# Patient Record
Sex: FEMALE | Race: WHITE | Employment: FULL TIME | URBAN - NONMETROPOLITAN AREA
[De-identification: names, ages, dates, MRNs, and addresses within clinical notes are randomized per-mention and may not be internally consistent; named-entity substitution may affect disease eponyms.]

---

## 2017-12-20 ENCOUNTER — OFFICE VISIT (OUTPATIENT)
Dept: URGENT CARE | Age: 37
End: 2017-12-20
Payer: COMMERCIAL

## 2017-12-20 VITALS
OXYGEN SATURATION: 100 % | DIASTOLIC BLOOD PRESSURE: 83 MMHG | RESPIRATION RATE: 22 BRPM | HEART RATE: 75 BPM | HEIGHT: 68 IN | WEIGHT: 217.38 LBS | SYSTOLIC BLOOD PRESSURE: 126 MMHG | BODY MASS INDEX: 32.94 KG/M2 | TEMPERATURE: 98.2 F

## 2017-12-20 DIAGNOSIS — R52 BODY ACHES: ICD-10-CM

## 2017-12-20 DIAGNOSIS — J10.1 TYPE A INFLUENZA: Primary | ICD-10-CM

## 2017-12-20 DIAGNOSIS — J02.9 SORE THROAT: ICD-10-CM

## 2017-12-20 LAB
INFLUENZA A ANTIBODY: POSITIVE
INFLUENZA B ANTIBODY: NEGATIVE
S PYO AG THROAT QL: NORMAL

## 2017-12-20 PROCEDURE — 87880 STREP A ASSAY W/OPTIC: CPT | Performed by: PHYSICIAN ASSISTANT

## 2017-12-20 PROCEDURE — 87804 INFLUENZA ASSAY W/OPTIC: CPT | Performed by: PHYSICIAN ASSISTANT

## 2017-12-20 PROCEDURE — G8427 DOCREV CUR MEDS BY ELIG CLIN: HCPCS | Performed by: PHYSICIAN ASSISTANT

## 2017-12-20 PROCEDURE — G8417 CALC BMI ABV UP PARAM F/U: HCPCS | Performed by: PHYSICIAN ASSISTANT

## 2017-12-20 PROCEDURE — G8484 FLU IMMUNIZE NO ADMIN: HCPCS | Performed by: PHYSICIAN ASSISTANT

## 2017-12-20 PROCEDURE — 99202 OFFICE O/P NEW SF 15 MIN: CPT | Performed by: PHYSICIAN ASSISTANT

## 2017-12-20 PROCEDURE — 1036F TOBACCO NON-USER: CPT | Performed by: PHYSICIAN ASSISTANT

## 2017-12-20 RX ORDER — OSELTAMIVIR PHOSPHATE 75 MG/1
75 CAPSULE ORAL 2 TIMES DAILY
Qty: 10 CAPSULE | Refills: 0 | Status: SHIPPED | OUTPATIENT
Start: 2017-12-20 | End: 2017-12-25

## 2017-12-20 ASSESSMENT — ENCOUNTER SYMPTOMS
NAUSEA: 0
VOMITING: 0
COUGH: 1
RHINORRHEA: 0
DIARRHEA: 0
SORE THROAT: 1
ABDOMINAL PAIN: 0

## 2017-12-20 NOTE — PATIENT INSTRUCTIONS
Patient Education        Influenza (Flu): Care Instructions  Your Care Instructions    Influenza (flu) is an infection in the lungs and breathing passages. It is caused by the influenza virus. There are different strains, or types, of the flu virus from year to year. Unlike the common cold, the flu comes on suddenly and the symptoms, such as a cough, congestion, fever, chills, fatigue, aches, and pains, are more severe. These symptoms may last up to 10 days. Although the flu can make you feel very sick, it usually doesn't cause serious health problems. Home treatment is usually all you need for flu symptoms. But your doctor may prescribe antiviral medicine to prevent other health problems, such as pneumonia, from developing. Older people and those who have a long-term health condition, such as lung disease, are most at risk for having pneumonia or other health problems. Follow-up care is a key part of your treatment and safety. Be sure to make and go to all appointments, and call your doctor if you are having problems. It's also a good idea to know your test results and keep a list of the medicines you take. How can you care for yourself at home? · Get plenty of rest.  · Drink plenty of fluids, enough so that your urine is light yellow or clear like water. If you have kidney, heart, or liver disease and have to limit fluids, talk with your doctor before you increase the amount of fluids you drink. · Take an over-the-counter pain medicine if needed, such as acetaminophen (Tylenol), ibuprofen (Advil, Motrin), or naproxen (Aleve), to relieve fever, headache, and muscle aches. Read and follow all instructions on the label. No one younger than 20 should take aspirin. It has been linked to Reye syndrome, a serious illness. · Do not smoke. Smoking can make the flu worse. If you need help quitting, talk to your doctor about stop-smoking programs and medicines.  These can increase your chances of quitting for good.  · Breathe moist air from a hot shower or from a sink filled with hot water to help clear a stuffy nose. · Before you use cough and cold medicines, check the label. These medicines may not be safe for young children or for people with certain health problems. · If the skin around your nose and lips becomes sore, put some petroleum jelly on the area. · To ease coughing:  ¨ Drink fluids to soothe a scratchy throat. ¨ Suck on cough drops or plain hard candy. ¨ Take an over-the-counter cough medicine that contains dextromethorphan to help you get some sleep. Read and follow all instructions on the label. ¨ Raise your head at night with an extra pillow. This may help you rest if coughing keeps you awake. · Take any prescribed medicine exactly as directed. Call your doctor if you think you are having a problem with your medicine. To avoid spreading the flu  · Wash your hands regularly, and keep your hands away from your face. · Stay home from school, work, and other public places until you are feeling better and your fever has been gone for at least 24 hours. The fever needs to have gone away on its own without the help of medicine. · Ask people living with you to talk to their doctors about preventing the flu. They may get antiviral medicine to keep from getting the flu from you. · To prevent the flu in the future, get a flu vaccine every fall. Encourage people living with you to get the vaccine. · Cover your mouth when you cough or sneeze. When should you call for help? Call 911 anytime you think you may need emergency care. For example, call if:  ? · You have severe trouble breathing. ?Call your doctor now or seek immediate medical care if:  ? · You have new or worse trouble breathing. ? · You seem to be getting much sicker. ? · You feel very sleepy or confused. ? · You have a new or higher fever. ? · You get a new rash. ? Watch closely for changes in your health, and be sure to contact your doctor if:  ? · You begin to get better and then get worse. ? · You are not getting better after 1 week. Where can you learn more? Go to https://Anaplanpepiceweb.OSIsoft. org and sign in to your Duable Chinese account. Enter X359 in the [facility] box to learn more about \"Influenza (Flu): Care Instructions. \"     If you do not have an account, please click on the \"Sign Up Now\" link. Current as of: May 12, 2017  Content Version: 11.4  © 8527-0076 Healthwise, Incorporated. Care instructions adapted under license by [facility] ([facility]). If you have questions about a medical condition or this instruction, always ask your healthcare professional. [address] any warranty or liability for your use of this information.

## 2017-12-20 NOTE — PROGRESS NOTES
Subjective:      Patient ID: Meryle Slimmer is a 40 y.o. female. HPI    Eunice present today with cough, sore throat, nasal congestion, and chest tightness. Symptoms developed yesterday. No fever noted. No runny nose. Cough is productive. Slight left ear pain. Slight headache. No abdominal pain or NVD. Taken Theraflu and Dayquil. Review of Systems   Constitutional: Negative for chills and fever. HENT: Positive for congestion, ear pain and sore throat. Negative for rhinorrhea. Respiratory: Positive for cough. Gastrointestinal: Negative for abdominal pain, diarrhea, nausea and vomiting. Neurological: Positive for headaches. All other systems reviewed and are negative. Objective:   Physical Exam   Constitutional: She is oriented to person, place, and time. She appears well-developed and well-nourished. No distress. HENT:   Head: Normocephalic and atraumatic. Right Ear: External ear normal.   Left Ear: External ear normal.   Nose: Nose normal.   Mouth/Throat: Oropharynx is clear and moist. No oropharyngeal exudate. Eyes: Conjunctivae are normal. Right eye exhibits no discharge. Left eye exhibits no discharge. Neck: Normal range of motion. Neck supple. Cardiovascular: Normal rate, regular rhythm and normal heart sounds. No murmur heard. Pulmonary/Chest: Effort normal and breath sounds normal. No respiratory distress. She has no wheezes. She has no rales. Abdominal: Soft. Bowel sounds are normal. She exhibits no distension and no mass. There is no tenderness. There is no rebound and no guarding. Lymphadenopathy:     She has no cervical adenopathy. Neurological: She is alert and oriented to person, place, and time. Skin: Skin is warm and dry. No rash noted. She is not diaphoretic. No erythema. No pallor. Psychiatric: She has a normal mood and affect. Her behavior is normal. Judgment and thought content normal.   Nursing note and vitals reviewed.     Results for orders

## 2018-03-27 ENCOUNTER — OFFICE VISIT (OUTPATIENT)
Dept: URGENT CARE | Age: 38
End: 2018-03-27
Payer: COMMERCIAL

## 2018-03-27 VITALS
WEIGHT: 214.4 LBS | TEMPERATURE: 98.9 F | SYSTOLIC BLOOD PRESSURE: 138 MMHG | DIASTOLIC BLOOD PRESSURE: 87 MMHG | OXYGEN SATURATION: 100 % | BODY MASS INDEX: 32.49 KG/M2 | HEIGHT: 68 IN | RESPIRATION RATE: 20 BRPM | HEART RATE: 87 BPM

## 2018-03-27 DIAGNOSIS — R52 BODY ACHES: ICD-10-CM

## 2018-03-27 DIAGNOSIS — K52.9 GASTROENTERITIS: Primary | ICD-10-CM

## 2018-03-27 DIAGNOSIS — R11.2 NAUSEA AND VOMITING, INTRACTABILITY OF VOMITING NOT SPECIFIED, UNSPECIFIED VOMITING TYPE: ICD-10-CM

## 2018-03-27 LAB
INFLUENZA A ANTIBODY: NEGATIVE
INFLUENZA B ANTIBODY: NEGATIVE
S PYO AG THROAT QL: NORMAL

## 2018-03-27 PROCEDURE — 87880 STREP A ASSAY W/OPTIC: CPT | Performed by: NURSE PRACTITIONER

## 2018-03-27 PROCEDURE — G8427 DOCREV CUR MEDS BY ELIG CLIN: HCPCS | Performed by: NURSE PRACTITIONER

## 2018-03-27 PROCEDURE — 1036F TOBACCO NON-USER: CPT | Performed by: NURSE PRACTITIONER

## 2018-03-27 PROCEDURE — 87804 INFLUENZA ASSAY W/OPTIC: CPT | Performed by: NURSE PRACTITIONER

## 2018-03-27 PROCEDURE — 99213 OFFICE O/P EST LOW 20 MIN: CPT | Performed by: NURSE PRACTITIONER

## 2018-03-27 PROCEDURE — G8417 CALC BMI ABV UP PARAM F/U: HCPCS | Performed by: NURSE PRACTITIONER

## 2018-03-27 PROCEDURE — G8484 FLU IMMUNIZE NO ADMIN: HCPCS | Performed by: NURSE PRACTITIONER

## 2018-03-27 RX ORDER — ONDANSETRON 4 MG/1
8 TABLET, FILM COATED ORAL EVERY 8 HOURS PRN
Qty: 9 TABLET | Refills: 0 | Status: SHIPPED | OUTPATIENT
Start: 2018-03-27 | End: 2021-08-26

## 2018-03-27 RX ORDER — ONDANSETRON 8 MG/1
8 TABLET, ORALLY DISINTEGRATING ORAL ONCE
Status: COMPLETED | OUTPATIENT
Start: 2018-03-27 | End: 2018-03-27

## 2018-03-27 RX ADMIN — ONDANSETRON 8 MG: 8 TABLET, ORALLY DISINTEGRATING ORAL at 12:00

## 2018-03-27 ASSESSMENT — ENCOUNTER SYMPTOMS
EYES NEGATIVE: 1
WHEEZING: 0
ABDOMINAL PAIN: 0
VOMITING: 1
COUGH: 0
NAUSEA: 1
EYE REDNESS: 0
SORE THROAT: 0
ABDOMINAL DISTENTION: 0
SHORTNESS OF BREATH: 0
RESPIRATORY NEGATIVE: 1
SINUS PRESSURE: 0
TROUBLE SWALLOWING: 0
CONSTIPATION: 0
DIARRHEA: 1
ALLERGIC/IMMUNOLOGIC NEGATIVE: 1

## 2018-03-27 NOTE — PROGRESS NOTES
ZOFRAN 8 MG ODT GIVEN PO.  PT TOLERATED WELL.--HC, LPN
Influenza A/B   3. Nausea and vomiting, intractability of vomiting not specified, unspecified vomiting type  ondansetron (ZOFRAN-ODT) disintegrating tablet 8 mg    ondansetron (ZOFRAN) 4 MG tablet       Plan:    Discussed viral condition which is what symptoms are eluding to. Discussed self-limiting aspects of virus and importance of hydration. Pt voiced understanding   Orders Placed This Encounter   Procedures    POCT rapid strep A    POCT Influenza A/B       No Follow-up on file. Orders Placed This Encounter   Procedures    POCT rapid strep A    POCT Influenza A/B     Orders Placed This Encounter   Medications    ondansetron (ZOFRAN-ODT) disintegrating tablet 8 mg    ondansetron (ZOFRAN) 4 MG tablet     Sig: Take 2 tablets by mouth every 8 hours as needed for Nausea or Vomiting     Dispense:  9 tablet     Refill:  0       Patient given educational materials - see patient instructions. Discussed use, benefit, and side effects of prescribed medications. All patient questions answered. Pt voiced understanding. Reviewed health maintenance. Instructed to continue current medications, diet and exercise. Patient agreed with treatment plan. Follow up as directed. Patient Instructions   1. Take zofran as prescribed for n/v  2. Stay hydrated with fluids of choice  3. Gradually resume solids as tolerated  4.  Return to clinic if symptoms worsen or fail to improve        Electronically signed by Andria Tsai CNP on 3/27/2018 at 12:17 PM

## 2018-03-27 NOTE — PATIENT INSTRUCTIONS
1. Take zofran as prescribed for n/v  2. Stay hydrated with fluids of choice  3. Gradually resume solids as tolerated  4.  Return to clinic if symptoms worsen or fail to improve

## 2021-03-17 ENCOUNTER — OFFICE VISIT (OUTPATIENT)
Dept: FAMILY MEDICINE CLINIC | Facility: CLINIC | Age: 41
End: 2021-03-17

## 2021-03-17 VITALS
BODY MASS INDEX: 29.62 KG/M2 | TEMPERATURE: 98 F | RESPIRATION RATE: 20 BRPM | HEIGHT: 69 IN | WEIGHT: 200 LBS | DIASTOLIC BLOOD PRESSURE: 77 MMHG | SYSTOLIC BLOOD PRESSURE: 123 MMHG | HEART RATE: 66 BPM

## 2021-03-17 DIAGNOSIS — Z11.1 TUBERCULOSIS SCREENING: Primary | ICD-10-CM

## 2021-03-17 PROCEDURE — 86580 TB INTRADERMAL TEST: CPT | Performed by: NURSE PRACTITIONER

## 2021-03-17 NOTE — PROGRESS NOTES
"Chief Complaint  TB Test    Subjective    History of Present Illness      Patient presents to [facility] for   Pt states that she is her for TB skin test for employment. No further concerns today.       Review of Systems   All other systems reviewed and are negative.      I have reviewed and agree with the HPI and ROS information as above.  Candace Sarmiento Too, APRN     Objective   Vital Signs:   /77   Pulse 66   Temp 98 °F (36.7 °C)   Resp 20   Ht 175.3 cm (69\")   Wt 90.7 kg (200 lb)   BMI 29.53 kg/m²       Physical Exam  Constitutional:       Appearance: She is well-developed and overweight.   HENT:      Head: Normocephalic and atraumatic.      Right Ear: Tympanic membrane, ear canal and external ear normal.      Left Ear: Tympanic membrane, ear canal and external ear normal.      Nose: Nose normal. No septal deviation, nasal tenderness or congestion.      Mouth/Throat:      Lips: Pink. No lesions.      Mouth: Mucous membranes are moist. No oral lesions.      Dentition: Normal dentition.      Pharynx: Oropharynx is clear. No pharyngeal swelling, oropharyngeal exudate or posterior oropharyngeal erythema.   Eyes:      General: Lids are normal. Vision grossly intact. No scleral icterus.        Right eye: No discharge.         Left eye: No discharge.      Extraocular Movements: Extraocular movements intact.      Conjunctiva/sclera: Conjunctivae normal.      Right eye: Right conjunctiva is not injected.      Left eye: Left conjunctiva is not injected.      Pupils: Pupils are equal, round, and reactive to light.   Neck:      Thyroid: No thyroid mass.      Trachea: Trachea normal.   Cardiovascular:      Rate and Rhythm: Normal rate and regular rhythm.      Heart sounds: Normal heart sounds. No murmur heard.   No gallop.    Pulmonary:      Effort: Pulmonary effort is normal.      Breath sounds: Normal breath sounds and air entry. No wheezing, rhonchi or rales.   Abdominal:      " General: There is no distension.      Palpations: Abdomen is soft. There is no mass.      Tenderness: There is no abdominal tenderness. There is no right CVA tenderness, left CVA tenderness, guarding or rebound.   Musculoskeletal:         General: No tenderness or deformity. Normal range of motion.      Cervical back: Full passive range of motion without pain, normal range of motion and neck supple.      Thoracic back: Normal.      Right lower leg: No edema.      Left lower leg: No edema.   Skin:     General: Skin is warm and dry.      Coloration: Skin is not jaundiced.      Findings: No rash.   Neurological:      Mental Status: She is alert and oriented to person, place, and time.      Cranial Nerves: Cranial nerves are intact.      Sensory: Sensation is intact.      Motor: Motor function is intact.      Coordination: Coordination is intact.      Gait: Gait is intact.      Deep Tendon Reflexes: Reflexes are normal and symmetric.   Psychiatric:         Mood and Affect: Mood and affect normal.         Judgment: Judgment normal.          Result Review  Data Reviewed:                   Assessment and Plan    Patient's Body mass index is 29.53 kg/m².     Problem List Items Addressed This Visit     None      Visit Diagnoses     Tuberculosis screening    -  Primary    Relevant Orders    TB Skin Test (Completed)      Patient here today requesting a TB skin test for preemployment qualifications.  No past history not high risk.  Follow-up for recheck in 48 to 72 hours.        Follow Up   Return for 48-72 hrs .  Patient was given instructions and counseling regarding her condition or for health maintenance advice. Please see specific information pulled into the AVS if appropriate.

## 2021-03-19 ENCOUNTER — CLINICAL SUPPORT (OUTPATIENT)
Dept: FAMILY MEDICINE CLINIC | Facility: CLINIC | Age: 41
End: 2021-03-19

## 2021-03-19 LAB
INDURATION: 0 MM (ref 0–10)
TB SKIN TEST: NEGATIVE

## 2021-08-17 ENCOUNTER — TELEPHONE (OUTPATIENT)
Dept: OBGYN CLINIC | Age: 41
End: 2021-08-17

## 2021-08-17 ENCOUNTER — TELEMEDICINE (OUTPATIENT)
Dept: OBGYN CLINIC | Age: 41
End: 2021-08-17
Payer: MEDICAID

## 2021-08-17 DIAGNOSIS — N91.2 AMENORRHEA: ICD-10-CM

## 2021-08-17 DIAGNOSIS — Z32.01 POSITIVE URINE PREGNANCY TEST: ICD-10-CM

## 2021-08-17 DIAGNOSIS — Z76.89 ENCOUNTER TO ESTABLISH CARE: Primary | ICD-10-CM

## 2021-08-17 PROCEDURE — 99203 OFFICE O/P NEW LOW 30 MIN: CPT | Performed by: NURSE PRACTITIONER

## 2021-08-17 RX ORDER — PNV NO.95/FERROUS FUM/FOLIC AC 28MG-0.8MG
TABLET ORAL
COMMUNITY
End: 2021-08-26 | Stop reason: ALTCHOICE

## 2021-08-17 RX ORDER — PROMETHAZINE HYDROCHLORIDE 25 MG/1
25 TABLET ORAL 4 TIMES DAILY PRN
Qty: 20 TABLET | Refills: 2 | Status: SHIPPED | OUTPATIENT
Start: 2021-08-17 | End: 2021-08-17

## 2021-08-17 RX ORDER — PROMETHAZINE HYDROCHLORIDE 25 MG/1
25 TABLET ORAL 4 TIMES DAILY PRN
Qty: 20 TABLET | Refills: 2 | Status: SHIPPED | OUTPATIENT
Start: 2021-08-17 | End: 2021-08-24

## 2021-08-17 ASSESSMENT — ENCOUNTER SYMPTOMS
NAUSEA: 1
EYES NEGATIVE: 1
DIARRHEA: 0
ALLERGIC/IMMUNOLOGIC NEGATIVE: 1
RESPIRATORY NEGATIVE: 1
CONSTIPATION: 0

## 2021-08-17 NOTE — PROGRESS NOTES
2021    TELEHEALTH EVALUATION -- Audio/Visual (During MBTXZ-14 public health emergency)    HPI:    Vandana Berrios (:  1980) has requested an audio/video evaluation for the following concern(s):    New pt presents with +UPT at home. Having some NV, no bleeding. LMP was around 7/4, approx 6-7 weeks. Has not started PNV yet. History of  8 years ago. Review of Systems   Constitutional: Negative. HENT: Negative. Eyes: Negative. Respiratory: Negative. Cardiovascular: Negative. Gastrointestinal: Positive for nausea. Negative for constipation and diarrhea. Endocrine: Negative. Genitourinary: Positive for menstrual problem (missed x1). Negative for frequency and urgency. Musculoskeletal: Negative. Skin: Negative. Allergic/Immunologic: Negative. Neurological: Negative. Hematological: Negative. Psychiatric/Behavioral: Negative. All other systems reviewed and are negative. Prior to Visit Medications    Medication Sig Taking? Authorizing Provider   Levonorgestrel (MIRENA, 52 MG, IU) by Intrauterine route  Historical Provider, MD   ondansetron (ZOFRAN) 4 MG tablet Take 2 tablets by mouth every 8 hours as needed for Nausea or Vomiting  Meg Price, APRN - CNP       Social History     Tobacco Use    Smoking status: Never Smoker    Smokeless tobacco: Never Used   Substance Use Topics    Alcohol use: No    Drug use: Not on file        Allergies   Allergen Reactions    Codeine     Lactose Intolerance (Gi)    , History reviewed. No pertinent past medical history. , History reviewed. No pertinent surgical history. ,   Social History     Tobacco Use    Smoking status: Never Smoker    Smokeless tobacco: Never Used   Substance Use Topics    Alcohol use: No    Drug use: Not on file       PHYSICAL EXAMINATION:  [ INSTRUCTIONS:  \"[x]\" Indicates a positive item  \"[]\" Indicates a negative item  -- DELETE ALL ITEMS NOT EXAMINED]  Vital Signs: (As obtained by patient/caregiver or practitioner observation)    Blood pressure-  Heart rate-    Respiratory rate-    Temperature-  Pulse oximetry-     Constitutional: [x] Appears well-developed and well-nourished [x] No apparent distress      [] Abnormal-   Mental status  [x] Alert and awake  [x] Oriented to person/place/time [x]Able to follow commands      Eyes:  EOM    [x]  Normal  [] Abnormal-  Sclera  [x]  Normal  [] Abnormal -         Discharge [x]  None visible  [] Abnormal -    HENT:   [x] Normocephalic, atraumatic. [] Abnormal   [x] Mouth/Throat: Mucous membranes are moist.     External Ears [x] Normal  [] Abnormal-     Neck: [x] No visualized mass     Pulmonary/Chest: [x] Respiratory effort normal.  [x] No visualized signs of difficulty breathing or respiratory distress        [] Abnormal-      Musculoskeletal:   [x] Normal gait with no signs of ataxia         [x] Normal range of motion of neck        [] Abnormal-       Neurological:        [x] No Facial Asymmetry (Cranial nerve 7 motor function) (limited exam to video visit)          [x] No gaze palsy        [] Abnormal-         Skin:        [x] No significant exanthematous lesions or discoloration noted on facial skin         [] Abnormal-            Psychiatric:       [x] Normal Affect [x] No Hallucinations        [] Abnormal-     Other pertinent observable physical exam findings-     ASSESSMENT/PLAN:  1. Encounter to establish care    2. Amenorrhea    3. Positive urine pregnancy test    Plan hcg and start PNV. Called in Phenergan if needed. Mailing booklet. Return in about 2 weeks (around 8/31/2021) for Prenatal, u/s prior to appt. Sukhdeep Santo, was evaluated through a synchronous (real-time) audio-video encounter. The patient (or guardian if applicable) is aware that this is a billable service. Verbal consent to proceed has been obtained within the past 12 months.  The visit was conducted pursuant to the emergency declaration under the [address] and the National Emergencies Act, [facility] waiver authority and the [place] [employer] Act. Patient identification was verified, and a caregiver was present when appropriate. The patient was located in a state where the provider was credentialed to provide care. Total time spent on this encounter: Not billed by time    --EDELMIRA Andrade CNP on 8/17/2021 at 8:50 AM    An electronic signature was used to authenticate this note.

## 2021-08-17 NOTE — PATIENT INSTRUCTIONS
Patient Education        Learning About Pregnancy  Your Care Instructions     Your health in the early weeks of your pregnancy is particularly important for your baby's health. Take good care of yourself. Anything you do that harms your body can also harm your baby. Make sure to go to all of your doctor appointments. Regular checkups will help keep you and your baby healthy. How can you care for yourself at home? Diet    · Eat a balanced diet. Make sure your diet includes plenty of beans, peas, and leafy green vegetables.     · Do not skip meals or go for many hours without eating. If you are nauseated, try to eat a small, healthy snack every 2 to 3 hours.     · Do not eat fish that has a high level of mercury, such as shark, swordfish, or mackerel. Do not eat more than one can of tuna each week.     · Drink plenty of fluids. If you have kidney, heart, or liver disease and have to limit fluids, talk with your doctor before you increase the amount of fluids you drink.     · Cut down on caffeine, such as coffee, tea, and cola.     · Do not drink alcohol, such as beer, wine, or hard liquor.     · Take a multivitamin that contains at least 400 micrograms (mcg) of folic acid to help prevent birth defects. Fortified cereal and whole wheat bread are good additional sources of folic acid.     · Increase the calcium in your diet. Try to drink a quart of skim milk each day. You may also take calcium supplements and choose foods such as cheese and yogurt. Lifestyle    · Make sure you go to your follow-up appointments.     · Get plenty of rest. You may be unusually tired while you are pregnant.     · Get at least 30 minutes of exercise on most days of the week. Walking is a good choice. If you have not exercised in the past, start out slowly. Take several short walks each day.     · Do not smoke. If you need help quitting, talk to your doctor about stop-smoking programs.  These can increase your chances of quitting for good.     · Do not touch cat feces or litter boxes. Also, wash your hands after you handle raw meat, and fully cook all meat before you eat it. Wear gloves when you work in the yard or garden, and wash your hands well when you are done. Cat feces, raw or undercooked meat, and contaminated dirt can cause an infection that may harm your baby or lead to a miscarriage.     · Do not use saunas or hot tubs. Raising your body temperature may harm your baby.     · Avoid chemical fumes, paint fumes, or poisons.     · Do not use illegal drugs, marijuana, or alcohol. Medicines    · Review all of your medicines with your doctor. Some of your routine medicines may need to be changed to protect your baby.     · Use acetaminophen (Tylenol) to relieve minor problems, such as a mild headache or backache or a mild fever with cold symptoms. Do not use nonsteroidal anti-inflammatory drugs (NSAIDs), such as ibuprofen (Advil, Motrin) or naproxen (Aleve), unless your doctor says it is okay.     · Do not take two or more pain medicines at the same time unless the doctor told you to. Many pain medicines have acetaminophen, which is Tylenol. Too much acetaminophen (Tylenol) can be harmful.     · Take your medicines exactly as prescribed. Call your doctor if you think you are having a problem with your medicine. To manage morning sickness    · If you feel sick when you first wake up, try eating a small snack (such as crackers) before you get out of bed. Allow some time to digest the snack, and then get out of bed slowly.     · Do not skip meals or go for long periods without eating. An empty stomach can make nausea worse.     · Eat small, frequent meals instead of three large meals each day.     · Drink plenty of fluids.     · Eat foods that are high in protein but low in fat.     · If you are taking iron supplements, ask your doctor if they are necessary.  Iron can make nausea worse.     · Avoid any smells, such as coffee, that make you feel sick.     · Get lots of rest. Morning sickness may be worse when you are tired. Follow-up care is a key part of your treatment and safety. Be sure to make and go to all appointments, and call your doctor if you are having problems. It's also a good idea to know your test results and keep a list of the medicines you take. Where can you learn more? Go to https://YuMepeMajor Aide.AmeriPath. org and sign in to your EveryMove account. Enter F994 in the [place] box to learn more about \"Learning About Pregnancy. \"     If you do not have an account, please click on the \"Sign Up Now\" link. Current as of: October 8, 2020               Content Version: 12.9  © 2006-2021 Healthwise, Incorporated. Care instructions adapted under license by [facility] ([facility]). If you have questions about a medical condition or this instruction, always ask your healthcare professional. [place] [address] any warranty or liability for your use of this information.

## 2021-08-17 NOTE — TELEPHONE ENCOUNTER
Returned call to pt re HCG results. She wanted to know how far along she was, I told her that Radha Mckeon has to review her results and she will be contacted about an ultrasound that will give her the CHRIS. Pt v/u.

## 2021-08-23 DIAGNOSIS — Z36.89 CONFIRM FETAL CARDIAC ACTIVITY USING ULTRASOUND: Primary | ICD-10-CM

## 2021-08-24 ENCOUNTER — NURSE TRIAGE (OUTPATIENT)
Dept: OTHER | Facility: CLINIC | Age: 41
End: 2021-08-24

## 2021-08-24 ENCOUNTER — TELEPHONE (OUTPATIENT)
Dept: OBGYN CLINIC | Age: 41
End: 2021-08-24

## 2021-08-24 NOTE — TELEPHONE ENCOUNTER
Received VM from Deanna Simon at [facility] with [employer] Complaint. Brief description of triage: Pt calls to report symptoms of widespread rash. Attempted to return call, no answer. Left voicemail. No contact, call not triaged. Attention Provider: Thank you for allowing me to participate in the care of your patient. The patient was connected to triage in response to information provided to the E[facility]. Please do not respond through this encounter as the response is not directed to a shared pool.         Reason for Disposition   Message left on identified voicemail    Protocols used: NO CONTACT OR DUPLICATE CONTACT CALL-ADULT-[place]

## 2021-08-24 NOTE — TELEPHONE ENCOUNTER
Pt called office and states she has a rash all over her body, and that it has been bothering her for a few days, but can't remember when it started. She received a covid vaccine a few weeks ago. She is unsure if it is from the vaccine or the prenatal vitamins she started. I told her to stop her prenatal vitamins and take some Benedryl  Pt is worried because she has her viability ultrasound tomorrow and wants to cancel it. I told her to wait until tomorrow and see if the rash is improved before she cancels it. I told her she could keep her appt on Thursday so we can take a look at the rash. Pt v/u.

## 2021-08-26 ENCOUNTER — OFFICE VISIT (OUTPATIENT)
Dept: OBGYN CLINIC | Age: 41
End: 2021-08-26
Payer: MEDICAID

## 2021-08-26 VITALS
WEIGHT: 182 LBS | DIASTOLIC BLOOD PRESSURE: 74 MMHG | HEIGHT: 69 IN | HEART RATE: 52 BPM | TEMPERATURE: 98.3 F | BODY MASS INDEX: 26.96 KG/M2 | SYSTOLIC BLOOD PRESSURE: 114 MMHG

## 2021-08-26 DIAGNOSIS — R21 RASH: Primary | ICD-10-CM

## 2021-08-26 DIAGNOSIS — Z32.01 PREGNANCY CONFIRMED BY POSITIVE BLOOD TEST: ICD-10-CM

## 2021-08-26 PROCEDURE — 99213 OFFICE O/P EST LOW 20 MIN: CPT | Performed by: NURSE PRACTITIONER

## 2021-08-26 RX ORDER — CETIRIZINE HYDROCHLORIDE 10 MG/1
10 TABLET ORAL DAILY
Qty: 30 TABLET | Refills: 11 | Status: SHIPPED | OUTPATIENT
Start: 2021-08-26 | End: 2021-09-25

## 2021-08-26 ASSESSMENT — ENCOUNTER SYMPTOMS
GASTROINTESTINAL NEGATIVE: 1
ALLERGIC/IMMUNOLOGIC NEGATIVE: 1
DIARRHEA: 0
RESPIRATORY NEGATIVE: 1
EYES NEGATIVE: 1
CONSTIPATION: 0

## 2021-08-26 NOTE — PATIENT INSTRUCTIONS
Patient Education        Rash: Care Instructions  Your Care Instructions  A rash is any irritation or inflammation of the skin. Rashes have many possible causes, including allergy, infection, illness, heat, and emotional stress. Follow-up care is a key part of your treatment and safety. Be sure to make and go to all appointments, and call your doctor if you are having problems. It's also a good idea to know your test results and keep a list of the medicines you take. How can you care for yourself at home? · Wash the area with water only. Soap can make dryness and itching worse. Pat dry. · Put cold, wet cloths on the rash to reduce itching. · Keep cool, and stay out of the sun. · Leave the rash open to the air as much of the time as possible. · Sometimes petroleum jelly (Vaseline) can help relieve the discomfort caused by a rash. A moisturizing lotion, such as Cetaphil, also may help. Calamine lotion may help for rashes caused by contact with something (such as a plant or soap) that irritated the skin. Use it 3 or 4 times a day. · If your doctor prescribed a cream, use it as directed. If your doctor prescribed medicine, take it exactly as directed. · If your rash itches so badly that it interferes with your normal activities, take an over-the-counter antihistamine, such as diphenhydramine (Benadryl) or loratadine (Claritin). Read and follow all instructions on the label. When should you call for help? Call your doctor now or seek immediate medical care if:    · You have signs of infection, such as:  ? Increased pain, swelling, warmth, or redness. ? Red streaks leading from the area. ? Pus draining from the area. ? A fever.     · You have joint pain along with the rash. Watch closely for changes in your health, and be sure to contact your doctor if:    · Your rash is changing or getting worse.  For example, call if you have pain along with the rash, the rash is spreading, or you have new blisters.     · You do not get better after 1 week. Where can you learn more? Go to [email or web address] and sign in to your [employer] account. Enter V104 in the EventRegist box to learn more about \"Rash: Care Instructions. \"     If you do not have an account, please click on the \"Sign Up Now\" link. Current as of: March 3, 2021               Content Version: 12.9  © 2006-2021 Healthwise, [employer]. Care instructions adapted under license by [facility] [address] If you have questions about a medical condition or this instruction, always ask your healthcare professional. [address] any warranty or liability for your use of this information.

## 2021-08-26 NOTE — PROGRESS NOTES
Sukdheep Santo is a 36 y.o. female who presents today for her medical conditions/ complaints as noted below. Sukhdeep Santo is c/o of Rash (Patient states she has a rash on her sides and buttocks. It appeared about 1wk ago. Patient states she was informed to take benedryl but she vomited it back up.)        HPI  Pt presents c/o rash on her sides and down her bottom. Started about a week ago. Was itching but that has improved. Questions if it was her PNV she started or the covid vaccine. Took both around the same time. Has had +hcg but cancelled her viability u/s. Patient's last menstrual period was 2021 (within weeks).     Past Medical History:   Diagnosis Date    Anemia      History reviewed. No pertinent surgical history. History reviewed. No pertinent family history. Social History     Tobacco Use    Smoking status: Never Smoker    Smokeless tobacco: Never Used   Substance Use Topics    Alcohol use: No       Current Outpatient Medications   Medication Sig Dispense Refill    cetirizine (ZYRTEC) 10 MG tablet Take 1 tablet by mouth daily 30 tablet 11     No current facility-administered medications for this visit. Allergies   Allergen Reactions    Codeine     Lactose Intolerance (Gi)      Vitals:    21 0932   BP: 114/74   Pulse: 52   Temp: 98.3 °F (36.8 °C)     Body mass index is 26.88 kg/m². Review of Systems   Constitutional: Negative. HENT: Negative. Eyes: Negative. Respiratory: Negative. Cardiovascular: Negative. Gastrointestinal: Negative. Negative for constipation and diarrhea. Endocrine: Negative. Genitourinary: Positive for menstrual problem (missed x1). Negative for frequency and urgency. Musculoskeletal: Negative. Skin: Positive for rash. Allergic/Immunologic: Negative. Neurological: Negative. Hematological: Negative. Psychiatric/Behavioral: Negative. All other systems reviewed and are negative.         Physical Exam  Vitals and nursing note reviewed. Constitutional:       Appearance: She is well-developed. HENT:      Head: Normocephalic. Right Ear: External ear normal.      Left Ear: External ear normal.      Nose: Nose normal.   Musculoskeletal:         General: Normal range of motion. Cervical back: Normal range of motion. Skin:     General: Skin is warm and dry. Comments: Hives noted around her belly and up her sides   Neurological:      Mental Status: She is alert and oriented to person, place, and time. Psychiatric:         Attention and Perception: Attention normal.         Mood and Affect: Mood normal.         Speech: Speech normal.         Behavior: Behavior normal.         Thought Content: Thought content normal.         Cognition and Memory: Cognition normal.         Judgment: Judgment normal.          Diagnosis Orders   1. Rash     2. Pregnancy confirmed by positive blood test         MEDICATIONS:  Orders Placed This Encounter   Medications    cetirizine (ZYRTEC) 10 MG tablet     Sig: Take 1 tablet by mouth daily     Dispense:  30 tablet     Refill:  11       ORDERS:  No orders of the defined types were placed in this encounter. PLAN:  Gave samples of different PNV  Called in Zyrtec, discussed OTC creams safe in pregnancy    There are no Patient Instructions on file for this visit.

## 2021-09-09 ENCOUNTER — HOSPITAL ENCOUNTER (OUTPATIENT)
Dept: ULTRASOUND IMAGING | Age: 41
Discharge: HOME | End: 2021-09-09
Payer: MEDICARE

## 2021-09-09 ENCOUNTER — INITIAL PRENATAL (OUTPATIENT)
Dept: OBGYN CLINIC | Age: 41
End: 2021-09-09
Payer: MEDICARE

## 2021-09-09 VITALS
DIASTOLIC BLOOD PRESSURE: 84 MMHG | SYSTOLIC BLOOD PRESSURE: 128 MMHG | HEART RATE: 61 BPM | WEIGHT: 177 LBS | BODY MASS INDEX: 26.14 KG/M2

## 2021-09-09 DIAGNOSIS — D25.9 UTERINE FIBROIDS AFFECTING PREGNANCY IN FIRST TRIMESTER: ICD-10-CM

## 2021-09-09 DIAGNOSIS — O34.11 UTERINE FIBROIDS AFFECTING PREGNANCY IN FIRST TRIMESTER: ICD-10-CM

## 2021-09-09 DIAGNOSIS — Z36.89 CONFIRM FETAL CARDIAC ACTIVITY USING ULTRASOUND: ICD-10-CM

## 2021-09-09 DIAGNOSIS — O09.521 AMA (ADVANCED MATERNAL AGE) MULTIGRAVIDA 35+, FIRST TRIMESTER: ICD-10-CM

## 2021-09-09 DIAGNOSIS — Z34.81 ENCOUNTER FOR SUPERVISION OF OTHER NORMAL PREGNANCY IN FIRST TRIMESTER: ICD-10-CM

## 2021-09-09 DIAGNOSIS — O09.91 HIGH-RISK PREGNANCY IN FIRST TRIMESTER: Primary | ICD-10-CM

## 2021-09-09 DIAGNOSIS — Z3A.10 10 WEEKS GESTATION OF PREGNANCY: ICD-10-CM

## 2021-09-09 PROCEDURE — G8427 DOCREV CUR MEDS BY ELIG CLIN: HCPCS | Performed by: NURSE PRACTITIONER

## 2021-09-09 PROCEDURE — 99213 OFFICE O/P EST LOW 20 MIN: CPT | Performed by: NURSE PRACTITIONER

## 2021-09-09 PROCEDURE — 1036F TOBACCO NON-USER: CPT | Performed by: NURSE PRACTITIONER

## 2021-09-09 PROCEDURE — 76801 OB US < 14 WKS SINGLE FETUS: CPT

## 2021-09-09 PROCEDURE — G8419 CALC BMI OUT NRM PARAM NOF/U: HCPCS | Performed by: NURSE PRACTITIONER

## 2021-09-09 NOTE — PROGRESS NOTES
Pt presents today for routine prenatal visit. Pt denies vaginal bleeding or leaking of fluid. She has had a little cramping. She does want to do genetic testing. She wants to do her pap at the next visit.

## 2021-09-09 NOTE — PROGRESS NOTES
Cleavon Ormond is here for a new obstetrical visit. Today she is 10w0d weeks EGA. She is doing well and has no complaints. Got her second covid vaccine and developed a rash. Has been taking Benadryl. Had viability u/s today. Has several fibroids and states she has had these and have caused heavy periods. Taking PNV. She  does not have vaginal bleeding, leaking of fluid, contractions. She does not have blurred vision, SOB, or increased swelling in legs or face. Pt does not feel fetal movement regularly. Plan of care was discussed with patient. Patient was encouraged to adhere to a well-balanced diet, including increasing water intake and limiting excessive caffeine and salt. The benefits of exercise were discussed; however she was advised against heavy lifting, sit-ups and abdominal crunches. A list of safe OTC medications was provided and discussed. The patient was cautioned against the use of tanning beds, hot tubs, saunas, and x-rays. Avoidance of tobacco, alcohol and illicit drugs was also discussed due to harmful effects on the fetus and increased risks associated with pregnancy. Certain labs and ultrasounds are required at certain times during pregnancy but others are optional, including the serum integrated screen/[place]/AFP/ [facility], and other genetic testing. The patient was encouraged to attend childbirth classes and general hospital information was provided based on patients hospital of choice. Objective: Mother's Prenatal Vitals  BP: 128/84  Weight: 177 lb (80.3 kg)  Pulse: 61  Patient Position: Sitting  Prenatal Fetal Information  Fetal Heart Rate: 161   Pt is A&Ox3, in no acute distress. Normocephalic, atraumatic. PERRL. Resp even and non-labored. Skin pink, warm & dry. Gravid abdomen. GALVAN's well. Gait steady. Assessment:  IUP at 10w0d wks      Diagnosis Orders   1. High-risk pregnancy in first trimester  External Referral To Maternal Fetal Medicine   2.  Encounter for supervision of other normal pregnancy in first trimester     3. 10 weeks gestation of pregnancy  HIV Obstetric Panel    Culture, Urine    Varicella Zoster Antibody, IgG    Chlamydia/N. Gonorrhoeae/T. Vaginalis   4. Uterine fibroids affecting pregnancy in first trimester  External Referral To Maternal Fetal Medicine   5. AMA (advanced maternal age) multigravida 33+, first trimester  External Referral To Maternal Fetal Medicine     Plan:Pt counseled on Genetic testing  Continue with routine prenatal care  Plan referral to M for fibroids during pregnancy and AMA  RTC in 4 wk for prenatal visit    MEDICATIONS:  No orders of the defined types were placed in this encounter. ORDERS:  Orders Placed This Encounter   Procedures    Culture, Urine    Chlamydia/N. Gonorrhoeae/T. Vaginalis    HIV Obstetric Panel    Varicella Zoster Antibody, IgG    External Referral To Maternal Fetal Medicine

## 2021-09-10 DIAGNOSIS — R79.89 ABNORMAL COMPLETE BLOOD COUNT: Primary | ICD-10-CM

## 2021-09-10 RX ORDER — NITROFURANTOIN 25; 75 MG/1; MG/1
100 CAPSULE ORAL 2 TIMES DAILY
Qty: 14 CAPSULE | Refills: 0 | Status: SHIPPED | OUTPATIENT
Start: 2021-09-10 | End: 2021-09-17

## 2021-09-10 RX ORDER — METRONIDAZOLE 500 MG/1
TABLET ORAL
Qty: 4 TABLET | Refills: 0 | Status: SHIPPED | OUTPATIENT
Start: 2021-09-10 | End: 2021-09-28

## 2021-09-24 DIAGNOSIS — R79.89 ABNORMAL COMPLETE BLOOD COUNT: Primary | ICD-10-CM

## 2021-09-27 NOTE — PROGRESS NOTES
OP HEMATOLOGY/ONCOLOGY CONSULTATION      Pt Name: Pedrito Ortiz: 1980  MRN: [id number]  Referring provider: EDELMIRA Ma  Requesting provider: EDELMIRA Emerson  Reason for consultation: Abnormal CBC  Date of evaluation: 2021    History Obtained From:  patient, mother - Lyndsay Rivera, electronic medical record    CHIEF COMPLAINT:    Chief Complaint   Patient presents with    New Patient     abnormal cbc     HISTORY OF PRESENT ILLNESS:    Elia Schaefer is a 39 y.o.  female referred to the clinic by EDELMIRA Emerson for evaluation of abnormal CBC. Hematology consultation performed 2021. She is  2, para 1. Ann Marie Jasso states she is currently 13 weeks pregnant in her second pregnancy. She has a history of iron deficiency with her first pregnancy in . She required parenteral iron at that time, treated by Dr. Patricio Medrano. Ann Marie Jasso takes no medications aside from 1 prenatal vitamin daily. She has decreased appetite and mild pregnancy related nausea. She denies vaginal bleeding, melena or hematochezia. CBC 2021: WBC 6.3, Hgb 8.7/MCV 75.4, platelets 431,696    CBC today, 2021, includes a WBC of 7.16, Hgb 9.1/MCV 80, platelets 223,267. RDW 23.1. CBC findings are most consistent with iron deficiency. Serology including iron panel, B12, folate and CMP are requested. Rx is provided for ferrous sulfate 325 mg po BID. Ann Marie Jasso will began taking 1 tablet daily x1 week to ensure tolerability, then increase to twice daily. If Eunice cannot tolerate oral iron or does not have sufficient improvement in Hgb, parenteral iron with Injectafer will be arranged. Findings were discussed with Ann Marie Jasso and her mother, Lyndsay Rivera. All questions were answered. Past Medical History:   Diagnosis Date    Anemia      No past surgical history on file.     Current Outpatient Medications:     Prenatal Vit-Fe Fumarate-FA (PRENATAL VITAMIN PO), Take 1 tablet by mouth daily, Disp: , Rfl:     ferrous sulfate (IRON 325) 325 (65 Fe) MG tablet, Take 1 tablet by mouth 2 times daily, Disp: 60 tablet, Rfl: 5   Allergies: Allergies   Allergen Reactions    Codeine     Lactose Intolerance (Gi)      Social History     Tobacco Use    Smoking status: Never Smoker    Smokeless tobacco: Never Used   Substance Use Topics    Alcohol use: No    Drug use: Never     Family History   Problem Relation Age of Onset    Colon Cancer Maternal Aunt     Cancer Paternal Uncle         melanoma     Cancer Maternal Grandmother         uterine/ovarian     Health Maintenance   Topic Date Due    Hepatitis C screen  Never done    Varicella vaccine (1 of 2 - 2-dose childhood series) Never done    COVID-19 Vaccine (1) Never done    HIV screen  Never done    DTaP/Tdap/Td vaccine (1 - Tdap) Never done    Cervical cancer screen  Never done    Lipid screen  Never done    Diabetes screen  Never done    Flu vaccine (1) Never done   [employer] Visit (AWV)  Never done    Hepatitis A vaccine  Aged Out    Hepatitis B vaccine  Aged Out    Hib vaccine  Aged Out    Meningococcal (ACWY) vaccine  Aged Out    Pneumococcal 0-64 years Vaccine  Aged Out     Subjective   Review of Systems   Constitutional: Negative for fatigue and fever. 13 weeks pregnant   HENT: Negative for dental problem, hearing loss, mouth sores, nosebleeds, sore throat and trouble swallowing. Eyes: Negative for discharge and itching. Respiratory: Negative for cough, shortness of breath and wheezing. Cardiovascular: Negative for chest pain, palpitations and leg swelling. Gastrointestinal: Positive for nausea. Negative for abdominal pain, constipation, diarrhea and vomiting. Decreased appetite   Endocrine: Negative for cold intolerance and heat intolerance. Genitourinary: Negative for dysuria, frequency, hematuria and urgency. Musculoskeletal: Negative for arthralgias, joint swelling and myalgias.    Skin: Findings: No rash. Neurological:      Mental Status: She is alert and oriented to person, place, and time. Comments: follows commands, non-focal   Psychiatric:         Behavior: Behavior normal. Behavior is cooperative. Thought Content: Thought content normal.         Judgment: Judgment normal.      Comments: Alert and oriented to person, place and time. /63   Pulse 70   Ht 5' 9\" (1.753 m)   Wt 181 lb 11.2 oz (82.4 kg)   LMP 07/01/2021 (Within Weeks)   SpO2 100%   BMI 26.83 kg/m²   Wt Readings from Last 3 Encounters:   09/28/21 181 lb 11.2 oz (82.4 kg)   09/09/21 177 lb (80.3 kg)   08/26/21 182 lb (82.6 kg)     Labs reviewed by me:  CBC 09/28/21:   Lab Results   Component Value Date    WBC 7.16 09/28/2021    HGB 9.1 (L) 09/28/2021    HCT 31.2 (L) 09/28/2021    MCV 80.0 09/28/2021     09/28/2021    LYMPHOPCT 26.8 09/28/2021    RBC 3.90 (L) 09/28/2021    MCH 23.3 (L) 09/28/2021    MCHC 29.2 (L) 09/28/2021    RDW 23.1 (H) 09/28/2021     ASSESSMENT/PLAN:    1. Microcytic hypochromic anemia    2. 13 weeks gestation of pregnancy      CBC findings consistent with iron deficiency. Serology requested as noted below. Begin ferrous sulfate 325 mg po daily, increase to twice daily if tolerating after 1 week. Return to clinic 4 weeks to monitor CBC, if insufficient improvement in Hgb or intolerability to oral iron, will arrange parenteral iron with Injectafer. Medical records from Dr. Noah Patton on requested for review. Plan of care was discussed with patient and her mother, Claudia Painting. All questions answered.   They are agreeable as outlined    Orders Placed This Encounter   Procedures    Iron and TIBC    Ferritin    Vitamin B12    Folate    Comprehensive Metabolic Panel     Orders Placed This Encounter   Procedures    Iron and TIBC    Ferritin    Vitamin B12    Folate    Comprehensive Metabolic Panel     Return in about 4 weeks (around 10/26/2021) for follow up with Colton Nascimento [address] Tiffanie Lemus, EDELMIRA. I have seen, examined and reviewed this patient medication list, appropriate labs and imaging studies. I reviewed relevant medical records and others physicians notes. I discussed the plan of care with the patient. I answered all questions to the patients satisfaction. I have also reviewed the chief complaint (CC) and part of the history (History of Present Illness (HPI), Past Family Social History [facility]), or Review of Systems (ROS) and made changes when appropriated. Dictated utilizing Dragon transcription software.           EDELMIRA Kelley  1:53 PM  9/28/2021

## 2021-09-28 ENCOUNTER — OFFICE VISIT (OUTPATIENT)
Dept: HEMATOLOGY | Age: 41
End: 2021-09-28
Payer: MEDICARE

## 2021-09-28 ENCOUNTER — HOSPITAL ENCOUNTER (OUTPATIENT)
Dept: INFUSION THERAPY | Age: 41
Discharge: HOME | End: 2021-09-28
Payer: MEDICAID

## 2021-09-28 ENCOUNTER — ROUTINE PRENATAL (OUTPATIENT)
Dept: OBGYN CLINIC | Age: 41
End: 2021-09-28
Payer: MEDICAID

## 2021-09-28 VITALS
WEIGHT: 179 LBS | SYSTOLIC BLOOD PRESSURE: 112 MMHG | HEART RATE: 59 BPM | BODY MASS INDEX: 26.43 KG/M2 | DIASTOLIC BLOOD PRESSURE: 74 MMHG

## 2021-09-28 VITALS
OXYGEN SATURATION: 100 % | HEART RATE: 70 BPM | SYSTOLIC BLOOD PRESSURE: 115 MMHG | HEIGHT: 69 IN | WEIGHT: 181.7 LBS | BODY MASS INDEX: 26.91 KG/M2 | DIASTOLIC BLOOD PRESSURE: 63 MMHG

## 2021-09-28 DIAGNOSIS — Z34.81 ENCOUNTER FOR SUPERVISION OF OTHER NORMAL PREGNANCY IN FIRST TRIMESTER: Primary | ICD-10-CM

## 2021-09-28 DIAGNOSIS — R79.89 ABNORMAL COMPLETE BLOOD COUNT: ICD-10-CM

## 2021-09-28 DIAGNOSIS — D25.9 UTERINE LEIOMYOMA, UNSPECIFIED LOCATION: ICD-10-CM

## 2021-09-28 DIAGNOSIS — D50.9 MICROCYTIC HYPOCHROMIC ANEMIA: Primary | ICD-10-CM

## 2021-09-28 DIAGNOSIS — D50.9 MICROCYTIC HYPOCHROMIC ANEMIA: ICD-10-CM

## 2021-09-28 DIAGNOSIS — A59.01 VAGINAL TRICHOMONIASIS: ICD-10-CM

## 2021-09-28 DIAGNOSIS — O09.521 AMA (ADVANCED MATERNAL AGE) MULTIGRAVIDA 35+, FIRST TRIMESTER: ICD-10-CM

## 2021-09-28 DIAGNOSIS — Z3A.13 13 WEEKS GESTATION OF PREGNANCY: ICD-10-CM

## 2021-09-28 DIAGNOSIS — O09.91 HIGH-RISK PREGNANCY IN FIRST TRIMESTER: ICD-10-CM

## 2021-09-28 LAB
ALBUMIN SERPL-MCNC: 3.6 G/DL (ref 3.5–5.2)
ALP BLD-CCNC: 48 U/L (ref 35–104)
ALT SERPL-CCNC: 9 U/L (ref 9–52)
ANION GAP SERPL CALCULATED.3IONS-SCNC: 7 MMOL/L (ref 7–19)
AST SERPL-CCNC: 15 U/L (ref 14–36)
BASOPHILS ABSOLUTE: 0.02 K/UL (ref 0.01–0.08)
BASOPHILS RELATIVE PERCENT: 0.3 % (ref 0.1–1.2)
BILIRUB SERPL-MCNC: <0.2 MG/DL (ref 0.2–1.3)
BUN BLDV-MCNC: 7 MG/DL (ref 7–17)
CALCIUM SERPL-MCNC: 9.3 MG/DL (ref 8.4–10.2)
CHLORIDE BLD-SCNC: 105 MMOL/L (ref 98–111)
CO2: 25 MMOL/L (ref 22–29)
CREAT SERPL-MCNC: <0.5 MG/DL (ref 0.5–1)
EOSINOPHILS ABSOLUTE: 0.12 K/UL (ref 0.04–0.54)
EOSINOPHILS RELATIVE PERCENT: 1.7 % (ref 0.7–7)
FERRITIN: 5.5 NG/ML (ref 6.2–137)
FOLATE: 24 NG/ML (ref 2.7–20)
GFR NON-AFRICAN AMERICAN: >60
GLOBULIN: 2.8 G/DL
GLUCOSE BLD-MCNC: 106 MG/DL (ref 74–106)
HCT VFR BLD CALC: 31.2 % (ref 34.1–44.9)
HEMOGLOBIN: 9.1 G/DL (ref 11.2–15.7)
IRON SATURATION: 28 % (ref 14–50)
IRON: 135 UG/DL (ref 37–170)
LYMPHOCYTES ABSOLUTE: 1.92 K/UL (ref 1.18–3.74)
LYMPHOCYTES RELATIVE PERCENT: 26.8 % (ref 19.3–53.1)
MCH RBC QN AUTO: 23.3 PG (ref 25.6–32.2)
MCHC RBC AUTO-ENTMCNC: 29.2 G/DL (ref 32.3–35.5)
MCV RBC AUTO: 80 FL (ref 79.4–94.8)
MONOCYTES ABSOLUTE: 0.62 K/UL (ref 0.24–0.82)
MONOCYTES RELATIVE PERCENT: 8.7 % (ref 4.7–12.5)
NEUTROPHILS ABSOLUTE: 4.48 K/UL (ref 1.56–6.13)
NEUTROPHILS RELATIVE PERCENT: 62.5 % (ref 34–71.1)
PDW BLD-RTO: 23.1 % (ref 11.7–14.4)
PLATELET # BLD: 267 K/UL (ref 182–369)
PMV BLD AUTO: 9.7 FL (ref 7.4–10.4)
POTASSIUM SERPL-SCNC: 3.6 MMOL/L (ref 3.5–5.1)
RBC # BLD: 3.9 M/UL (ref 3.93–5.22)
SODIUM BLD-SCNC: 137 MMOL/L (ref 137–145)
TOTAL IRON BINDING CAPACITY: 487 UG/DL (ref 265–497)
TOTAL PROTEIN: 6.4 G/DL (ref 6.3–8.2)
VITAMIN B-12: 348 PG/ML (ref 239–931)
WBC # BLD: 7.16 K/UL (ref 3.98–10.04)

## 2021-09-28 PROCEDURE — 83540 ASSAY OF IRON: CPT

## 2021-09-28 PROCEDURE — 99203 OFFICE O/P NEW LOW 30 MIN: CPT | Performed by: NURSE PRACTITIONER

## 2021-09-28 PROCEDURE — 36415 COLL VENOUS BLD VENIPUNCTURE: CPT

## 2021-09-28 PROCEDURE — 99212 OFFICE O/P EST SF 10 MIN: CPT

## 2021-09-28 PROCEDURE — 83550 IRON BINDING TEST: CPT

## 2021-09-28 PROCEDURE — 99213 OFFICE O/P EST LOW 20 MIN: CPT | Performed by: NURSE PRACTITIONER

## 2021-09-28 PROCEDURE — G8419 CALC BMI OUT NRM PARAM NOF/U: HCPCS | Performed by: NURSE PRACTITIONER

## 2021-09-28 PROCEDURE — 85025 COMPLETE CBC W/AUTO DIFF WBC: CPT

## 2021-09-28 PROCEDURE — 82728 ASSAY OF FERRITIN: CPT

## 2021-09-28 PROCEDURE — 82607 VITAMIN B-12: CPT

## 2021-09-28 PROCEDURE — 82746 ASSAY OF FOLIC ACID SERUM: CPT

## 2021-09-28 PROCEDURE — 1036F TOBACCO NON-USER: CPT | Performed by: NURSE PRACTITIONER

## 2021-09-28 PROCEDURE — G8427 DOCREV CUR MEDS BY ELIG CLIN: HCPCS | Performed by: NURSE PRACTITIONER

## 2021-09-28 PROCEDURE — 80053 COMPREHEN METABOLIC PANEL: CPT

## 2021-09-28 RX ORDER — FERROUS SULFATE 325(65) MG
325 TABLET ORAL 2 TIMES DAILY
Qty: 60 TABLET | Refills: 5 | Status: SHIPPED | OUTPATIENT
Start: 2021-09-28

## 2021-09-28 RX ORDER — DOCUSATE SODIUM 100 MG/1
100 CAPSULE, LIQUID FILLED ORAL DAILY PRN
Qty: 30 CAPSULE | Refills: 3 | Status: SHIPPED | OUTPATIENT
Start: 2021-09-28 | End: 2021-12-06

## 2021-09-28 ASSESSMENT — ENCOUNTER SYMPTOMS
DIARRHEA: 0
NAUSEA: 1
CONSTIPATION: 0
EYE ITCHING: 0
VOMITING: 0
SORE THROAT: 0
TROUBLE SWALLOWING: 0
WHEEZING: 0
ABDOMINAL PAIN: 0
EYE DISCHARGE: 0
COUGH: 0
SHORTNESS OF BREATH: 0

## 2021-09-28 NOTE — PATIENT INSTRUCTIONS
line at [phone]. Get emergency medical help if a child has accidentally swallowed a tablet. An overdose of iron can be fatal to a young child. Overdose symptoms may include severe vomiting, coughing up blood, bloody diarrhea, urinating less, thirst, dry skin, muscle cramps, dizziness, or fainting. What should I avoid while taking ferrous sulfate? Avoid taking other iron supplements. Do not take any vitamin or mineral supplements without asking a doctor or pharmacist.  What are the possible side effects of ferrous sulfate? Get emergency medical help if you have signs of an allergic reaction:  hives; difficulty breathing; swelling of your face, lips, tongue, or throat. Call your doctor at once if you have:  · severe stomach pain or vomiting;  · cough with bloody mucus or vomit that looks like coffee grounds;  · fever; or  · bloody or tarry stools. Common side effects may include:  · diarrhea, constipation;  · nausea, stomach pain;  · green-colored stools; or  · loss of appetite. This is not a complete list of side effects and others may occur. Call your doctor for medical advice about side effects. You may report side effects to FDA at 7-666-ILB-9352. What other drugs will affect ferrous sulfate? Take your ferrous sulfate dose 2 to 6 hours before or after taking any of the following:   · an antacid;  · an antibiotic; or  · a laxative. This list is not complete. Other drugs may affect ferrous sulfate, including prescription and over-the-counter medicines, vitamins, and herbal products. Not all possible drug interactions are listed here. Where can I get more information? Your pharmacist can provide more information about ferrous sulfate. Remember, keep this and all other medicines out of the reach of children, never share your medicines with others, and use this medication only for the indication prescribed.    Every effort has been made to ensure that the information provided by [address] [place] [address] Dr LUO ('[employer]') is accurate, up-to-date, and complete, but no guarantee is made to that effect. Drug information contained herein may be time sensitive. [employer] information has been compiled for use by healthcare practitioners and consumers in the [place] and therefore [employer] does not warrant that uses outside of the [place] are appropriate, unless specifically indicated otherwise. [employer]'s drug information does not endorse drugs, diagnose patients or recommend therapy. [employer]'s drug information is an informational resource designed to assist licensed healthcare practitioners in caring for their patients and/or to serve consumers viewing this service as a supplement to, and not a substitute for, the expertise, skill, knowledge and judgment of healthcare practitioners. The absence of a warning for a given drug or drug combination in no way should be construed to indicate that the drug or drug combination is safe, effective or appropriate for any given patient. [facility] does not assume any responsibility for any aspect of healthcare administered with the aid of information [facility] provides. The information contained herein is not intended to cover all possible uses, directions, precautions, warnings, drug interactions, allergic reactions, or adverse effects. If you have questions about the drugs you are taking, check with your doctor, nurse or pharmacist.  Copyright 1070-3573 [address]. Version: 5.01. Revision date: 6/7/2021. Care instructions adapted under license by [facility] ([facility]). If you have questions about a medical condition or this instruction, always ask your healthcare professional. [place] [address] any warranty or liability for your use of this information.

## 2021-09-28 NOTE — PROGRESS NOTES
Pt presents today for routine prenatal visit. Pt denies cramping, or leaking of fluid. She states she has had a little cramping. She is here for MICHAEL had trich.

## 2021-10-01 ENCOUNTER — TELEPHONE (OUTPATIENT)
Dept: OBGYN CLINIC | Age: 41
End: 2021-10-01

## 2021-10-01 RX ORDER — METRONIDAZOLE 500 MG/1
TABLET ORAL
Qty: 4 TABLET | Refills: 0 | Status: SHIPPED | OUTPATIENT
Start: 2021-10-01 | End: 2021-10-26

## 2021-10-01 NOTE — TELEPHONE ENCOUNTER
Called to inform pt that Pt still positive for trich. Needs retreated with Flagyl. No sex until neg in 2 weeks and partner needs treated with HD.  Per ML. L/m that I sent my chart

## 2021-10-07 ENCOUNTER — ROUTINE PRENATAL (OUTPATIENT)
Dept: OBGYN CLINIC | Age: 41
End: 2021-10-07
Payer: MEDICARE

## 2021-10-07 VITALS
HEART RATE: 61 BPM | SYSTOLIC BLOOD PRESSURE: 110 MMHG | DIASTOLIC BLOOD PRESSURE: 70 MMHG | WEIGHT: 180 LBS | BODY MASS INDEX: 26.58 KG/M2

## 2021-10-07 DIAGNOSIS — O99.810 ABNORMAL GLUCOSE COMPLICATING PREGNANCY: ICD-10-CM

## 2021-10-07 DIAGNOSIS — B37.9 YEAST INFECTION: ICD-10-CM

## 2021-10-07 DIAGNOSIS — O34.12 UTERINE FIBROIDS AFFECTING PREGNANCY IN SECOND TRIMESTER: ICD-10-CM

## 2021-10-07 DIAGNOSIS — D25.9 UTERINE FIBROIDS AFFECTING PREGNANCY IN SECOND TRIMESTER: ICD-10-CM

## 2021-10-07 DIAGNOSIS — Z11.51 SCREENING FOR HPV (HUMAN PAPILLOMAVIRUS): ICD-10-CM

## 2021-10-07 DIAGNOSIS — Z86.32 HISTORY OF GESTATIONAL DIABETES: ICD-10-CM

## 2021-10-07 DIAGNOSIS — O09.522 AMA (ADVANCED MATERNAL AGE) MULTIGRAVIDA 35+, SECOND TRIMESTER: ICD-10-CM

## 2021-10-07 DIAGNOSIS — Z3A.14 14 WEEKS GESTATION OF PREGNANCY: ICD-10-CM

## 2021-10-07 DIAGNOSIS — O09.90 HIGH RISK PREGNANCY, ANTEPARTUM: Primary | ICD-10-CM

## 2021-10-07 DIAGNOSIS — A59.01 VAGINAL TRICHOMONIASIS: ICD-10-CM

## 2021-10-07 DIAGNOSIS — Z12.4 SCREENING FOR CERVICAL CANCER: ICD-10-CM

## 2021-10-07 PROCEDURE — G8419 CALC BMI OUT NRM PARAM NOF/U: HCPCS | Performed by: OBSTETRICS & GYNECOLOGY

## 2021-10-07 PROCEDURE — 99214 OFFICE O/P EST MOD 30 MIN: CPT | Performed by: OBSTETRICS & GYNECOLOGY

## 2021-10-07 PROCEDURE — G8427 DOCREV CUR MEDS BY ELIG CLIN: HCPCS | Performed by: OBSTETRICS & GYNECOLOGY

## 2021-10-07 PROCEDURE — G8484 FLU IMMUNIZE NO ADMIN: HCPCS | Performed by: OBSTETRICS & GYNECOLOGY

## 2021-10-07 PROCEDURE — 1036F TOBACCO NON-USER: CPT | Performed by: OBSTETRICS & GYNECOLOGY

## 2021-10-07 NOTE — PROGRESS NOTES
Eliza Stokes is a 39 y.o. female 14w0d who presents for routine prenatal visit. The patient was seen and evaluated. There was negative fetal movements. No contractions, bleeding or leakage of fluid. Signs and symptoms of  labor as well as labor were reviewed. The S/S of Pre-Eclampsia were reviewed with the patient in detail. She is to report any of these if they occur. She currently denies any of these. Pt has history of gestational diabetes, diet controlled. Pt has low iron, taking po iron at this time. Pt having some cramping, pt concerned about fibroids in uterus. Uterus is enlarged. Leanne Dior is a 39 y.o. female with the following history as recorded in [facility]:  Patient Active Problem List    Diagnosis Date Noted    Uterine fibroids affecting pregnancy in second trimester 10/08/2021    High risk pregnancy, antepartum 10/08/2021    History of gestational diabetes 10/08/2021    AMA (advanced maternal age) multigravida 35+, second trimester 10/08/2021     Current Outpatient Medications   Medication Sig Dispense Refill    Prenatal Vit-Fe Fumarate-FA (PRENATAL VITAMIN PO) Take 1 tablet by mouth daily      ferrous sulfate (IRON 325) 325 (65 Fe) MG tablet Take 1 tablet by mouth 2 times daily 60 tablet 5    docusate sodium (COLACE) 100 MG capsule Take 1 capsule by mouth daily as needed for Constipation 30 capsule 3    metroNIDAZOLE (FLAGYL) 500 MG tablet Take 4 pills at one time (Patient not taking: Reported on 10/7/2021) 4 tablet 0     No current facility-administered medications for this visit. Allergies: Codeine and Lactose intolerance (gi)  Past Medical History:   Diagnosis Date    Anemia      History reviewed. No pertinent surgical history.   Family History   Problem Relation Age of Onset    Colon Cancer Maternal Aunt     Cancer Paternal Uncle         melanoma     Cancer Maternal Grandmother         uterine/ovarian     Social History     Tobacco Use    Smoking status: Never Smoker    Smokeless tobacco: Never Used   Substance Use Topics    Alcohol use: No         Mother's Prenatal Vitals  BP: 110/70  Weight: 180 lb (81.6 kg)  Pulse: 61  Patient Position: Sitting  Alb/Glu  Albumin: Trace  Glucose: Negative  Prenatal Fetal Information  Fetal Heart Rate: 154  Physical Exam  Constitutional:       General: She is not in acute distress. Appearance: Normal appearance. She is not ill-appearing, toxic-appearing or diaphoretic. HENT:      Head: Normocephalic and atraumatic. Eyes:      Extraocular Movements: Extraocular movements intact. Pulmonary:      Effort: Pulmonary effort is normal. No respiratory distress. Abdominal:      Palpations: Abdomen is soft. Tenderness: There is no abdominal tenderness. Genitourinary:     Labia:         Right: No rash, tenderness, lesion or injury. Left: No rash, tenderness, lesion or injury. Vagina: Normal.      Cervix: Normal.      Uterus: Enlarged. Adnexa: Right adnexa normal and left adnexa normal.      Rectum: Normal.   Musculoskeletal:         General: No swelling or tenderness. Normal range of motion. Right lower leg: No edema. Left lower leg: No edema. Skin:     General: Skin is warm and dry. Findings: No rash. Neurological:      Mental Status: She is alert and oriented to person, place, and time. Psychiatric:         Attention and Perception: Attention and perception normal.         Mood and Affect: Mood and affect normal.         Speech: Speech normal.         Behavior: Behavior normal. Behavior is cooperative. Thought Content: Thought content normal.         Cognition and Memory: Cognition and memory normal.         Judgment: Judgment normal.               Assessment:   Diagnosis Orders   1. High risk pregnancy, antepartum     2. 14 weeks gestation of pregnancy     3. AMA (advanced maternal age) multigravida 33+, second trimester     4.  Vaginal trichomoniasis

## 2021-10-07 NOTE — PATIENT INSTRUCTIONS
Patient Education        Weeks 14 to 18 of Your Pregnancy: Care Instructions  Overview     During this time, you may start to \"show,\" so that you look pregnant to people around you. You may also notice some changes in your skin, such as itchy spots on your palms or acne on your face. Your baby is now able to pass urine. And your baby's first stool (meconium) is starting to collect in your baby's intestines. Hair is also starting to grow on your baby's head. At your next visit, between weeks 18 and 20, your doctor may do an ultrasound test. The test allows your doctor to check for certain problems. Your doctor can also tell the sex of your baby. So this a good time to think about whether you want to know. Talk to your doctor about getting a flu shot to help keep you healthy during your pregnancy. As your pregnancy moves along, it's common to worry or feel anxious. Your body is changing a lot. And you are thinking about giving birth, the health of your baby, and becoming a parent. You can talk to your doctor about any anxiety and stress you feel. Follow-up care is a key part of your treatment and safety. Be sure to make and go to all appointments, and call your doctor if you are having problems. It's also a good idea to know your test results and keep a list of the medicines you take. How can you care for yourself at home? Reduce stress    · Ask for help with cooking and housekeeping.     · Figure out who or what causes your stress. Avoid these people or situations as much as possible.     · Relax every day. Taking 10- to 15-minute breaks can make a big difference. Take a walk, listen to music, or take a warm bath.     · Learn relaxation techniques at prenatal or yoga class. Or buy a relaxation tape.     · List your fears about having a baby and becoming a parent. Share the list with someone you trust. Decide which worries are really small, and try to let them go.    Exercise    · If you did not exercise much before pregnancy, start slowly. Walking is best. [employer], and do a little more every day.     · Brisk walking, easy jogging, low-impact aerobics, water aerobics, and yoga are good choices. Some sports, such as scuba diving, horseback riding, downhill skiing, gymnastics, and water skiing, are not a good idea.     · Try to do at least 2½ hours a week of moderate exercise, such as a fast walk. One way to do this is to be active 30 minutes a day, at least 5 days a week.     · Wear loose clothing. And wear shoes and a bra that provide good support.     · Warm up and cool down to start and finish your exercise.     · If you want to use weights, be sure to use light weights. They reduce stress on your joints. Stay at the best weight for you    · Experts recommend that you gain about 1 pound a month during the first 3 months of your pregnancy.     · Experts recommend that you gain about 1 pound a week during your last 6 months of pregnancy, for a total weight gain of 25 to 35 pounds.     · If you are underweight, you will need to gain more weight (about 28 to 40 pounds).     · If you are overweight, you may not need to gain as much weight (about 15 to 25 pounds).     · If you are gaining weight too fast, use common sense. Exercise every day, and limit sweets, fast foods, and fats. Choose lean meats, fruits, and vegetables.     · If you are having twins or more, your doctor may refer you to a dietitian. Where can you learn more? Go to [email or web address] and sign in to your [employer] account. Enter R476 in the [facility] box to learn more about \"Weeks 14 to 18 of Your Pregnancy: Care Instructions. \"     If you do not have an account, please click on the \"Sign Up Now\" link. Current as of: June 16, 2021               Content Version: 13.0  © 8000-1621 Healthwise, Incorporated. Care instructions adapted under license by [facility] ([facility]).  If you have questions about a medical condition or this instruction, always ask your healthcare professional. [place] [address] any warranty or liability for your use of this information.

## 2021-10-07 NOTE — PROGRESS NOTES
Pt is here for prenatal appointment. She denies spotting, contractions. Pt has MFM r/s for 10/14. She has had some cramping, but not consistent.

## 2021-10-08 PROBLEM — Z86.32 HISTORY OF GESTATIONAL DIABETES: Status: ACTIVE | Noted: 2021-10-08

## 2021-10-08 PROBLEM — O09.90 HIGH RISK PREGNANCY, ANTEPARTUM: Status: ACTIVE | Noted: 2021-10-08

## 2021-10-08 PROBLEM — O09.522 AMA (ADVANCED MATERNAL AGE) MULTIGRAVIDA 35+, SECOND TRIMESTER: Status: ACTIVE | Noted: 2021-10-08

## 2021-10-08 PROBLEM — D25.9 UTERINE FIBROIDS AFFECTING PREGNANCY IN SECOND TRIMESTER: Status: ACTIVE | Noted: 2021-10-08

## 2021-10-08 PROBLEM — O34.12 UTERINE FIBROIDS AFFECTING PREGNANCY IN SECOND TRIMESTER: Status: ACTIVE | Noted: 2021-10-08

## 2021-10-13 ENCOUNTER — TELEPHONE (OUTPATIENT)
Dept: OBGYN CLINIC | Age: 41
End: 2021-10-13

## 2021-10-13 DIAGNOSIS — Z22.39 CARRIER OF UREAPLASMA UREALYTICUM: ICD-10-CM

## 2021-10-13 DIAGNOSIS — Z3A.14 14 WEEKS GESTATION OF PREGNANCY: Primary | ICD-10-CM

## 2021-10-13 RX ORDER — METRONIDAZOLE 500 MG/1
2000 TABLET ORAL ONCE
Qty: 4 TABLET | Refills: 0 | Status: SHIPPED | OUTPATIENT
Start: 2021-10-13 | End: 2021-10-13

## 2021-10-13 RX ORDER — AZITHROMYCIN 250 MG/1
250 TABLET, FILM COATED ORAL SEE ADMIN INSTRUCTIONS
Qty: 6 TABLET | Refills: 0 | Status: SHIPPED | OUTPATIENT
Start: 2021-10-13 | End: 2021-10-18

## 2021-10-13 NOTE — TELEPHONE ENCOUNTER
Called to inform pt that her vaginal cultures was positive for Trichomonas and Ureaplasma. Z pack and Flagyl sent in per MS. Pt needs to belkys MICHAEL visit in 2 weeks and pt's partner will need to be treated as well. L/m for pt to call back or send my chart.

## 2021-10-14 LAB
HPV COMMENT: NORMAL
HPV TYPE 16: NOT DETECTED
HPV TYPE 18: NOT DETECTED
HPVOH (OTHER TYPES): NOT DETECTED

## 2021-10-15 ENCOUNTER — TELEPHONE (OUTPATIENT)
Dept: OBGYN CLINIC | Age: 41
End: 2021-10-15

## 2021-10-15 RX ORDER — TERCONAZOLE 80 MG/1
80 SUPPOSITORY VAGINAL NIGHTLY
Qty: 3 SUPPOSITORY | Refills: 0 | Status: SHIPPED | OUTPATIENT
Start: 2021-10-15 | End: 2021-10-18

## 2021-10-15 NOTE — TELEPHONE ENCOUNTER
Called pt and no answer, I left a vm that she failed her 1 hour and needs to have a 3 hour glucose done. It needs to be fasting, she can call our office back or mychart regarding any questions.

## 2021-10-18 DIAGNOSIS — R73.09 ABNORMAL GLUCOSE: Primary | ICD-10-CM

## 2021-10-25 NOTE — PROGRESS NOTES
OP HEMATOLOGY/ONCOLOGY PROGRESS NOTE      Pt Name: Ksenia Rivas: 1980  MRN: [id number]  Referring provider: EDELMIRA Rosario  OB/GYN: EDELMIRA Bhandari  Date of evaluation: 10/26/2021    History Obtained From:  patient, patient's mother - Emy Longoria, electronic medical record    CHIEF COMPLAINT:    Chief Complaint   Patient presents with    Follow-up     Microcytic hypochromic anemia     HISTORY OF PRESENT ILLNESS:    Ravindra Austin is a 39 y.o.  female returning to the clinic for follow-up of iron deficiency anemia in pregnancy. She is 17 1/7 weeks gestation. Soni Bolden has been taking ferrous sulfate once daily, prescribed it twice daily. She is tolerating treatment has not required stool softeners. Nausea has not been exacerbated. She has mild abdominal cramping which she states is related to uterine fibroids. She denies vaginal bleeding. Royce Yoo is accompanied by her mother, Emy Longoria. Hgb is 9.8, MCV 83.4. HEMATOLOGY HISTORY: Iron deficiency anemia in pregnancy. Rachell Krishnan was seen in hematology consultation 2021, referred by EDELMIRA Bhandari for evaluation of abnormal CBC. She is  2, para 1. Royce Yoo was 13 weeks gestation in her second pregnancy at the time of hematology consultation in this clinic. She is followed by high risk OB due to advanced maternal age and history of uterine fibroid    She has a history of iron deficiency with her first pregnancy in . She required parenteral iron at that time, treated by Dr. Walter Carbajal. Medical records from Dr. Walter Carbajal were obtained, reviewed and summarized as follows:    Royce Yoo was last evaluated by Dr. Angelito Grewal around 2014, treated for iron deficiency anemia. Serology including HIV, hepatitis C, folic acid, LDH, TSH, J48, polyspecific RADMAES, haptoglobin, stool for occult blood x3, SPEP/immunofixation, flow cytometry all negative.     She was treated with parenteral iron (Venofer) 7/30/2013-8/16/2013 for a total of 1000 mg. She was treated with ferrous sulfate 325 mg po BID starting 10/23/2013, as well as Foltx 1 tablet daily. Valeria Saavedra takes no medications aside from 1 prenatal vitamin daily. She has decreased appetite and mild pregnancy related nausea. She denies vaginal bleeding, melena or hematochezia. CBC 9/9/2021: WBC 6.3, Hgb 8.7/MCV 75.4, platelets 303,977    CBC 9/28/2021: WBC of 7.16, Hgb 9.1/MCV 80, platelets 546,796. RDW 23.1. CBC findings are most consistent with iron deficiency. Labs 9/28/2021:  · CMP: unremarkable  · Iron: 135, TIBC: 487, Iron saturation: 28  · Ferritin: 5.5  · B12: 348  · Folate: 24    Rx is provided for ferrous sulfate 325 mg po BID. Valeria Saavedra will began taking 1 tablet daily x1 week to ensure tolerability, then increase to twice daily. If Eunice cannot tolerate oral iron or does not have sufficient improvement in Hgb, parenteral iron with Injectafer will be arranged. Findings were discussed with Valeria Saavedra and her mother, [facility]. All questions were answered. Past Medical History:   Diagnosis Date    Anemia     Uterine fibroid      No past surgical history on file. Current Outpatient Medications:     Prenatal Vit-Fe Fumarate-FA (PRENATAL VITAMIN PO), Take 1 tablet by mouth daily, Disp: , Rfl:     ferrous sulfate (IRON 325) 325 (65 Fe) MG tablet, Take 1 tablet by mouth 2 times daily, Disp: 60 tablet, Rfl: 5    docusate sodium (COLACE) 100 MG capsule, Take 1 capsule by mouth daily as needed for Constipation, Disp: 30 capsule, Rfl: 3   Allergies:    Allergies   Allergen Reactions    Codeine     Lactose Intolerance (Gi)      Social History     Tobacco Use    Smoking status: Never Smoker    Smokeless tobacco: Never Used   Substance Use Topics    Alcohol use: No    Drug use: Never     Family History   Problem Relation Age of Onset    Colon Cancer Maternal Aunt     Cancer Paternal Uncle         melanoma     Cancer Maternal Grandmother uterine/ovarian     Health Maintenance   Topic Date Due    Hepatitis C screen  Never done    Varicella vaccine (1 of 2 - 2-dose childhood series) Never done    COVID-19 Vaccine (1) Never done    HIV screen  Never done    DTaP/Tdap/Td vaccine (1 - Tdap) Never done    Lipid screen  Never done    Flu vaccine (1) Never done    Diabetes screen  10/19/2024    Cervical cancer screen  10/07/2026    Hepatitis A vaccine  Aged Out    Hepatitis B vaccine  Aged Out    Hib vaccine  Aged Out    Meningococcal (ACWY) vaccine  Aged Out    Pneumococcal 0-64 years Vaccine  Aged Out     Subjective   Review of Systems   Constitutional: Negative for fatigue and fever. 17 weeks pregnant   HENT: Negative for dental problem, hearing loss, mouth sores, nosebleeds, sore throat and trouble swallowing. Eyes: Negative for discharge and itching. Respiratory: Negative for cough, shortness of breath and wheezing. Cardiovascular: Negative for chest pain, palpitations and leg swelling. Gastrointestinal: Positive for nausea (improved). Negative for abdominal pain, constipation, diarrhea and vomiting. Appetite a little improved   Endocrine: Negative for cold intolerance and heat intolerance. Genitourinary: Negative for dysuria, frequency, hematuria and urgency. Musculoskeletal: Negative for arthralgias, joint swelling and myalgias. Skin: Negative for pallor and rash. Allergic/Immunologic: Positive for environmental allergies. Negative for immunocompromised state. Neurological: Negative for seizures, syncope and numbness. Hematological: Negative for adenopathy. Does not bruise/bleed easily. Psychiatric/Behavioral: Negative for agitation, behavioral problems and confusion. Objective   Physical Exam  Vitals reviewed. Constitutional:       General: She is not in acute distress. Appearance: She is well-developed. She is not toxic-appearing or diaphoretic.       Comments: Wearing a facial mask. Pregnant    HENT:      Head: Normocephalic and atraumatic. Right Ear: External ear normal.      Left Ear: External ear normal.      Nose: Nose normal.      Mouth/Throat:      Mouth: Mucous membranes are moist.   Eyes:      General: No scleral icterus. Right eye: No discharge. Left eye: No discharge. Conjunctiva/sclera: Conjunctivae normal.   Neck:      Trachea: No tracheal deviation. Cardiovascular:      Rate and Rhythm: Normal rate and regular rhythm. Pulmonary:      Effort: Pulmonary effort is normal. No respiratory distress. Breath sounds: Normal breath sounds. No wheezing or rales. Abdominal:      General: Bowel sounds are normal. There is no distension. Palpations: Abdomen is soft. Tenderness: There is no abdominal tenderness. There is no guarding. Genitourinary:     Comments: Exam deferred  Musculoskeletal:         General: No tenderness or deformity. Cervical back: Neck supple. No muscular tenderness. Comments: Normal ROM all four extremities   Lymphadenopathy:      Cervical:      Right cervical: No superficial or deep cervical adenopathy. Left cervical: No superficial or deep cervical adenopathy. Upper Body:      Right upper body: No supraclavicular adenopathy. Left upper body: No supraclavicular adenopathy. Comments:      Skin:     General: Skin is warm and dry. Findings: No rash. Neurological:      Mental Status: She is alert and oriented to person, place, and time. Comments: follows commands, non-focal   Psychiatric:         Behavior: Behavior normal. Behavior is cooperative. Thought Content: Thought content normal.         Judgment: Judgment normal.      Comments: Alert and oriented to person, place and time.        /60   Pulse 62   Ht 5' 9\" (1.753 m)   Wt 178 lb (80.7 kg)   LMP 07/01/2021 (Within Weeks)   SpO2 100%   BMI 26.29 kg/m²   Wt Readings from Last 3 Encounters:   10/26/21 178 lb (80.7 kg)   10/07/21 180 lb (81.6 kg)   09/28/21 179 lb (81.2 kg)     Labs reviewed by me:  CBC 10/26/21:   Lab Results   Component Value Date    WBC 5.63 10/26/2021    HGB 9.8 (L) 10/26/2021    HCT 31.7 (L) 10/26/2021    MCV 83.4 10/26/2021     10/26/2021    LYMPHOPCT 27.9 10/26/2021    RBC 3.80 (L) 10/26/2021    MCH 25.8 10/26/2021    MCHC 30.9 (L) 10/26/2021    RDW 22.8 (H) 10/26/2021     Labs 9/28/2021:  · CMP: unremarkable  · Iron: 135, TIBC: 487, Iron saturation: 28  · Ferritin: 5.5  · B12: 348  · Folate: 24    ASSESSMENT/PLAN:    1. Iron deficiency anemia during pregnancy    2. 17 weeks gestation of pregnancy      Hgb mildly improved at 9.8, MCV 83.4  Increase ferrous sulfate 325 mg po to BID  Monitor CBC every 4 weeks. Return to clinic 4 weeks to monitor CBC, if insufficient improvement in Hgb or intolerability to oral iron, will arrange parenteral iron with Injectafer. Medical records from Dr. Andrei Lopez received, reviewed and summarized in the hematology history above    Plan of care was discussed with patient and her mother, Cheng Pritchett. All questions answered. They are agreeable as outlined    Return in about 4 months (around 2/26/2022) for follow up with Zana Schilder, APRN. I have seen, examined and reviewed this patient medication list, appropriate labs and imaging studies. I reviewed relevant medical records and others physicians notes. I discussed the plan of care with the patient. I answered all questions to the patients satisfaction. I have also reviewed the chief complaint (CC) and part of the history (History of Present Illness (HPI), Past Family Social History [facility]), or Review of Systems (ROS) and made changes when appropriated. Dictated utilizing Dragon transcription software.           EDELMIRA Bonner  12:59 PM  10/26/2021

## 2021-10-26 ENCOUNTER — OFFICE VISIT (OUTPATIENT)
Dept: HEMATOLOGY | Age: 41
End: 2021-10-26
Payer: MEDICARE

## 2021-10-26 ENCOUNTER — HOSPITAL ENCOUNTER (OUTPATIENT)
Dept: INFUSION THERAPY | Age: 41
Discharge: HOME | End: 2021-10-26
Payer: MEDICAID

## 2021-10-26 VITALS
HEIGHT: 69 IN | SYSTOLIC BLOOD PRESSURE: 125 MMHG | BODY MASS INDEX: 26.36 KG/M2 | WEIGHT: 178 LBS | HEART RATE: 62 BPM | OXYGEN SATURATION: 100 % | DIASTOLIC BLOOD PRESSURE: 60 MMHG

## 2021-10-26 DIAGNOSIS — R79.89 ABNORMAL COMPLETE BLOOD COUNT: ICD-10-CM

## 2021-10-26 DIAGNOSIS — D50.9 IRON DEFICIENCY ANEMIA DURING PREGNANCY: Primary | ICD-10-CM

## 2021-10-26 DIAGNOSIS — Z3A.17 17 WEEKS GESTATION OF PREGNANCY: ICD-10-CM

## 2021-10-26 DIAGNOSIS — O99.019 IRON DEFICIENCY ANEMIA DURING PREGNANCY: Primary | ICD-10-CM

## 2021-10-26 LAB
BASOPHILS ABSOLUTE: 0.01 K/UL (ref 0.01–0.08)
BASOPHILS RELATIVE PERCENT: 0.2 % (ref 0.1–1.2)
EOSINOPHILS ABSOLUTE: 0.05 K/UL (ref 0.04–0.54)
EOSINOPHILS RELATIVE PERCENT: 0.9 % (ref 0.7–7)
HCT VFR BLD CALC: 31.7 % (ref 34.1–44.9)
HEMOGLOBIN: 9.8 G/DL (ref 11.2–15.7)
LYMPHOCYTES ABSOLUTE: 1.57 K/UL (ref 1.18–3.74)
LYMPHOCYTES RELATIVE PERCENT: 27.9 % (ref 19.3–53.1)
MCH RBC QN AUTO: 25.8 PG (ref 25.6–32.2)
MCHC RBC AUTO-ENTMCNC: 30.9 G/DL (ref 32.3–35.5)
MCV RBC AUTO: 83.4 FL (ref 79.4–94.8)
MONOCYTES ABSOLUTE: 0.4 K/UL (ref 0.24–0.82)
MONOCYTES RELATIVE PERCENT: 7.1 % (ref 4.7–12.5)
NEUTROPHILS ABSOLUTE: 3.6 K/UL (ref 1.56–6.13)
NEUTROPHILS RELATIVE PERCENT: 63.9 % (ref 34–71.1)
PDW BLD-RTO: 22.8 % (ref 11.7–14.4)
PLATELET # BLD: 184 K/UL (ref 182–369)
PMV BLD AUTO: 10.2 FL (ref 7.4–10.4)
RBC # BLD: 3.8 M/UL (ref 3.93–5.22)
WBC # BLD: 5.63 K/UL (ref 3.98–10.04)

## 2021-10-26 PROCEDURE — G8427 DOCREV CUR MEDS BY ELIG CLIN: HCPCS | Performed by: NURSE PRACTITIONER

## 2021-10-26 PROCEDURE — 1036F TOBACCO NON-USER: CPT | Performed by: NURSE PRACTITIONER

## 2021-10-26 PROCEDURE — 85025 COMPLETE CBC W/AUTO DIFF WBC: CPT

## 2021-10-26 PROCEDURE — G8484 FLU IMMUNIZE NO ADMIN: HCPCS | Performed by: NURSE PRACTITIONER

## 2021-10-26 PROCEDURE — 36415 COLL VENOUS BLD VENIPUNCTURE: CPT

## 2021-10-26 PROCEDURE — 99211 OFF/OP EST MAY X REQ PHY/QHP: CPT

## 2021-10-26 PROCEDURE — G8419 CALC BMI OUT NRM PARAM NOF/U: HCPCS | Performed by: NURSE PRACTITIONER

## 2021-10-26 PROCEDURE — 99213 OFFICE O/P EST LOW 20 MIN: CPT | Performed by: NURSE PRACTITIONER

## 2021-10-26 ASSESSMENT — ENCOUNTER SYMPTOMS
COUGH: 0
EYE DISCHARGE: 0
SORE THROAT: 0
VOMITING: 0
ABDOMINAL PAIN: 0
WHEEZING: 0
DIARRHEA: 0
NAUSEA: 1
CONSTIPATION: 0
SHORTNESS OF BREATH: 0
EYE ITCHING: 0
TROUBLE SWALLOWING: 0

## 2021-11-04 ENCOUNTER — ROUTINE PRENATAL (OUTPATIENT)
Dept: OBGYN CLINIC | Age: 41
End: 2021-11-04
Payer: MEDICARE

## 2021-11-04 VITALS
WEIGHT: 180 LBS | HEART RATE: 63 BPM | DIASTOLIC BLOOD PRESSURE: 68 MMHG | SYSTOLIC BLOOD PRESSURE: 110 MMHG | BODY MASS INDEX: 26.58 KG/M2

## 2021-11-04 DIAGNOSIS — O09.522 AMA (ADVANCED MATERNAL AGE) MULTIGRAVIDA 35+, SECOND TRIMESTER: Primary | ICD-10-CM

## 2021-11-04 DIAGNOSIS — B37.9 YEAST INFECTION: ICD-10-CM

## 2021-11-04 DIAGNOSIS — Z11.3 SCREENING FOR STD (SEXUALLY TRANSMITTED DISEASE): ICD-10-CM

## 2021-11-04 DIAGNOSIS — A59.01 VAGINAL TRICHOMONIASIS: ICD-10-CM

## 2021-11-04 PROCEDURE — 1036F TOBACCO NON-USER: CPT | Performed by: OBSTETRICS & GYNECOLOGY

## 2021-11-04 PROCEDURE — G8419 CALC BMI OUT NRM PARAM NOF/U: HCPCS | Performed by: OBSTETRICS & GYNECOLOGY

## 2021-11-04 PROCEDURE — G8484 FLU IMMUNIZE NO ADMIN: HCPCS | Performed by: OBSTETRICS & GYNECOLOGY

## 2021-11-04 PROCEDURE — G8427 DOCREV CUR MEDS BY ELIG CLIN: HCPCS | Performed by: OBSTETRICS & GYNECOLOGY

## 2021-11-04 PROCEDURE — 99213 OFFICE O/P EST LOW 20 MIN: CPT | Performed by: OBSTETRICS & GYNECOLOGY

## 2021-11-04 NOTE — PROGRESS NOTES
Pt is here for prenatal appointment. She denies spotting, cramping, contractions. Pt states she will get some lower back pain for a short period of time and then it will go away. Wade Mensah is a 39 y.o. female 18w0d who presents for routine prenatal visit. The patient was seen and evaluated. There was normal fetal movements. No contractions, bleeding or leakage of fluid. Signs and symptoms of  labor as well as labor were reviewed. The S/S of Pre-Eclampsia were reviewed with the patient in detail. She is to report any of these if they occur. She currently denies any of these. Broderick Adam is a 39 y.o. female with the following history as recorded in [facility]:  Patient Active Problem List    Diagnosis Date Noted    Uterine fibroids affecting pregnancy in second trimester 10/08/2021    High risk pregnancy, antepartum 10/08/2021    History of gestational diabetes 10/08/2021    AMA (advanced maternal age) multigravida 35+, second trimester 10/08/2021     Current Outpatient Medications   Medication Sig Dispense Refill    Prenatal Vit-Fe Fumarate-FA (PRENATAL VITAMIN PO) Take 1 tablet by mouth daily      ferrous sulfate (IRON 325) 325 (65 Fe) MG tablet Take 1 tablet by mouth 2 times daily 60 tablet 5    docusate sodium (COLACE) 100 MG capsule Take 1 capsule by mouth daily as needed for Constipation 30 capsule 3     No current facility-administered medications for this visit. Allergies: Codeine and Lactose intolerance (gi)  Past Medical History:   Diagnosis Date    Anemia     Uterine fibroid      No past surgical history on file.   Family History   Problem Relation Age of Onset    Colon Cancer Maternal Aunt     Cancer Paternal Uncle         melanoma     Cancer Maternal Grandmother         uterine/ovarian     Social History     Tobacco Use    Smoking status: Never Smoker    Smokeless tobacco: Never Used   Substance Use Topics    Alcohol use: No         Mother's Prenatal Vitals  BP: 110/68  Weight: 180 lb (81.6 kg)  Pulse: 63  Patient Position: Sitting  Alb/Glu  Albumin: Negative  Glucose: Negative  Prenatal Fetal Information  Fetal Heart Rate: US-152  Movement: Absent  Physical Exam  Constitutional:       General: She is not in acute distress. Appearance: Normal appearance. She is not ill-appearing or diaphoretic. HENT:      Head: Normocephalic and atraumatic. Nose: Nose normal. No rhinorrhea. Eyes:      General: No scleral icterus. Right eye: No discharge. Left eye: No discharge. Extraocular Movements: Extraocular movements intact. Pulmonary:      Effort: Pulmonary effort is normal. No respiratory distress. Genitourinary:     Labia:         Right: No rash, tenderness, lesion or injury. Left: No rash, tenderness, lesion or injury. Vagina: Vaginal discharge present. Musculoskeletal:         General: Normal range of motion. Skin:     Coloration: Skin is not pale. Findings: No erythema or rash. Neurological:      Mental Status: She is alert and oriented to person, place, and time. Psychiatric:         Attention and Perception: Attention and perception normal.         Mood and Affect: Mood and affect normal.         Speech: Speech normal.         Behavior: Behavior normal. Behavior is cooperative. Thought Content: Thought content normal.         Cognition and Memory: Cognition and memory normal.         Judgment: Judgment normal.         Microscopic wet-mount exam shows excessive bacteria, no trichomonads. Assessment:   Diagnosis Orders   1. AMA (advanced maternal age) multigravida 33+, second trimester     2. Vaginal trichomoniasis  HI WET RAMON/ W PREPARATIONS   3. Screening for STD (sexually transmitted disease)     4. Yeast infection               Plan:  1. SAB precautions   2. MICHAEL collected today. No trichomonads on microscopy but abundant discharge and bacteria. Propath sent.   Continue to abstain until results  3.  Return in 4 weeks

## 2021-11-16 DIAGNOSIS — Z22.39 CARRIER OF UREAPLASMA UREALYTICUM: Primary | ICD-10-CM

## 2021-11-16 RX ORDER — AZITHROMYCIN 500 MG/1
1000 TABLET, FILM COATED ORAL ONCE
Qty: 2 TABLET | Refills: 0 | Status: SHIPPED | OUTPATIENT
Start: 2021-11-16 | End: 2021-11-16

## 2021-11-23 ENCOUNTER — HOSPITAL ENCOUNTER (OUTPATIENT)
Dept: INFUSION THERAPY | Age: 41
Discharge: HOME | End: 2021-11-23
Payer: MEDICAID

## 2021-11-23 DIAGNOSIS — R79.89 ABNORMAL COMPLETE BLOOD COUNT: ICD-10-CM

## 2021-11-23 LAB
BASOPHILS ABSOLUTE: 0.02 K/UL (ref 0.01–0.08)
BASOPHILS RELATIVE PERCENT: 0.3 % (ref 0.1–1.2)
EOSINOPHILS ABSOLUTE: 0.07 K/UL (ref 0.04–0.54)
EOSINOPHILS RELATIVE PERCENT: 1.2 % (ref 0.7–7)
HCT VFR BLD CALC: 34.4 % (ref 34.1–44.9)
HEMOGLOBIN: 10.4 G/DL (ref 11.2–15.7)
LYMPHOCYTES ABSOLUTE: 1.81 K/UL (ref 1.18–3.74)
LYMPHOCYTES RELATIVE PERCENT: 30 % (ref 19.3–53.1)
MCH RBC QN AUTO: 28.2 PG (ref 25.6–32.2)
MCHC RBC AUTO-ENTMCNC: 30.2 G/DL (ref 32.3–35.5)
MCV RBC AUTO: 93.2 FL (ref 79.4–94.8)
MONOCYTES ABSOLUTE: 0.41 K/UL (ref 0.24–0.82)
MONOCYTES RELATIVE PERCENT: 6.8 % (ref 4.7–12.5)
NEUTROPHILS ABSOLUTE: 3.72 K/UL (ref 1.56–6.13)
NEUTROPHILS RELATIVE PERCENT: 61.7 % (ref 34–71.1)
PDW BLD-RTO: 20.6 % (ref 11.7–14.4)
PLATELET # BLD: 215 K/UL (ref 182–369)
PMV BLD AUTO: 9.9 FL (ref 7.4–10.4)
RBC # BLD: 3.69 M/UL (ref 3.93–5.22)
WBC # BLD: 6.03 K/UL (ref 3.98–10.04)

## 2021-11-23 PROCEDURE — 85025 COMPLETE CBC W/AUTO DIFF WBC: CPT

## 2021-11-23 PROCEDURE — 36415 COLL VENOUS BLD VENIPUNCTURE: CPT

## 2021-12-06 ENCOUNTER — ROUTINE PRENATAL (OUTPATIENT)
Dept: OBGYN CLINIC | Age: 41
End: 2021-12-06
Payer: MEDICARE

## 2021-12-06 VITALS
SYSTOLIC BLOOD PRESSURE: 114 MMHG | HEART RATE: 68 BPM | DIASTOLIC BLOOD PRESSURE: 68 MMHG | WEIGHT: 186 LBS | BODY MASS INDEX: 27.47 KG/M2

## 2021-12-06 DIAGNOSIS — Z3A.22 22 WEEKS GESTATION OF PREGNANCY: ICD-10-CM

## 2021-12-06 DIAGNOSIS — O34.12 UTERINE FIBROIDS AFFECTING PREGNANCY IN SECOND TRIMESTER: ICD-10-CM

## 2021-12-06 DIAGNOSIS — D25.9 UTERINE FIBROIDS AFFECTING PREGNANCY IN SECOND TRIMESTER: ICD-10-CM

## 2021-12-06 DIAGNOSIS — Z86.32 HISTORY OF GESTATIONAL DIABETES: ICD-10-CM

## 2021-12-06 DIAGNOSIS — O09.522 AMA (ADVANCED MATERNAL AGE) MULTIGRAVIDA 35+, SECOND TRIMESTER: Primary | ICD-10-CM

## 2021-12-06 PROCEDURE — 99213 OFFICE O/P EST LOW 20 MIN: CPT | Performed by: OBSTETRICS & GYNECOLOGY

## 2021-12-06 PROCEDURE — G8484 FLU IMMUNIZE NO ADMIN: HCPCS | Performed by: OBSTETRICS & GYNECOLOGY

## 2021-12-06 PROCEDURE — G8419 CALC BMI OUT NRM PARAM NOF/U: HCPCS | Performed by: OBSTETRICS & GYNECOLOGY

## 2021-12-06 PROCEDURE — G8427 DOCREV CUR MEDS BY ELIG CLIN: HCPCS | Performed by: OBSTETRICS & GYNECOLOGY

## 2021-12-06 PROCEDURE — 1036F TOBACCO NON-USER: CPT | Performed by: OBSTETRICS & GYNECOLOGY

## 2021-12-06 NOTE — PROGRESS NOTES
Pt is here for prenatal appointment. She denies spotting, cramping, contractions. Pt states she is feeling baby move a little more, but mostly flutters. Avril Mandel is a 39 y.o. female 22w4d who presents for routine prenatal visit. The patient was seen and evaluated. There was normal fetal movements. No contractions, bleeding or leakage of fluid. Signs and symptoms of  labor as well as labor were reviewed. The S/S of Pre-Eclampsia were reviewed with the patient in detail. She is to report any of these if they occur. She currently denies any of these. Lacey Gerard is a 39 y.o. female with the following history as recorded in [facility]:  Patient Active Problem List    Diagnosis Date Noted    Uterine fibroids affecting pregnancy in second trimester 10/08/2021    High risk pregnancy, antepartum 10/08/2021    History of gestational diabetes 10/08/2021    AMA (advanced maternal age) multigravida 35+, second trimester 10/08/2021     Current Outpatient Medications   Medication Sig Dispense Refill    Prenatal Vit-Fe Fumarate-FA (PRENATAL VITAMIN PO) Take 1 tablet by mouth daily      ferrous sulfate (IRON 325) 325 (65 Fe) MG tablet Take 1 tablet by mouth 2 times daily 60 tablet 5     No current facility-administered medications for this visit. Allergies: Codeine and Lactose intolerance (gi)  Past Medical History:   Diagnosis Date    Anemia     Uterine fibroid      No past surgical history on file.   Family History   Problem Relation Age of Onset    Colon Cancer Maternal Aunt     Cancer Paternal Uncle         melanoma     Cancer Maternal Grandmother         uterine/ovarian     Social History     Tobacco Use    Smoking status: Never Smoker    Smokeless tobacco: Never Used   Substance Use Topics    Alcohol use: No         Mother's Prenatal Vitals  BP: 114/68  Weight: 186 lb (84.4 kg)  Pulse: 68  Patient Position: Sitting  Alb/Glu  Albumin: Negative  Glucose: Negative  Prenatal Fetal Information  Fetal Heart Rate: US-150  Movement: Present  Physical Exam  Constitutional:       General: She is not in acute distress. Appearance: Normal appearance. She is not ill-appearing, toxic-appearing or diaphoretic. HENT:      Head: Normocephalic and atraumatic. Eyes:      Extraocular Movements: Extraocular movements intact. Pulmonary:      Effort: Pulmonary effort is normal. No respiratory distress. Abdominal:      Palpations: Abdomen is soft. Tenderness: There is no abdominal tenderness. Musculoskeletal:         General: No swelling or tenderness. Normal range of motion. Right lower leg: No edema. Left lower leg: No edema. Skin:     General: Skin is warm and dry. Findings: No rash. Neurological:      Mental Status: She is alert and oriented to person, place, and time. Psychiatric:         Attention and Perception: Attention and perception normal.         Mood and Affect: Mood and affect normal.         Speech: Speech normal.         Behavior: Behavior normal. Behavior is cooperative. Thought Content: Thought content normal.         Cognition and Memory: Cognition and memory normal.         Judgment: Judgment normal.           Assessment:   Diagnosis Orders   1. AMA (advanced maternal age) multigravida 33+, second trimester     2. 22 weeks gestation of pregnancy     3. History of gestational diabetes     4. Uterine fibroids affecting pregnancy in second trimester               Plan:  1. PTL precautions   2. RGS and CBC next visit. Instructions given  3.  Return in 4 weeks

## 2021-12-21 ENCOUNTER — HOSPITAL ENCOUNTER (OUTPATIENT)
Dept: INFUSION THERAPY | Age: 41
Discharge: HOME | End: 2021-12-21
Payer: MEDICAID

## 2021-12-21 DIAGNOSIS — R79.89 ABNORMAL COMPLETE BLOOD COUNT: ICD-10-CM

## 2021-12-21 PROCEDURE — 36415 COLL VENOUS BLD VENIPUNCTURE: CPT

## 2021-12-21 PROCEDURE — 85025 COMPLETE CBC W/AUTO DIFF WBC: CPT

## 2021-12-22 LAB
BASOPHILS ABSOLUTE: 0.02 K/UL (ref 0.01–0.08)
BASOPHILS RELATIVE PERCENT: 0.3 % (ref 0.1–1.2)
EOSINOPHILS ABSOLUTE: 0.08 K/UL (ref 0.04–0.54)
EOSINOPHILS RELATIVE PERCENT: 1.1 % (ref 0.7–7)
HCT VFR BLD CALC: 31.1 % (ref 34.1–44.9)
HEMOGLOBIN: 10.2 G/DL (ref 11.2–15.7)
LYMPHOCYTES ABSOLUTE: 1.79 K/UL (ref 1.18–3.74)
LYMPHOCYTES RELATIVE PERCENT: 25.1 % (ref 19.3–53.1)
MCH RBC QN AUTO: 29.6 PG (ref 25.6–32.2)
MCHC RBC AUTO-ENTMCNC: 32.8 G/DL (ref 32.3–35.5)
MCV RBC AUTO: 90.1 FL (ref 79.4–94.8)
MONOCYTES ABSOLUTE: 0.6 K/UL (ref 0.24–0.82)
MONOCYTES RELATIVE PERCENT: 8.4 % (ref 4.7–12.5)
NEUTROPHILS ABSOLUTE: 4.65 K/UL (ref 1.56–6.13)
NEUTROPHILS RELATIVE PERCENT: 65.1 % (ref 34–71.1)
PDW BLD-RTO: 17.1 % (ref 11.7–14.4)
PLATELET # BLD: 226 K/UL (ref 182–369)
PMV BLD AUTO: 9.6 FL (ref 7.4–10.4)
RBC # BLD: 3.45 M/UL (ref 3.93–5.22)
WBC # BLD: 7.14 K/UL (ref 3.98–10.04)

## 2022-01-03 ENCOUNTER — ROUTINE PRENATAL (OUTPATIENT)
Dept: OBGYN CLINIC | Age: 42
End: 2022-01-03
Payer: MEDICARE

## 2022-01-03 VITALS
BODY MASS INDEX: 28.35 KG/M2 | DIASTOLIC BLOOD PRESSURE: 74 MMHG | SYSTOLIC BLOOD PRESSURE: 120 MMHG | HEART RATE: 68 BPM | WEIGHT: 192 LBS

## 2022-01-03 DIAGNOSIS — Z86.32 HISTORY OF GESTATIONAL DIABETES: ICD-10-CM

## 2022-01-03 DIAGNOSIS — Z3A.26 26 WEEKS GESTATION OF PREGNANCY: ICD-10-CM

## 2022-01-03 DIAGNOSIS — O34.12 UTERINE FIBROIDS AFFECTING PREGNANCY IN SECOND TRIMESTER: ICD-10-CM

## 2022-01-03 DIAGNOSIS — D25.9 UTERINE FIBROIDS AFFECTING PREGNANCY IN SECOND TRIMESTER: ICD-10-CM

## 2022-01-03 DIAGNOSIS — O09.92 SUPERVISION OF HIGH RISK PREGNANCY IN SECOND TRIMESTER: ICD-10-CM

## 2022-01-03 DIAGNOSIS — O09.522 AMA (ADVANCED MATERNAL AGE) MULTIGRAVIDA 35+, SECOND TRIMESTER: Primary | ICD-10-CM

## 2022-01-03 DIAGNOSIS — O09.92 SUPERVISION OF HIGH RISK PREGNANCY IN SECOND TRIMESTER: Primary | ICD-10-CM

## 2022-01-03 PROCEDURE — 1036F TOBACCO NON-USER: CPT | Performed by: OBSTETRICS & GYNECOLOGY

## 2022-01-03 PROCEDURE — G8427 DOCREV CUR MEDS BY ELIG CLIN: HCPCS | Performed by: OBSTETRICS & GYNECOLOGY

## 2022-01-03 PROCEDURE — G8484 FLU IMMUNIZE NO ADMIN: HCPCS | Performed by: OBSTETRICS & GYNECOLOGY

## 2022-01-03 PROCEDURE — G8419 CALC BMI OUT NRM PARAM NOF/U: HCPCS | Performed by: OBSTETRICS & GYNECOLOGY

## 2022-01-03 PROCEDURE — 99213 OFFICE O/P EST LOW 20 MIN: CPT | Performed by: OBSTETRICS & GYNECOLOGY

## 2022-01-03 NOTE — PROGRESS NOTES
Mariola Jimenez is a 39 y.o. female 26w4d who presents for routine prenatal visit. The patient was seen and evaluated. There was normal fetal movements. No contractions, bleeding or leakage of fluid. Signs and symptoms of  labor as well as labor were reviewed. The S/S of Pre-Eclampsia were reviewed with the patient in detail. She is to report any of these if they occur. She currently denies any of these. Danette Rader is a 39 y.o. female with the following history as recorded in [facility]:  Patient Active Problem List    Diagnosis Date Noted    Uterine fibroids affecting pregnancy in second trimester 10/08/2021    High risk pregnancy, antepartum 10/08/2021    History of gestational diabetes 10/08/2021    AMA (advanced maternal age) multigravida 35+, second trimester 10/08/2021     Current Outpatient Medications   Medication Sig Dispense Refill    Prenatal Vit-Fe Fumarate-FA (PRENATAL VITAMIN PO) Take 1 tablet by mouth daily      ferrous sulfate (IRON 325) 325 (65 Fe) MG tablet Take 1 tablet by mouth 2 times daily 60 tablet 5     No current facility-administered medications for this visit. Allergies: Codeine and Lactose intolerance (gi)  Past Medical History:   Diagnosis Date    Anemia     Uterine fibroid      History reviewed. No pertinent surgical history.   Family History   Problem Relation Age of Onset    Colon Cancer Maternal Aunt     Cancer Paternal Uncle         melanoma     Cancer Maternal Grandmother         uterine/ovarian     Social History     Tobacco Use    Smoking status: Never Smoker    Smokeless tobacco: Never Used   Substance Use Topics    Alcohol use: No         Mother's Prenatal Vitals  BP: 120/74  Weight: 192 lb (87.1 kg)  Pulse: 68  Patient Position: Sitting  Alb/Glu  Albumin: Negative  Glucose: Negative  Prenatal Fetal Information  Fundal Height (cm): 27 cm  Fetal Heart Rate: 146  Movement: Present  Physical Exam  Constitutional:       General: She is not in acute distress. Appearance: Normal appearance. She is not ill-appearing, toxic-appearing or diaphoretic. HENT:      Head: Normocephalic and atraumatic. Eyes:      Extraocular Movements: Extraocular movements intact. Pulmonary:      Effort: Pulmonary effort is normal. No respiratory distress. Abdominal:      Palpations: Abdomen is soft. Tenderness: There is no abdominal tenderness. Musculoskeletal:         General: No swelling or tenderness. Normal range of motion. Right lower leg: No edema. Left lower leg: No edema. Skin:     General: Skin is warm and dry. Findings: No rash. Neurological:      Mental Status: She is alert and oriented to person, place, and time. Psychiatric:         Attention and Perception: Attention and perception normal.         Mood and Affect: Mood and affect normal.         Speech: Speech normal.         Behavior: Behavior normal. Behavior is cooperative. Thought Content: Thought content normal.         Cognition and Memory: Cognition and memory normal.         Judgment: Judgment normal.           The patient had her 28 week labs in process. T-Dap Vaccine (27-36 weeks): awaiting    Assessment:   Diagnosis Orders   1. AMA (advanced maternal age) multigravida 33+, second trimester     2. 26 weeks gestation of pregnancy     3. History of gestational diabetes     4. Uterine fibroids affecting pregnancy in second trimester     5. Supervision of high risk pregnancy in second trimester               Plan:  1. PTL precautions   2. RGS and CBC today  3. Discussed Birthing plan. Questions answered  4.  Return in 3 weeks

## 2022-01-18 ENCOUNTER — HOSPITAL ENCOUNTER (OUTPATIENT)
Dept: INFUSION THERAPY | Age: 42
Discharge: HOME | End: 2022-01-18
Payer: MEDICAID

## 2022-01-18 DIAGNOSIS — R79.89 ABNORMAL COMPLETE BLOOD COUNT: ICD-10-CM

## 2022-01-18 LAB
BASOPHILS ABSOLUTE: 0.02 K/UL (ref 0.01–0.08)
BASOPHILS RELATIVE PERCENT: 0.2 % (ref 0.1–1.2)
EOSINOPHILS ABSOLUTE: 0.1 K/UL (ref 0.04–0.54)
EOSINOPHILS RELATIVE PERCENT: 1.2 % (ref 0.7–7)
HCT VFR BLD CALC: 32.8 % (ref 34.1–44.9)
HEMOGLOBIN: 10.5 G/DL (ref 11.2–15.7)
LYMPHOCYTES ABSOLUTE: 1.64 K/UL (ref 1.18–3.74)
LYMPHOCYTES RELATIVE PERCENT: 20.3 % (ref 19.3–53.1)
MCH RBC QN AUTO: 29.6 PG (ref 25.6–32.2)
MCHC RBC AUTO-ENTMCNC: 32 G/DL (ref 32.3–35.5)
MCV RBC AUTO: 92.4 FL (ref 79.4–94.8)
MONOCYTES ABSOLUTE: 0.66 K/UL (ref 0.24–0.82)
MONOCYTES RELATIVE PERCENT: 8.2 % (ref 4.7–12.5)
NEUTROPHILS ABSOLUTE: 5.64 K/UL (ref 1.56–6.13)
NEUTROPHILS RELATIVE PERCENT: 70.1 % (ref 34–71.1)
PDW BLD-RTO: 15.4 % (ref 11.7–14.4)
PLATELET # BLD: 224 K/UL (ref 182–369)
PMV BLD AUTO: 9.6 FL (ref 7.4–10.4)
RBC # BLD: 3.55 M/UL (ref 3.93–5.22)
WBC # BLD: 8.06 K/UL (ref 3.98–10.04)

## 2022-01-18 PROCEDURE — 85025 COMPLETE CBC W/AUTO DIFF WBC: CPT

## 2022-01-18 PROCEDURE — 36415 COLL VENOUS BLD VENIPUNCTURE: CPT

## 2022-01-24 ENCOUNTER — ROUTINE PRENATAL (OUTPATIENT)
Dept: OBGYN CLINIC | Age: 42
End: 2022-01-24
Payer: MEDICARE

## 2022-01-24 VITALS
BODY MASS INDEX: 28.94 KG/M2 | WEIGHT: 196 LBS | SYSTOLIC BLOOD PRESSURE: 126 MMHG | HEART RATE: 78 BPM | DIASTOLIC BLOOD PRESSURE: 74 MMHG

## 2022-01-24 DIAGNOSIS — O09.92 SUPERVISION OF HIGH RISK PREGNANCY IN SECOND TRIMESTER: ICD-10-CM

## 2022-01-24 DIAGNOSIS — O24.410 DIET CONTROLLED GESTATIONAL DIABETES MELLITUS (GDM) IN THIRD TRIMESTER: ICD-10-CM

## 2022-01-24 DIAGNOSIS — O09.522 AMA (ADVANCED MATERNAL AGE) MULTIGRAVIDA 35+, SECOND TRIMESTER: Primary | ICD-10-CM

## 2022-01-24 DIAGNOSIS — Z3A.29 29 WEEKS GESTATION OF PREGNANCY: ICD-10-CM

## 2022-01-24 PROCEDURE — 99213 OFFICE O/P EST LOW 20 MIN: CPT | Performed by: OBSTETRICS & GYNECOLOGY

## 2022-01-24 PROCEDURE — G8419 CALC BMI OUT NRM PARAM NOF/U: HCPCS | Performed by: OBSTETRICS & GYNECOLOGY

## 2022-01-24 PROCEDURE — G8427 DOCREV CUR MEDS BY ELIG CLIN: HCPCS | Performed by: OBSTETRICS & GYNECOLOGY

## 2022-01-24 PROCEDURE — G8484 FLU IMMUNIZE NO ADMIN: HCPCS | Performed by: OBSTETRICS & GYNECOLOGY

## 2022-01-24 PROCEDURE — 1036F TOBACCO NON-USER: CPT | Performed by: OBSTETRICS & GYNECOLOGY

## 2022-01-24 NOTE — PATIENT INSTRUCTIONS
Patient Education        Weeks 26 to 30 of Your Pregnancy: Care Instructions  Overview     You are now entering your last trimester of pregnancy. Your baby is growing quickly. Radha Borne probably feel your baby moving around more often. Your doctor may ask you to count your baby's kicks. Your back may ache as your body gets used to your baby's size and length. If you haven't already had the Tdap shot during this pregnancy, talk to your doctor about getting it. It will help protect your  against pertussis infection. During this time, it's important to take care of yourself and pay attention to what your body needs. If you feel sexual, you can explore ways to be close with your partner that match your comfort and desire. Follow-up care is a key part of your treatment and safety. Be sure to make and go to all appointments, and call your doctor if you are having problems. It's also a good idea to know your test results and keep a list of the medicines you take. How can you care for yourself at home? Take it easy at work  · Take frequent breaks. If possible, stop working when you are tired, and rest during your lunch hour. · Take bathroom breaks every 2 hours. · Change positions often. If you sit for long periods, stand up and walk around. · When you stand for a long time, keep one foot on a low stool with your knee bent. After standing a lot, sit with your feet up. · Avoid fumes, chemicals, and tobacco smoke. Be sexual in your own way  · Having sex during pregnancy is okay, unless your doctor tells you not to. · You may be very interested in sex, or you may have no interest at all. · Your growing belly can make it hard to find a good position during intercourse. [place] and explore. · You may get cramps in your uterus when your partner touches your breasts. · A back rub may relieve the backache or cramps that sometimes follow orgasm. Learn about  labor  · Watch for signs of  labor. You may be going into labor if:  ? You have menstrual-like cramps, with or without nausea. ? You have about 6 or more contractions in 1 hour, even after you have had a glass of water and are resting. ? You have a low, dull backache that does not go away when you change your position. ? You have pain or pressure in your pelvis that comes and goes in a pattern. ? You have intestinal cramping or flu-like symptoms, with or without diarrhea.  ? You notice an increase or change in your vaginal discharge. Discharge may be heavy, mucus-like, watery, or streaked with blood. ? Your water breaks. · If you think you have  labor:  ? Drink 2 or 3 glasses of water or juice. Not drinking enough fluids can cause contractions. ? Stop what you are doing, and empty your bladder. Then lie down on your left side for at least 1 hour. ? While lying on your side, find your breast bone. Put your fingers in the soft spot just below it. Move your fingers down toward your belly button to find the top of your uterus. Check to see if it is tight. ? Contractions can be weak or strong. Record your contractions for an hour. Time a contraction from the start of one contraction to the start of the next one.  ? Single or several strong contractions without a pattern are called [place] contractions. They are practice contractions but not the start of labor. They often stop if you change what you are doing. ? Call your doctor if you have regular contractions. Where can you learn more? Go to https://[employer].healthBiocycle. org and sign in to your [employer] account. Enter K194 in the [facility] box to learn more about \"Weeks 26 to 30 of Your Pregnancy: Care Instructions. \"     If you do not have an account, please click on the \"Sign Up Now\" link. Current as of: 2021               Content Version: 13.1  © 4003-3736 Healthwise, Incorporated. Care instructions adapted under license by [facility] ([facility]).  If you have questions about a medical condition or this instruction, always ask your healthcare professional. Claudia Valentino any warranty or liability for your use of this information. Patient Education        Gestational Diabetes Diet: Care Instructions  Your Care Instructions     Gestational diabetes is a form of diabetes that can happen during pregnancy. It usually goes away after the baby is born. Diabetes means that your pancreas can't make enough insulin or your body does not use insulin properly. Insulin helps sugar enter your cells, where it is used for energy. You may be able to control your blood sugar while you are pregnant by eating a healthy diet and getting regular exercise. A dietitian or certified diabetes educator (CDE) can help you make a food plan. This plan will help control your blood sugar and provide good nutrition for you and your baby. If diet and exercise don't lower or control your blood sugar, you may need diabetes medicine or insulin. Follow-up care is a key part of your treatment and safety. Be sure to make and go to all appointments, and call your doctor if you are having problems. It's also a good idea to know your test results and keep a list of the medicines you take. How can you care for yourself at home? · Learn which foods have carbohydrate. Eating too much carbohydrate will cause your blood sugar to go too high. Carbohydrate foods include:  ? Breads, cereals, pasta, and rice. ? Dried beans and starchy vegetables, like corn, peas, and potatoes. ? Fruits and fruit juice, milk, and yogurt. ? Candy, table sugar, soda pop, and drinks sweetened with sugar. · Learn how much carbohydrate you need each day. A dietitian or certified diabetes educator (CDE) can teach you how to keep track of how much carbohydrate you eat. · Try to eat the same amount of carbohydrate at each meal. This will help keep your blood sugar steady.  Do not save up your daily allowance of carbohydrate to eat at one meal.  · Limit foods that have added sugar. This includes candy, desserts, and soda pop. These foods need to be counted as part of your total carbohydrate intake for the day. · Do not drink alcohol. Alcohol is not safe for you or your baby. · Do not skip meals. Your blood sugar may drop too low if you skip meals and use insulin. · Write down what you eat every day. Review your record with your dietitian or CDE to see if you are eating the right amounts of foods. · Check your blood sugar first thing in the morning before you eat. Then check your blood sugar 1 to 2 hours after the first bite of each meal (or as your doctor recommends). This will help you see how the food you eat affects your blood sugar. Keep track of these levels. Share the record with your doctor. When should you call for help? Watch closely for changes in your health, and be sure to contact your doctor if:    · You have questions about your diet.     · You often have problems with high or low blood sugar. Where can you learn more? Go to [email or web address] org and sign in to your [employer] account. Enter M291 in the [employer] box to learn more about \"Gestational Diabetes Diet: Care Instructions. \"     If you do not have an account, please click on the \"Sign Up Now\" link. Current as of: July 28, 2021               Content Version: 13.1  © 6457-9462 Healthwise, Incorporated. Care instructions adapted under license by [facility] ([facility]). If you have questions about a medical condition or this instruction, always ask your healthcare professional. [place] [address] any warranty or liability for your use of this information.

## 2022-01-24 NOTE — PROGRESS NOTES
Francisca Miller is a 39 y.o. female 32w2d who presents for routine prenatal visit. The patient was seen and evaluated. There was positive fetal movements. No contractions, bleeding or leakage of fluid. Signs and symptoms of  labor as well as labor were reviewed. The S/S of Pre-Eclampsia were reviewed with the patient in detail. She is to report any of these if they occur. She currently denies any of these. Simran De La Cruz is a 39 y.o. female with the following history as recorded in [facility]:  Patient Active Problem List    Diagnosis Date Noted    Uterine fibroids affecting pregnancy in second trimester 10/08/2021    High risk pregnancy, antepartum 10/08/2021    History of gestational diabetes 10/08/2021    AMA (advanced maternal age) multigravida 35+, second trimester 10/08/2021     Current Outpatient Medications   Medication Sig Dispense Refill    Prenatal Vit-Fe Fumarate-FA (PRENATAL VITAMIN PO) Take 1 tablet by mouth daily      ferrous sulfate (IRON 325) 325 (65 Fe) MG tablet Take 1 tablet by mouth 2 times daily 60 tablet 5     No current facility-administered medications for this visit. Allergies: Codeine and Lactose intolerance (gi)  Past Medical History:   Diagnosis Date    Anemia     Uterine fibroid      History reviewed. No pertinent surgical history.   Family History   Problem Relation Age of Onset    Colon Cancer Maternal Aunt     Cancer Paternal Uncle         melanoma     Cancer Maternal Grandmother         uterine/ovarian     Social History     Tobacco Use    Smoking status: Never Smoker    Smokeless tobacco: Never Used   Substance Use Topics    Alcohol use: No         Mother's Prenatal Vitals  BP: 126/74  Weight: 196 lb (88.9 kg)  Pulse: 78  Patient Position: Sitting  Alb/Glu  Albumin: 1+  Glucose: 2+  Prenatal Fetal Information  Fundal Height (cm): 31 cm  Fetal Heart Rate: US-141  Movement: Present  Physical Exam  Constitutional:       General: She is not in acute distress. Appearance: Normal appearance. She is not ill-appearing, toxic-appearing or diaphoretic. HENT:      Head: Normocephalic and atraumatic. Eyes:      Extraocular Movements: Extraocular movements intact. Pulmonary:      Effort: Pulmonary effort is normal. No respiratory distress. Abdominal:      Palpations: Abdomen is soft. Tenderness: There is no abdominal tenderness. Musculoskeletal:         General: No swelling or tenderness. Normal range of motion. Right lower leg: No edema. Left lower leg: No edema. Skin:     General: Skin is warm and dry. Findings: No rash. Neurological:      Mental Status: She is alert and oriented to person, place, and time. Psychiatric:         Attention and Perception: Attention and perception normal.         Mood and Affect: Mood and affect normal.         Speech: Speech normal.         Behavior: Behavior normal. Behavior is cooperative. Thought Content: Thought content normal.         Cognition and Memory: Cognition and memory normal.         Judgment: Judgment normal.           The patient had her 28 week labs completed. T-Dap Vaccine (27-36 weeks): awaiting    The patient was instructed on fetal kick counts and was given a kick sheet to complete every 8 hours. She was instructed that the baby should move at a minimum of ten times within one hour after a meal. The patient was instructed to lay down on her left side twenty minutes after eating and count movements for up to one hour with a target value of ten movements. She was instructed to notify the office if she did not make that target after two attempts or if after any attempt there was less than four movements. The patient reports that the targets have been made Yes. Assessment:   Diagnosis Orders   1. AMA (advanced maternal age) multigravida 33+, second trimester     2. 29 weeks gestation of pregnancy     3.  Supervision of high risk pregnancy in second trimester 4. Diet controlled gestational diabetes mellitus (GDM) in third trimester  MyChart Glucose Flowsheet             Plan:  1. PTL precautions   2. Return in 2 weeks  3. Told pt that due to her sugar in her urine, her postprandial glucose number (137 today more than 2 hours PP) and the fact that she failed her first glucose, she is considered a gestational diabetic. 4. Will send referral to [facility] for diabetic teaching and supplies. 5.  Instructions for monitoring glucose reviewed. Flowsheet for Mychart ordered    I Quentin Mcknight, am scribing for and in the presence of Dr. Brent Bullock. I, Dr. Brent Bullock, personally performed the services described in this documentation as scribed by Quentin Mcknight in my presence, and it is both accurate and complete.

## 2022-02-08 ENCOUNTER — ROUTINE PRENATAL (OUTPATIENT)
Dept: OBGYN CLINIC | Age: 42
End: 2022-02-08
Payer: MEDICARE

## 2022-02-08 VITALS
DIASTOLIC BLOOD PRESSURE: 72 MMHG | SYSTOLIC BLOOD PRESSURE: 119 MMHG | WEIGHT: 197 LBS | HEART RATE: 68 BPM | BODY MASS INDEX: 29.09 KG/M2

## 2022-02-08 DIAGNOSIS — O09.92 SUPERVISION OF HIGH RISK PREGNANCY IN SECOND TRIMESTER: ICD-10-CM

## 2022-02-08 DIAGNOSIS — O09.522 AMA (ADVANCED MATERNAL AGE) MULTIGRAVIDA 35+, SECOND TRIMESTER: Primary | ICD-10-CM

## 2022-02-08 DIAGNOSIS — Z3A.31 31 WEEKS GESTATION OF PREGNANCY: ICD-10-CM

## 2022-02-08 DIAGNOSIS — O24.410 DIET CONTROLLED GESTATIONAL DIABETES MELLITUS (GDM) IN THIRD TRIMESTER: ICD-10-CM

## 2022-02-08 PROCEDURE — 1036F TOBACCO NON-USER: CPT | Performed by: NURSE PRACTITIONER

## 2022-02-08 PROCEDURE — G8419 CALC BMI OUT NRM PARAM NOF/U: HCPCS | Performed by: NURSE PRACTITIONER

## 2022-02-08 PROCEDURE — G8427 DOCREV CUR MEDS BY ELIG CLIN: HCPCS | Performed by: NURSE PRACTITIONER

## 2022-02-08 PROCEDURE — G8484 FLU IMMUNIZE NO ADMIN: HCPCS | Performed by: NURSE PRACTITIONER

## 2022-02-08 PROCEDURE — 99213 OFFICE O/P EST LOW 20 MIN: CPT | Performed by: NURSE PRACTITIONER

## 2022-02-08 NOTE — PROGRESS NOTES
Linda Reyes is here for a return obstetrical visit. Today she is 31w5d weeks EGA. She is doing well has no unusual complaints. She  does not have vaginal bleeding, leaking of fluid, contractions. She does not have blurred vision, SOB, or increased swelling in legs or face. Pt does feel fetal movement regularly. Reports all FBS <100. 2 hour PP <140, except for 1 time per patient. Objective: Mother's Prenatal Vitals  BP: 119/72  Weight: 197 lb (89.4 kg)  Pulse: 68  Patient Position: Sitting  Prenatal Fetal Information  Fetal Heart Rate: US-  Movement: Present  Pt is A&Ox3, in no acute distress. Normocephalic, atraumatic. PERRL. Resp even and non-labored. Skin pink, warm & dry. Gravid abdomen. GALVAN's well. Gait steady. Assessment:  IUP at 31w5d wks      Diagnosis Orders   1. AMA (advanced maternal age) multigravida 33+, second trimester     2. Supervision of high risk pregnancy in second trimester     3. Diet controlled gestational diabetes mellitus (GDM) in third trimester     4. 31 weeks gestation of pregnancy       Plan:Pt counseled on GHTN precautions, Kick count and  labor  Continue with routine prenatal care. Continue glucose monitoring at home and diet  F/u per [facility] for fibroid surveillance  RTC in 2 wk for prenatal visit    MEDICATIONS:  No orders of the defined types were placed in this encounter. ORDERS:  No orders of the defined types were placed in this encounter.

## 2022-02-08 NOTE — PATIENT INSTRUCTIONS
Patient Education        Weeks 30 to 28 of Your Pregnancy: Care Instructions  Overview     You've made it to the final months of your pregnancy! By now your baby is really starting to look like a baby, with hair and plump skin. As you enter the final weeks of pregnancy, the reality of having a baby may start to set in. This is a good time to set up a safe nursery and find quality  if needed. Doing this stuff ahead of time will allow you to focus on caring for and enjoying your new baby. You may also want to take a tour of your hospital's labor and delivery unit. This will help you get a better idea of what to expect while you're in the hospital.  During these last months, be sure to take good care of yourself. Pay attention to what your body needs. If your doctor says it's okay for you to work, don't push yourself too hard. If you haven't already had the Tdap shot during this pregnancy, talk to your doctor about getting it. It will help protect your  against pertussis infection. Follow-up care is a key part of your treatment and safety. Be sure to make and go to all appointments, and call your doctor if you are having problems. It's also a good idea to know your test results and keep a list of the medicines you take. How can you care for yourself at home? Pay attention to your baby's movements  · You should feel your baby move several times every day. · Your baby now turns less, and kicks and jabs more. · Your baby sleeps 20 to 45 minutes at a time and is more active at certain times of day. · If your doctor wants you to count your baby's kicks:  ? Empty your bladder, and lie on your side or relax in a comfortable chair. ? Write down your start time. ? Pay attention only to your baby's movements. Count any movement except hiccups. ? After you have counted 10 movements, write down your stop time. ? Write down how many minutes it took for your baby to move 10 times.   ? If an hour goes by and you have not recorded 10 movements, have something to eat or drink and then count for another hour. If you don't record at least 10 movements in the 2-hour period, call your doctor. Ease heartburn  · Eat small, frequent meals. · Do not eat chocolate, peppermint, or very spicy foods. Avoid drinks with caffeine, such as coffee, tea, and sodas. · Avoid bending over or lying down after meals. · Take a short walk after you eat. · If heartburn is a problem at night, do not eat for 2 hours before bedtime. · Take antacids like Mylanta, Maalox, Rolaids, or Tums. Do not take antacids that have sodium bicarbonate. Care for varicose veins  · Varicose veins are blood vessels that stretch out with the extra blood during pregnancy. Your legs may ache or throb. Most varicose veins will go away after the birth. · Avoid standing for long periods of time. Sit with your legs crossed at the ankles, not the knees. · Sit with your feet propped up. · Avoid tight clothing or stockings. Wear support hose. · Exercise regularly. Try walking for at least 30 minutes a day. Where can you learn more? Go to https://[employer]. org and sign in to your [employer] account. Enter A429 in the [facility] box to learn more about \"Weeks 30 to 32 of Your Pregnancy: Care Instructions. \"     If you do not have an account, please click on the \"Sign Up Now\" link. Current as of: June 16, 2021               Content Version: 13.1  © 2006-2021 Healthwise, Incorporated. Care instructions adapted under license by [facility] ([facility]). If you have questions about a medical condition or this instruction, always ask your healthcare professional. [place] [address] any warranty or liability for your use of this information.

## 2022-02-14 NOTE — PROGRESS NOTES
OP HEMATOLOGY/ONCOLOGY PROGRESS NOTE      Pt Name: Jenifer Axon: 1980  MRN: [id number]  Referring provider: EDELMIRA Rivera  OB/GYN: EDELMIRA Zavala  Date of evaluation: 2/15/2022    History Obtained From:  patient, electronic medical record    CHIEF COMPLAINT:    Chief Complaint   Patient presents with    Follow-up     Iron deficiency anemia during pregnancy      HISTORY OF PRESENT ILLNESS:    Anjel Dangelo is a 39 y.o.  female returning to the clinic for follow-up of iron deficiency anemia in pregnancy. She is 32w 5d EGA. Juan José Lanza is taking ferrous sulfate 650 mg daily. She is tolerating iron without difficulty. She denies GI complaint. Juan José Lanza states she has had mild Maldonado Womack contractions, otherwise no problems. She had a stable prenatal exam 2022 with EDELMIRA Chang. CBC trend:      Hgb is stable at 10.5, MCV 91.6 today, 2/15/2022. HEMATOLOGY HISTORY: Iron deficiency anemia in pregnancy. Laurie Carrera was seen in hematology consultation 2021, referred by EDELMIRA Zavala for evaluation of abnormal CBC. She is  2, para 1. Juan José Lanza was 13 weeks gestation in her second pregnancy at the time of hematology consultation in this clinic. She is followed by high risk OB due to advanced maternal age and history of uterine fibroid    She has a history of iron deficiency with her first pregnancy in . She required parenteral iron at that time, treated by Dr. Taras Maldonado. Medical records from Dr. Taras Maldonado were obtained, reviewed and summarized as follows:    Juan José Lanza was last evaluated by Dr. Deja Cannon around 2014, treated for iron deficiency anemia. Serology including HIV, hepatitis C, folic acid, LDH, TSH, Z05, polyspecific RADAMES, haptoglobin, stool for occult blood x3, SPEP/immunofixation, flow cytometry all negative. She was treated with parenteral iron (Venofer) 2013-2013 for a total of 1000 mg.   She was treated with ferrous sulfate 325 mg po BID starting 10/23/2013, as well as Foltx 1 tablet daily. Malcolm Gooden takes no medications aside from 1 prenatal vitamin daily. She has decreased appetite and mild pregnancy related nausea. She denies vaginal bleeding, melena or hematochezia. CBC 9/9/2021: WBC 6.3, Hgb 8.7/MCV 75.4, platelets 073,454    CBC 9/28/2021: WBC of 7.16, Hgb 9.1/MCV 80, platelets 099,741. RDW 23.1. CBC findings are most consistent with iron deficiency. Labs 9/28/2021:  · CMP: unremarkable  · Iron: 135, TIBC: 487, Iron saturation: 28  · Ferritin: 5.5  · B12: 348  · Folate: 24    Rx is provided for ferrous sulfate 325 mg po BID. Malcolm Gooden will began taking 1 tablet daily x1 week to ensure tolerability, then increase to twice daily. If Eunice cannot tolerate oral iron or does not have sufficient improvement in Hgb, parenteral iron with Injectafer will be arranged. Findings were discussed with Malcolm Gooden and her mother, Nuvia Alvarado. All questions were answered. Hgb is stable at 10.5, MCV 91.6 at follow-up on 2/15/2022 (~33 weeks gestation). Past Medical History:   Diagnosis Date    Anemia     Uterine fibroid      No past surgical history on file. Current Outpatient Medications:     Prenatal Vit-Fe Fumarate-FA (PRENATAL VITAMIN PO), Take 1 tablet by mouth daily, Disp: , Rfl:     ferrous sulfate (IRON 325) 325 (65 Fe) MG tablet, Take 1 tablet by mouth 2 times daily, Disp: 60 tablet, Rfl: 5   Allergies:    Allergies   Allergen Reactions    Codeine     Lactose Intolerance (Gi)      Social History     Tobacco Use    Smoking status: Never Smoker    Smokeless tobacco: Never Used   Substance Use Topics    Alcohol use: No    Drug use: Never     Family History   Problem Relation Age of Onset    Colon Cancer Maternal Aunt     Cancer Paternal Uncle         melanoma     Cancer Maternal Grandmother         uterine/ovarian     Health Maintenance   Topic Date Due    Hepatitis C screen  Never done    Varicella vaccine (1 of 2 - 2-dose childhood series) Never done    COVID-19 Vaccine (1) Never done    Depression Screen  Never done    HIV screen  Never done    DTaP/Tdap/Td vaccine (1 - Tdap) Never done    Lipid screen  Never done    Flu vaccine (1) Never done    Diabetes screen  10/19/2024    Cervical cancer screen  10/07/2026    Hepatitis A vaccine  Aged Out    Hepatitis B vaccine  Aged Out    Hib vaccine  Aged Out    Meningococcal (ACWY) vaccine  Aged Out    Pneumococcal 0-64 years Vaccine  Aged Out     Subjective   Review of Systems   Constitutional: Negative for fatigue and fever. 32w 5d EGA - mathew arroyo contractions   HENT: Negative for dental problem, hearing loss, mouth sores, nosebleeds, sore throat and trouble swallowing. Eyes: Negative for discharge and itching. Respiratory: Negative for cough, shortness of breath and wheezing. Cardiovascular: Negative for chest pain, palpitations and leg swelling. Gastrointestinal: Negative for abdominal pain, constipation, diarrhea, nausea and vomiting. Endocrine: Negative for cold intolerance and heat intolerance. Genitourinary: Negative for dysuria, frequency, hematuria and urgency. Musculoskeletal: Negative for arthralgias, joint swelling and myalgias. Skin: Negative for pallor and rash. Allergic/Immunologic: Positive for environmental allergies. Negative for immunocompromised state. Neurological: Negative for seizures, syncope and numbness. Hematological: Negative for adenopathy. Does not bruise/bleed easily. Psychiatric/Behavioral: Negative for agitation, behavioral problems and confusion. Objective   Physical Exam  Vitals reviewed. Constitutional:       General: She is not in acute distress. Appearance: She is well-developed. She is not toxic-appearing or diaphoretic. Comments: Wearing a facial mask. Pregnant    HENT:      Head: Normocephalic and atraumatic.       Right Ear: External ear normal. Left Ear: External ear normal.      Nose: Nose normal.      Mouth/Throat:      Mouth: Mucous membranes are moist.   Eyes:      General: No scleral icterus. Right eye: No discharge. Left eye: No discharge. Conjunctiva/sclera: Conjunctivae normal.   Neck:      Trachea: No tracheal deviation. Cardiovascular:      Rate and Rhythm: Normal rate and regular rhythm. Pulmonary:      Effort: Pulmonary effort is normal. No respiratory distress. Breath sounds: Normal breath sounds. No wheezing or rales. Chest:   Breasts:      Right: No supraclavicular adenopathy. Left: No supraclavicular adenopathy. Abdominal:      General: Bowel sounds are normal. There is no distension. Palpations: Abdomen is soft. Tenderness: There is no abdominal tenderness. There is no guarding. Genitourinary:     Comments: Exam deferred  Musculoskeletal:         General: No tenderness or deformity. Cervical back: Neck supple. No muscular tenderness. Comments: Normal ROM all four extremities   Lymphadenopathy:      Cervical:      Right cervical: No superficial or deep cervical adenopathy. Left cervical: No superficial or deep cervical adenopathy. Upper Body:      Right upper body: No supraclavicular adenopathy. Left upper body: No supraclavicular adenopathy. Comments:      Skin:     General: Skin is warm and dry. Findings: No rash. Neurological:      Mental Status: She is alert and oriented to person, place, and time. Comments: follows commands, non-focal   Psychiatric:         Behavior: Behavior normal. Behavior is cooperative. Thought Content: Thought content normal.         Judgment: Judgment normal.      Comments: Alert and oriented to person, place and time.        /70   Pulse 73   Ht 5' 9\" (1.753 m)   Wt 196 lb 11.2 oz (89.2 kg)   LMP 07/01/2021 (Within Weeks)   SpO2 99%   BMI 29.05 kg/m²   Wt Readings from Last 3 Encounters:   02/15/22 196 lb 11.2 oz (89.2 kg)   02/08/22 197 lb (89.4 kg)   01/24/22 196 lb (88.9 kg)     Labs reviewed by me:  CBC 02/15/22:   Lab Results   Component Value Date    WBC 7.20 02/15/2022    HGB 10.5 (L) 02/15/2022    HCT 31.8 (L) 02/15/2022    MCV 91.6 02/15/2022     02/15/2022    LYMPHOPCT 20.3 01/18/2022    RBC 3.47 (L) 02/15/2022    MCH 30.3 02/15/2022    MCHC 33.0 02/15/2022    RDW 14.0 02/15/2022     ASSESSMENT/PLAN:    1. Iron deficiency anemia during pregnancy    2. 33 weeks gestation of pregnancy       Hgb 10.5, MCV 91.6 today. Platelets stable 983,110  Continue iron supplement with ferrous sulfate  Repeat CBC in 3 weeks, parenteral iron with Injectafer if needed. .    Plan of care was discussed with patient. All questions answered. Return in about 3 months (around 5/15/2022) for follow up with EDELMIRA Mayer. I have seen, examined and reviewed this patient medication list, appropriate labs and imaging studies. I reviewed relevant medical records and others physicians notes. I discussed the plan of care with the patient. I answered all questions to the patients satisfaction. I have also reviewed the chief complaint (CC) and part of the history (History of Present Illness (HPI), Past Family Social History [facility]), or Review of Systems (ROS) and made changes when appropriated. Dictated utilizing Dragon transcription software.         EDELMIRA Garcia  3:07 PM  2/15/2022

## 2022-02-15 ENCOUNTER — OFFICE VISIT (OUTPATIENT)
Dept: HEMATOLOGY | Age: 42
End: 2022-02-15
Payer: MEDICAID

## 2022-02-15 ENCOUNTER — HOSPITAL ENCOUNTER (OUTPATIENT)
Dept: INFUSION THERAPY | Age: 42
Discharge: HOME | End: 2022-02-15
Payer: MEDICAID

## 2022-02-15 VITALS
DIASTOLIC BLOOD PRESSURE: 70 MMHG | HEIGHT: 69 IN | WEIGHT: 196.7 LBS | BODY MASS INDEX: 29.13 KG/M2 | SYSTOLIC BLOOD PRESSURE: 136 MMHG | OXYGEN SATURATION: 99 % | HEART RATE: 73 BPM

## 2022-02-15 DIAGNOSIS — Z3A.33 33 WEEKS GESTATION OF PREGNANCY: ICD-10-CM

## 2022-02-15 DIAGNOSIS — R79.89 ABNORMAL COMPLETE BLOOD COUNT: ICD-10-CM

## 2022-02-15 DIAGNOSIS — O99.019 IRON DEFICIENCY ANEMIA DURING PREGNANCY: Primary | ICD-10-CM

## 2022-02-15 DIAGNOSIS — D50.9 IRON DEFICIENCY ANEMIA DURING PREGNANCY: Primary | ICD-10-CM

## 2022-02-15 LAB
HCT VFR BLD CALC: 31.8 % (ref 34.1–44.9)
HEMOGLOBIN: 10.5 G/DL (ref 11.2–15.7)
MCH RBC QN AUTO: 30.3 PG (ref 25.6–32.2)
MCHC RBC AUTO-ENTMCNC: 33 G/DL (ref 32.3–35.5)
MCV RBC AUTO: 91.6 FL (ref 79.4–94.8)
PDW BLD-RTO: 14 % (ref 11.7–14.4)
PLATELET # BLD: 241 K/UL (ref 182–369)
PMV BLD AUTO: 8.9 FL (ref 7.4–10.4)
RBC # BLD: 3.47 M/UL (ref 3.93–5.22)
WBC # BLD: 7.2 K/UL (ref 3.98–10.04)

## 2022-02-15 PROCEDURE — 99211 OFF/OP EST MAY X REQ PHY/QHP: CPT

## 2022-02-15 PROCEDURE — 85027 COMPLETE CBC AUTOMATED: CPT

## 2022-02-15 PROCEDURE — 99212 OFFICE O/P EST SF 10 MIN: CPT | Performed by: NURSE PRACTITIONER

## 2022-02-15 ASSESSMENT — ENCOUNTER SYMPTOMS
SHORTNESS OF BREATH: 0
ABDOMINAL PAIN: 0
NAUSEA: 0
CONSTIPATION: 0
EYE DISCHARGE: 0
COUGH: 0
DIARRHEA: 0
SORE THROAT: 0
WHEEZING: 0
TROUBLE SWALLOWING: 0
EYE ITCHING: 0
VOMITING: 0

## 2022-02-24 ENCOUNTER — ROUTINE PRENATAL (OUTPATIENT)
Dept: OBGYN CLINIC | Age: 42
End: 2022-02-24
Payer: MEDICAID

## 2022-02-24 VITALS
HEART RATE: 59 BPM | DIASTOLIC BLOOD PRESSURE: 70 MMHG | BODY MASS INDEX: 28.21 KG/M2 | WEIGHT: 191 LBS | SYSTOLIC BLOOD PRESSURE: 120 MMHG

## 2022-02-24 DIAGNOSIS — O24.410 DIET CONTROLLED GESTATIONAL DIABETES MELLITUS (GDM) IN THIRD TRIMESTER: ICD-10-CM

## 2022-02-24 DIAGNOSIS — Z3A.34 34 WEEKS GESTATION OF PREGNANCY: Primary | ICD-10-CM

## 2022-02-24 DIAGNOSIS — O09.522 AMA (ADVANCED MATERNAL AGE) MULTIGRAVIDA 35+, SECOND TRIMESTER: ICD-10-CM

## 2022-02-24 DIAGNOSIS — O09.93 SUPERVISION OF HIGH RISK PREGNANCY IN THIRD TRIMESTER: ICD-10-CM

## 2022-02-24 PROCEDURE — 99212 OFFICE O/P EST SF 10 MIN: CPT | Performed by: OBSTETRICS & GYNECOLOGY

## 2022-02-24 NOTE — PROGRESS NOTES
Pt is here for prenatal appointment. She denies spotting, cramping, contractions. Pt states she has been having the mathew arroyo. She says \"she just breaths through them. \" Pt states she is starting to become more \"uncomfortable\" often than usual. Pt states she has been eating, she has been trying to eat more healthier due to her Glucose.

## 2022-02-24 NOTE — PATIENT INSTRUCTIONS
Patient Education        Gestational Diabetes Diet: Care Instructions  Your Care Instructions     Gestational diabetes is a form of diabetes that can happen during pregnancy. It usually goes away after the baby is born. Diabetes means that your pancreas can't make enough insulin or your body does not use insulin properly. Insulin helps sugar enter your cells, where it is used for energy. You may be able to control your blood sugar while you are pregnant by eating a healthy diet and getting regular exercise. A dietitian or certified diabetes educator (CDE) can help you make a food plan. This plan will help control your blood sugar and provide good nutrition for you and your baby. If diet and exercise don't lower or control your blood sugar, you may need diabetes medicine or insulin. Follow-up care is a key part of your treatment and safety. Be sure to make and go to all appointments, and call your doctor if you are having problems. It's also a good idea to know your test results and keep a list of the medicines you take. How can you care for yourself at home? · Learn which foods have carbohydrate. Eating too much carbohydrate will cause your blood sugar to go too high. Carbohydrate foods include:  ? Breads, cereals, pasta, and rice. ? Dried beans and starchy vegetables, like corn, peas, and potatoes. ? Fruits and fruit juice, milk, and yogurt. ? Candy, table sugar, soda pop, and drinks sweetened with sugar. · Learn how much carbohydrate you need each day. A dietitian or certified diabetes educator (CDE) can teach you how to keep track of how much carbohydrate you eat. · Try to eat the same amount of carbohydrate at each meal. This will help keep your blood sugar steady. Do not save up your daily allowance of carbohydrate to eat at one meal.  · Limit foods that have added sugar. This includes candy, desserts, and soda pop.  These foods need to be counted as part of your total carbohydrate intake for the day.  · Do not drink alcohol. Alcohol is not safe for you or your baby. · Do not skip meals. Your blood sugar may drop too low if you skip meals and use insulin. · Write down what you eat every day. Review your record with your dietitian or CDE to see if you are eating the right amounts of foods. · Check your blood sugar first thing in the morning before you eat. Then check your blood sugar 1 to 2 hours after the first bite of each meal (or as your doctor recommends). This will help you see how the food you eat affects your blood sugar. Keep track of these levels. Share the record with your doctor. When should you call for help? Watch closely for changes in your health, and be sure to contact your doctor if:    · You have questions about your diet.     · You often have problems with high or low blood sugar. Where can you learn more? Go to https://Silvercare Solutionspecarleeewmonica.Bungolow. org and sign in to your [employer] account. Enter M291 in the [employer] box to learn more about \"Gestational Diabetes Diet: Care Instructions. \"     If you do not have an account, please click on the \"Sign Up Now\" link. Current as of: July 28, 2021               Content Version: 13.1  © 2006-2021 [employer]. Care instructions adapted under license by [facility] ([facility]). If you have questions about a medical condition or this instruction, always ask your healthcare professional. [place] [address] any warranty or liability for your use of this information. Patient Education        Weeks 34 to 39 of Your Pregnancy: Care Instructions  Overview     By now, your baby and your belly have grown quite large. It's almost time to give birth! Your baby's lungs are almost ready to breathe air. The skull bones are firm enough to protect your baby's head, but soft enough to move down through the birth canal.  You may be feeling excited and happy at times--but also anxious or scared.  You might wonder how you'll know if you're in labor or what to expect during labor. Try to be open and flexible in your expectations of the birth. Because each birth is different, there's no way to know exactly what childbirth will be like for you. Talk to your doctor or midwife about any concerns you have. If you haven't already had the Tdap shot during this pregnancy, talk to your doctor about getting it. It will help protect your  against pertussis infection. In the 36th week, you'll probably have a test for group B streptococcus (GBS). GBS is a common type of bacteria that can live in the vagina and rectum. It can make your baby sick after birth. If you test positive, you will get antibiotics during labor. The medicine will help keep your baby from getting the bacteria. Follow-up care is a key part of your treatment and safety. Be sure to make and go to all appointments, and call your doctor if you are having problems. It's also a good idea to know your test results and keep a list of the medicines you take. How can you care for yourself at home? Learn about pain relief choices  · Pain is different for everyone. Talk with your doctor about your feelings about pain. · You can choose from several types of pain relief. These include medicine, breathing techniques, and comfort measures. You can use more than one option. · If you choose to have pain medicine during labor, talk to your doctor about your options. Some medicines lower anxiety and help with some of the pain. Others make your lower body numb so that you won't feel pain. · Be sure to tell your doctor about your pain medicine choice before you start labor or very early in your labor. You may be able to change your mind as labor progresses. Labor and delivery  · The first stage of labor has three parts: early, active, and transition. ? It's common to have early labor at home.  You can stay busy or rest, eat light snacks, drink clear fluids, and start counting contractions. ? When talking during a contraction gets hard, you may be moving to active labor. During active labor, you should head for the hospital if you aren't there already. ? You are in active labor when contractions come every 3 to 4 minutes and last about 60 seconds. Your cervix is opening more rapidly. ? If your water breaks, contractions will come faster and stronger. ? During transition, your cervix is stretching, and contractions are coming more rapidly. ? You may want to push, but your cervix might not be ready. Your doctor will tell you when to push. · The second stage starts when your cervix is completely opened and you are ready to push. ? Contractions are very strong to push the baby down the birth canal.  ? You will probably feel the urge to push. You may feel like you need to have a bowel movement. ? You may be coached to push with contractions. These contractions will be very strong, but you won't have them as often. You can get a little rest between contractions. ? One last push, and your baby is born. · The third stage is when a few more contractions push out the placenta. This may take 30 minutes or less. Where can you learn more? Go to https://Rapt Mediapepiceweb.HiringBoss. org and sign in to your [employer] account. Enter R850 in the [employer] box to learn more about \"Weeks 34 to 36 of Your Pregnancy: Care Instructions. \"     If you do not have an account, please click on the \"Sign Up Now\" link. Current as of: June 16, 2021               Content Version: 13.1  © 2006-2021 [employer]wise[employer]orporated. Care instructions adapted under license by [facility] [address] If you have questions about a medical condition or this instruction, always ask your healthcare professional. [place] [address] any warranty or liability for your use of this information.

## 2022-02-24 NOTE — PROGRESS NOTES
Susi Hauser is a 39 y.o. female 34w0d who presents for routine prenatal visit. The patient was seen and evaluated. There was positive fetal movements. Occasional contractions, no bleeding or leakage of fluid. Signs and symptoms of  labor as well as labor were reviewed. The S/S of Pre-Eclampsia were reviewed with the patient in detail. She is to report any of these if they occur. She currently denies any of these. Pt states she is losing weight because she is trying to watch her blood sugar. Ulises Grossman is a 39 y.o. 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 Allergies: Codeine and Lactose intolerance (gi)  Past Medical History:   Diagnosis Date    Anemia     GBS carrier     Uterine fibroid      No past surgical history on file.   Family History   Problem Relation Age of Onset    Colon Cancer Maternal Aunt     Cancer Paternal Uncle         melanoma     Cancer Maternal Grandmother         uterine/ovarian     Social History     Tobacco Use    Smoking status: Never Smoker    Smokeless tobacco: Never Used   Substance Use Topics    Alcohol use: No         Mother's Prenatal Vitals  BP: 120/70  Weight: 191 lb (86.6 kg)  Pulse: 59  Patient Position: Sitting  Alb/Glu  Albumin: 1+  Glucose: Negative  Prenatal Fetal Information  Fetal Heart Rate: 142  Movement: Present  Physical Exam  Constitutional:       General: She is not in acute distress. Appearance: Normal appearance. She is not ill-appearing, toxic-appearing or diaphoretic. HENT:      Head: Normocephalic and atraumatic. Eyes:      Extraocular Movements: Extraocular movements intact. Pulmonary:      Effort: Pulmonary effort is normal. No respiratory distress. Abdominal:      Palpations: Abdomen is soft. Tenderness: There is no abdominal tenderness. Musculoskeletal:         General: No swelling or tenderness. Normal range of motion. Right lower leg: No edema. Left lower leg: No edema. Skin:     General: Skin is warm and dry. Findings: No rash. Neurological:      Mental Status: She is alert and oriented to person, place, and time. Psychiatric:         Attention and Perception: Attention and perception normal.         Mood and Affect: Mood and affect normal.         Speech: Speech normal.         Behavior: Behavior normal. Behavior is cooperative. Thought Content: Thought content normal.         Cognition and Memory: Cognition and memory normal.         Judgment: Judgment normal.           The patient had her 28 week labs completed. T-Dap Vaccine (27-36 weeks): completed    The patient was instructed on fetal kick counts and was given a kick sheet to complete every 8 hours. She was instructed that the baby should move at a minimum of ten times within one hour after a meal. The patient was instructed to lay down on her left side twenty minutes after eating and count movements for up to one hour with a target value of ten movements. She was instructed to notify the office if she did not make that target after two attempts or if after any attempt there was less than four movements. The patient reports that the targets have been made Yes. Assessment:   Diagnosis Orders   1. 34 weeks gestation of pregnancy     2.  AMA (advanced maternal age) multigravida 35+, second trimester     3. Diet controlled gestational diabetes mellitus (GDM) in third trimester  MyChart Glucose Flowsheet   4. Supervision of high risk pregnancy in third trimester               Plan:  1. PTL precautions   2. Return in 2 weeks  3. Continue monitoring blood sugars   4. NST today  I Zena Foster, am scribing for and in the presence of Dr. Wilma Delgado. I, Dr. Wilma Delgado, personally performed the services described in this documentation as scribed by Zena Foster in my presence, and it is both accurate and complete.

## 2022-03-03 ENCOUNTER — ROUTINE PRENATAL (OUTPATIENT)
Dept: OBGYN CLINIC | Age: 42
End: 2022-03-03
Payer: MEDICAID

## 2022-03-03 VITALS
SYSTOLIC BLOOD PRESSURE: 124 MMHG | HEART RATE: 66 BPM | BODY MASS INDEX: 28.8 KG/M2 | DIASTOLIC BLOOD PRESSURE: 80 MMHG | WEIGHT: 195 LBS

## 2022-03-03 DIAGNOSIS — O09.93 SUPERVISION OF HIGH RISK PREGNANCY IN THIRD TRIMESTER: ICD-10-CM

## 2022-03-03 DIAGNOSIS — O09.522 AMA (ADVANCED MATERNAL AGE) MULTIGRAVIDA 35+, SECOND TRIMESTER: ICD-10-CM

## 2022-03-03 DIAGNOSIS — Z3A.35 35 WEEKS GESTATION OF PREGNANCY: Primary | ICD-10-CM

## 2022-03-03 DIAGNOSIS — O24.410 DIET CONTROLLED GESTATIONAL DIABETES MELLITUS (GDM) IN THIRD TRIMESTER: ICD-10-CM

## 2022-03-03 PROCEDURE — 59025 FETAL NON-STRESS TEST: CPT | Performed by: OBSTETRICS & GYNECOLOGY

## 2022-03-03 PROCEDURE — 99213 OFFICE O/P EST LOW 20 MIN: CPT | Performed by: OBSTETRICS & GYNECOLOGY

## 2022-03-03 NOTE — PROGRESS NOTES
Lacey Reyes is a 39 y.o. female 35w0d who presents for routine prenatal visit. The patient was seen and evaluated. There was positive fetal movements. No contractions, bleeding or leakage of fluid. Signs and symptoms of  labor as well as labor were reviewed. The S/S of Pre-Eclampsia were reviewed with the patient in detail. She is to report any of these if they occur. She currently denies any of these. Pt states she is monitoring her blood sugars. She has not been able to input them into her my chart. Ceci Francis is a 39 y.o. female with the following history as recorded in [facility]:  Patient Active Problem List    Diagnosis Date Noted    Uterine fibroids affecting pregnancy in second trimester 10/08/2021    High risk pregnancy, antepartum 10/08/2021    History of gestational diabetes 10/08/2021    AMA (advanced maternal age) multigravida 35+, second trimester 10/08/2021     Current Outpatient Medications   Medication Sig Dispense Refill    Prenatal Vit-Fe Fumarate-FA (PRENATAL VITAMIN PO) Take 1 tablet by mouth daily      ferrous sulfate (IRON 325) 325 (65 Fe) MG tablet Take 1 tablet by mouth 2 times daily 60 tablet 5     No current facility-administered medications for this visit. Allergies: Codeine and Lactose intolerance (gi)  Past Medical History:   Diagnosis Date    Anemia     Uterine fibroid      No past surgical history on file. Family History   Problem Relation Age of Onset    Colon Cancer Maternal Aunt     Cancer Paternal Uncle         melanoma     Cancer Maternal Grandmother         uterine/ovarian     Social History     Tobacco Use    Smoking status: Never Smoker    Smokeless tobacco: Never Used   Substance Use Topics    Alcohol use: No            Physical Exam  Constitutional:       General: She is not in acute distress. Appearance: Normal appearance. She is not ill-appearing, toxic-appearing or diaphoretic.    HENT:      Head: Normocephalic and atraumatic. Eyes:      Extraocular Movements: Extraocular movements intact. Pulmonary:      Effort: Pulmonary effort is normal. No respiratory distress. Abdominal:      Palpations: Abdomen is soft. Tenderness: There is no abdominal tenderness. Musculoskeletal:         General: No swelling or tenderness. Normal range of motion. Right lower leg: No edema. Left lower leg: No edema. Skin:     General: Skin is warm and dry. Findings: No rash. Neurological:      Mental Status: She is alert and oriented to person, place, and time. Psychiatric:         Attention and Perception: Attention and perception normal.         Mood and Affect: Mood and affect normal.         Speech: Speech normal.         Behavior: Behavior normal. Behavior is cooperative. Thought Content: Thought content normal.         Cognition and Memory: Cognition and memory normal.         Judgment: Judgment normal.           The patient had her 28 week labs completed. T-Dap Vaccine (27-36 weeks): awaiting    The patient was instructed on fetal kick counts and was given a kick sheet to complete every 8 hours. She was instructed that the baby should move at a minimum of ten times within one hour after a meal. The patient was instructed to lay down on her left side twenty minutes after eating and count movements for up to one hour with a target value of ten movements. She was instructed to notify the office if she did not make that target after two attempts or if after any attempt there was less than four movements. The patient reports that the targets have been made Yes. Assessment:   Diagnosis Orders   1. 35 weeks gestation of pregnancy     2. AMA (advanced maternal age) multigravida 33+, second trimester     3. Diet controlled gestational diabetes mellitus (GDM) in third trimester     4. Supervision of high risk pregnancy in third trimester               Plan:  1. PTL precautions   2. Return in 1 week  3. NST today - reactive with good variability  4. Blood sugars reviewed - told pt she does not need any medication. Ruth Cummings, am scribing for and in the presence of Dr. Rowdy Mcclure. I, Dr. Rowdy Mcclure, personally performed the services described in this documentation as scribed by Soledad Ahumada in my presence, and it is both accurate and complete.

## 2022-03-03 NOTE — PROGRESS NOTES
Pt is here for prenatal appointment. She denies spotting, cramping, contractions. Pt is having contractions.

## 2022-03-07 DIAGNOSIS — D50.9 IRON DEFICIENCY ANEMIA DURING PREGNANCY: Primary | ICD-10-CM

## 2022-03-07 DIAGNOSIS — O99.019 IRON DEFICIENCY ANEMIA DURING PREGNANCY: Primary | ICD-10-CM

## 2022-03-08 ENCOUNTER — APPOINTMENT (OUTPATIENT)
Dept: INFUSION THERAPY | Age: 42
End: 2022-03-08
Payer: MEDICAID

## 2022-03-09 ENCOUNTER — HOSPITAL ENCOUNTER (OUTPATIENT)
Dept: INFUSION THERAPY | Age: 42
Discharge: HOME | End: 2022-03-09
Payer: MEDICAID

## 2022-03-09 ENCOUNTER — ROUTINE PRENATAL (OUTPATIENT)
Dept: OBGYN CLINIC | Age: 42
End: 2022-03-09
Payer: MEDICAID

## 2022-03-09 VITALS
WEIGHT: 195 LBS | BODY MASS INDEX: 28.8 KG/M2 | HEART RATE: 66 BPM | DIASTOLIC BLOOD PRESSURE: 77 MMHG | SYSTOLIC BLOOD PRESSURE: 120 MMHG

## 2022-03-09 DIAGNOSIS — D50.9 IRON DEFICIENCY ANEMIA DURING PREGNANCY: ICD-10-CM

## 2022-03-09 DIAGNOSIS — O09.93 SUPERVISION OF HIGH RISK PREGNANCY IN THIRD TRIMESTER: ICD-10-CM

## 2022-03-09 DIAGNOSIS — O34.13 UTERINE FIBROIDS AFFECTING PREGNANCY IN THIRD TRIMESTER: ICD-10-CM

## 2022-03-09 DIAGNOSIS — O99.019 IRON DEFICIENCY ANEMIA DURING PREGNANCY: ICD-10-CM

## 2022-03-09 DIAGNOSIS — Z3A.35 35 WEEKS GESTATION OF PREGNANCY: ICD-10-CM

## 2022-03-09 DIAGNOSIS — D25.9 UTERINE FIBROIDS AFFECTING PREGNANCY IN THIRD TRIMESTER: ICD-10-CM

## 2022-03-09 DIAGNOSIS — O24.410 DIET CONTROLLED GESTATIONAL DIABETES MELLITUS (GDM) IN THIRD TRIMESTER: ICD-10-CM

## 2022-03-09 DIAGNOSIS — O09.523 AMA (ADVANCED MATERNAL AGE) MULTIGRAVIDA 35+, THIRD TRIMESTER: Primary | ICD-10-CM

## 2022-03-09 LAB
HCT VFR BLD CALC: 32.6 % (ref 34.1–44.9)
HEMOGLOBIN: 10.7 G/DL (ref 11.2–15.7)
MCH RBC QN AUTO: 30.3 PG (ref 25.6–32.2)
MCHC RBC AUTO-ENTMCNC: 32.8 G/DL (ref 32.3–35.5)
MCV RBC AUTO: 92.4 FL (ref 79.4–94.8)
PDW BLD-RTO: 13.9 % (ref 11.7–14.4)
PLATELET # BLD: 174 K/UL (ref 182–369)
PMV BLD AUTO: 10.1 FL (ref 7.4–10.4)
RBC # BLD: 3.53 M/UL (ref 3.93–5.22)
WBC # BLD: 7.5 K/UL (ref 3.98–10.04)

## 2022-03-09 PROCEDURE — 85027 COMPLETE CBC AUTOMATED: CPT

## 2022-03-09 PROCEDURE — 99213 OFFICE O/P EST LOW 20 MIN: CPT | Performed by: NURSE PRACTITIONER

## 2022-03-09 PROCEDURE — 36415 COLL VENOUS BLD VENIPUNCTURE: CPT

## 2022-03-09 NOTE — PATIENT INSTRUCTIONS
Patient Education        Weeks 34 to 39 of Your Pregnancy: Care Instructions  Overview     By now, your baby and your belly have grown quite large. It's almost time to give birth! Your baby's lungs are almost ready to breathe air. The skull bones are firm enough to protect your baby's head, but soft enough to move down through the birth canal.  You may be feeling excited and happy at times--but also anxious or scared. You might wonder how you'll know if you're in labor or what to expect during labor. Try to be open and flexible in your expectations of the birth. Because each birth is different, there's no way to know exactly what childbirth will be like for you. Talk to your doctor or midwife about any concerns you have. If you haven't already had the Tdap shot during this pregnancy, talk to your doctor about getting it. It will help protect your  against pertussis infection. In the 36th week, you'll probably have a test for group B streptococcus (GBS). GBS is a common type of bacteria that can live in the vagina and rectum. It can make your baby sick after birth. If you test positive, you will get antibiotics during labor. The medicine will help keep your baby from getting the bacteria. Follow-up care is a key part of your treatment and safety. Be sure to make and go to all appointments, and call your doctor if you are having problems. It's also a good idea to know your test results and keep a list of the medicines you take. How can you care for yourself at home? Learn about pain relief choices  · Pain is different for everyone. Talk with your doctor about your feelings about pain. · You can choose from several types of pain relief. These include medicine, breathing techniques, and comfort measures. You can use more than one option. · If you choose to have pain medicine during labor, talk to your doctor about your options. Some medicines lower anxiety and help with some of the pain.  Others make your lower body numb so that you won't feel pain. · Be sure to tell your doctor about your pain medicine choice before you start labor or very early in your labor. You may be able to change your mind as labor progresses. Labor and delivery  · The first stage of labor has three parts: early, active, and transition. ? It's common to have early labor at home. You can stay busy or rest, eat light snacks, drink clear fluids, and start counting contractions. ? When talking during a contraction gets hard, you may be moving to active labor. During active labor, you should head for the hospital if you aren't there already. ? You are in active labor when contractions come every 3 to 4 minutes and last about 60 seconds. Your cervix is opening more rapidly. ? If your water breaks, contractions will come faster and stronger. ? During transition, your cervix is stretching, and contractions are coming more rapidly. ? You may want to push, but your cervix might not be ready. Your doctor will tell you when to push. · The second stage starts when your cervix is completely opened and you are ready to push. ? Contractions are very strong to push the baby down the birth canal.  ? You will probably feel the urge to push. You may feel like you need to have a bowel movement. ? You may be coached to push with contractions. These contractions will be very strong, but you won't have them as often. You can get a little rest between contractions. ? One last push, and your baby is born. · The third stage is when a few more contractions push out the placenta. This may take 30 minutes or less. Where can you learn more? Go to https://homero.[employer]. org and sign in to your Take[employer] account. Enter R434 in the [employer] box to learn more about \"Weeks 34 to 36 of Your Pregnancy: Care Instructions. \"     If you do not have an account, please click on the \"Sign Up Now\" link.   Current as of: June 16, 2021               Content Version: 13.1  © 0314-8997 Healthwise, Incorporated. Care instructions adapted under license by [facility] ([facility]). If you have questions about a medical condition or this instruction, always ask your healthcare professional. [address] any warranty or liability for your use of this information.

## 2022-03-09 NOTE — PROGRESS NOTES
Patient presents today for routine prenatal visit. Pt denies any vaginal leaking bleeding or contractions. + Fetal movement. S:Eunice Lenz is here for a return obstetrical visit. Today she is 35w6d weeks EGA. She is doing well and has no complaints. Had bpp today at [facility], . Cbc today is ok, no need for iv iron infusion. She  does not have vaginal bleeding, leaking of fluid, contractions. She does not have blurred vision, SOB, or increased swelling in legs or face. Pt does feel fetal movement regularly. O:   GBS collected  Vitals:    22 1058   BP: 120/77   Pulse: 66   Weight: 195 lb (88.5 kg)     Pt is A&Ox3, in no acute distress. Normocephalic, atraumatic. PERRL. Resp even and non-labored. Skin pink, warm & dry. Gravid abdomen. GALVAN's well. Gait steady. See OB flowsheet. A: Normal IUP at 35w6d wks      Diagnosis Orders   1. AMA (advanced maternal age) multigravida 35+, third trimester  Culture, Strep B Screen, Vaginal/Rectal   2. 35 weeks gestation of pregnancy     3. Diet controlled gestational diabetes mellitus (GDM) in third trimester     4. Supervision of high risk pregnancy in third trimester     5. Uterine fibroids affecting pregnancy in third trimester         P:   Pt counseled on PIH precautions, Kick count and  labor  Continue with routine prenatal care. RTC in 1 wk for prenatal visit    MEDICATIONS:  No orders of the defined types were placed in this encounter.       ORDERS:  Orders Placed This Encounter   Procedures    Culture, Strep B Screen, Vaginal/Rectal

## 2022-03-11 PROBLEM — Z22.330 GBS CARRIER: Status: ACTIVE | Noted: 2022-03-11

## 2022-03-11 LAB — STREP B DNA AMP: DETECTED

## 2022-03-17 ENCOUNTER — ROUTINE PRENATAL (OUTPATIENT)
Dept: OBGYN CLINIC | Age: 42
End: 2022-03-17
Payer: MEDICAID

## 2022-03-17 VITALS
SYSTOLIC BLOOD PRESSURE: 133 MMHG | WEIGHT: 194 LBS | HEART RATE: 74 BPM | DIASTOLIC BLOOD PRESSURE: 86 MMHG | BODY MASS INDEX: 28.65 KG/M2

## 2022-03-17 DIAGNOSIS — Z3A.37 37 WEEKS GESTATION OF PREGNANCY: Primary | ICD-10-CM

## 2022-03-17 DIAGNOSIS — O09.523 AMA (ADVANCED MATERNAL AGE) MULTIGRAVIDA 35+, THIRD TRIMESTER: ICD-10-CM

## 2022-03-17 DIAGNOSIS — O24.410 DIET CONTROLLED GESTATIONAL DIABETES MELLITUS (GDM) IN THIRD TRIMESTER: ICD-10-CM

## 2022-03-17 PROCEDURE — 59025 FETAL NON-STRESS TEST: CPT | Performed by: OBSTETRICS & GYNECOLOGY

## 2022-03-17 PROCEDURE — 99213 OFFICE O/P EST LOW 20 MIN: CPT | Performed by: OBSTETRICS & GYNECOLOGY

## 2022-03-17 NOTE — PATIENT INSTRUCTIONS
Patient Education        Week 45 of Your Pregnancy: Care Instructions  Overview     Believe it or not, your baby is almost here. You may have ideas about your baby's personality because of how much your baby moves. Or you may have noticed how your baby responds to sounds, warmth, cold, and light. You may even know what kind of music your baby likes. By now, you have a better idea of what to expect during delivery. You may have talked about your birth preferences with your doctor. But even if you want a vaginal birth, it's a good idea to learn about  births.  birth means that your baby is born through a cut (incision) in your lower belly. In some cases it may be the best choice for the health of you and your baby. Follow-up care is a key part of your treatment and safety. Be sure to make and go to all appointments, and call your doctor if you are having problems. It's also a good idea to know your test results and keep a list of the medicines you take. How can you care for yourself at home? Learn about  birth  · Most C-sections are unplanned. They are done because of problems that occur during labor. These problems might include:  ? Labor that slows or stops. ? High blood pressure or other problems for you.  ? Signs of distress in your baby. These signs may include a very fast or slow heart rate. · Although you and your baby are likely do well after a , it is major surgery. It has more risks than a vaginal delivery. · In some cases, a planned  may be safer than a vaginal delivery. This may be the case if:  ? You have a health problem, such as a heart condition. ? Your baby isn't in a head-down position for delivery. This is called a breech position. ? The uterus has scars from past surgeries. This could increase the chance of a tear in the uterus. ? There is a problem with the placenta.  ?  You have an infection, such as genital herpes, that could be spread to your baby.  ? Corinda Repress are having twins or more. ? Your baby weighs 9 to 10 pounds or more. · Because of the risks of a , planned C-sections generally should be done only for medical reasons. And a planned  should be done at 39 weeks or later unless there is a medical reason to do it sooner. Know what to expect after delivery, and plan for the first few weeks at home  · You, your baby, and your partner or  will get identification bands. Only people with matching bands can  the baby from the nursery. · You will learn how to feed, diaper, and bathe your baby. And you will learn how to care for the umbilical cord stump. If your baby will be circumcised, you will also learn how to care for that. · Ask people to wait to visit you until you are at home. And ask them to wash their hands before they touch your baby. · Make sure you have another adult in your home for at least 2 or 3 days after the birth. · During the first 2 weeks, limit when friends and family can visit. · Do not allow visitors who have colds or infections. Make sure all visitors are up to date with their vaccinations. Never let anyone smoke around your baby. · Try to nap when the baby naps. Be aware of postpartum depression  · \"Baby blues\" are common for the first 1 to 2 weeks after birth. You may cry or feel sad or irritable for no reason. · Sometimes these feelings last longer and are more intense. This is called postpartum depression. · If your symptoms last for more than a few weeks or you feel very depressed, ask your doctor for help. · Postpartum depression can be treated. Support groups and counseling can help. Sometimes medicine can also help. Where can you learn more? Go to https://Exhibiaherman.[employer]. org and sign in to your [employer] account. Enter B044 in the [employer] box to learn more about \"Week 38 of Your Pregnancy: Care Instructions. \"     If you do not have an account, please click on the \"Sign Up Now\" link. Current as of: June 16, 2021               Content Version: 13.1  © 2006-2021 Healthwise, Incorporated. Care instructions adapted under license by [facility] ([facility]). If you have questions about a medical condition or this instruction, always ask your healthcare professional. [place] [address] any warranty or liability for your use of this information.

## 2022-03-17 NOTE — PROGRESS NOTES
Lewis Krishnamurthy is a 39 y.o. female 37w0d who presents for routine prenatal visit. The patient was seen and evaluated. There was positive fetal movements. No contractions, bleeding or leakage of fluid. Signs and symptoms of  labor as well as labor were reviewed. The S/S of Pre-Eclampsia were reviewed with the patient in detail. She is to report any of these if they occur. She currently denies any of these. Pt continues to monitor blood sugars. Timmy Mcdonough is a 39 y.o. female with the following history as recorded in [facility]:  Patient Active Problem List    Diagnosis Date Noted     (normal spontaneous vaginal delivery)     Encounter for elective induction of labor 2022    GBS carrier 2022    Uterine fibroids affecting pregnancy in second trimester 10/08/2021    High risk pregnancy, antepartum 10/08/2021    History of gestational diabetes 10/08/2021    AMA (advanced maternal age) multigravida 33+, second trimester 10/08/2021     Current Outpatient Medications   Medication Sig Dispense Refill    Prenatal Vit-Fe Fumarate-FA (PRENATAL VITAMIN PO) Take 1 tablet by mouth daily      ferrous sulfate (IRON 325) 325 (65 Fe) MG tablet Take 1 tablet by mouth 2 times daily 60 tablet 5    sertraline (ZOLOFT) 25 MG tablet Take 1 tablet by mouth daily 30 tablet 3    ibuprofen (ADVIL;MOTRIN) 800 MG tablet Take 1 tablet by mouth every 6 hours as needed for Pain (Patient not taking: Reported on 2022) 90 tablet 0     No current facility-administered medications for this visit. Allergies: Codeine and Lactose intolerance (gi)  Past Medical History:   Diagnosis Date    Anemia     GBS carrier     Uterine fibroid      No past surgical history on file.   Family History   Problem Relation Age of Onset    Colon Cancer Maternal Aunt     Cancer Paternal Uncle         melanoma     Cancer Maternal Grandmother         uterine/ovarian     Social History     Tobacco Use    Smoking status: Never Smoker    Smokeless tobacco: Never Used   Substance Use Topics    Alcohol use: No         Mother's Prenatal Vitals  BP: 133/86  Weight: 194 lb (88 kg)  Pulse: 74  Patient Position: Sitting  Alb/Glu  Albumin: Negative  Glucose: Negative  Prenatal Fetal Information  Fundal Height (cm): 38 cm  Fetal Heart Rate: NST-136  Movement: Present  Dil/Eff/Sta  Dilation (cm): 2  Effacement: 50  Station: Ballotable  Physical Exam  Constitutional:       General: She is not in acute distress. Appearance: Normal appearance. She is not ill-appearing, toxic-appearing or diaphoretic. HENT:      Head: Normocephalic and atraumatic. Eyes:      Extraocular Movements: Extraocular movements intact. Pulmonary:      Effort: Pulmonary effort is normal. No respiratory distress. Abdominal:      Palpations: Abdomen is soft. Tenderness: There is no abdominal tenderness. Musculoskeletal:         General: No swelling or tenderness. Normal range of motion. Right lower leg: No edema. Left lower leg: No edema. Skin:     General: Skin is warm and dry. Findings: No rash. Neurological:      Mental Status: She is alert and oriented to person, place, and time. Psychiatric:         Attention and Perception: Attention and perception normal.         Mood and Affect: Mood and affect normal.         Speech: Speech normal.         Behavior: Behavior normal. Behavior is cooperative. Thought Content: Thought content normal.         Cognition and Memory: Cognition and memory normal.         Judgment: Judgment normal.           The patient had her 28 week labs completed. T-Dap Vaccine (27-36 weeks): completed    The patient was instructed on fetal kick counts and was given a kick sheet to complete every 8 hours.  She was instructed that the baby should move at a minimum of ten times within one hour after a meal. The patient was instructed to lay down on her left side twenty minutes after eating and count movements for up to one hour with a target value of ten movements. She was instructed to notify the office if she did not make that target after two attempts or if after any attempt there was less than four movements. The patient reports that the targets have been made Yes. Assessment:   Diagnosis Orders   1. 37 weeks gestation of pregnancy     2. AMA (advanced maternal age) multigravida 33+, third trimester  0 - NM FETAL NON-STRESS TEST   3. Diet controlled gestational diabetes mellitus (GDM) in third trimester  30904 - NM FETAL NON-STRESS TEST             Plan:  1. Labor precautions   2. Return on Monday for NST and check blood sugars. 3. NST - reactive Cat 1 FHT  4. Discussed blood sugar readings, had some elevated fasting sugars. Tyson Durán, am scribing for and in the presence of Dr. Sandra Moseley. I, Dr. Sandra Moseley, personally performed the services described in this documentation as scribed by Nery Porter in my presence, and it is both accurate and complete.

## 2022-03-17 NOTE — PROGRESS NOTES
Pt is here for prenatal appointment. She denies spotting. She is still having some contractions and cramping.

## 2022-03-21 ENCOUNTER — ROUTINE PRENATAL (OUTPATIENT)
Dept: OBGYN CLINIC | Age: 42
End: 2022-03-21
Payer: MEDICAID

## 2022-03-21 VITALS
WEIGHT: 196 LBS | SYSTOLIC BLOOD PRESSURE: 131 MMHG | BODY MASS INDEX: 28.94 KG/M2 | DIASTOLIC BLOOD PRESSURE: 81 MMHG | HEART RATE: 67 BPM

## 2022-03-21 DIAGNOSIS — Z3A.37 37 WEEKS GESTATION OF PREGNANCY: Primary | ICD-10-CM

## 2022-03-21 DIAGNOSIS — O09.93 SUPERVISION OF HIGH RISK PREGNANCY IN THIRD TRIMESTER: ICD-10-CM

## 2022-03-21 DIAGNOSIS — O24.410 DIET CONTROLLED GESTATIONAL DIABETES MELLITUS (GDM) IN THIRD TRIMESTER: ICD-10-CM

## 2022-03-21 DIAGNOSIS — O09.523 AMA (ADVANCED MATERNAL AGE) MULTIGRAVIDA 35+, THIRD TRIMESTER: ICD-10-CM

## 2022-03-21 PROCEDURE — 59025 FETAL NON-STRESS TEST: CPT | Performed by: OBSTETRICS & GYNECOLOGY

## 2022-03-21 PROCEDURE — 99213 OFFICE O/P EST LOW 20 MIN: CPT | Performed by: OBSTETRICS & GYNECOLOGY

## 2022-03-21 NOTE — PATIENT INSTRUCTIONS
Patient Education        Week 40 of Your Pregnancy: Care Instructions  Overview     You are near the end of your pregnancyand you're probably pretty uncomfortable. It may be harder to walk around. Lying down probably isn't comfortable either. You may have trouble getting to sleep or staying asleep. Most babies are born between 40 and 41 weeks. This is a good time to think about packing a bag for the hospital with items you'll need. Then you'll be ready when labor starts. Follow-up care is a key part of your treatment and safety. Be sure to make and go to all appointments, and call your doctor if you are having problems. It's also a good idea to know your test results and keep a list of the medicines you take. How can you care for yourself at home? Learn about breastfeeding  · Breastfeeding is best for your baby and good for you. · Breast milk has antibodies to help your baby fight infections. · If you breastfeed, you may lose weight faster. That's because making milk burns calories. · Learning the best ways to hold your baby will make breastfeeding easier. · Sometimes breastfeeding can make partners feel left out. If you have a partner, plan how you can care for your baby together. For example, your partner can bathe and diaper the baby. You can snuggle together when you breastfeed. · You may want to learn how to use a breast pump and store your milk. · If you choose to bottle feed, make the feeding feel like breastfeeding so you can bond with your baby. Always hold your baby and the bottle. Don't prop bottles or let your baby fall asleep with a bottle. Learn about crying  · It's common for babies to cry for 1 to 3 hours a day. Some cry more, and some cry less. · Babies don't cry to make you upset or because you're a bad parent. · Crying is how your baby communicates. Your baby may be hungry; have gas; need a diaper change; or feel cold, warm, tired, lonely, or tense.  Sometimes babies cry for unknown reasons. · If you respond to your baby's needs, your baby will learn to trust you. · Try to stay calm when your baby cries. Your baby may get more upset if they sense that you are upset. Know how to care for your   · Your baby's umbilical cord stump will drop off on its own, usually between 1 and 2 weeks. To care for your baby's umbilical cord area:  ? Clean the area at the bottom of the cord 2 or 3 times a day. ? Pay special attention to the area where the cord attaches to the skin. ? Keep the diaper folded below the cord. ? Use a damp washcloth or cotton ball to sponge bathe your baby until the stump has come off. · Your baby's first dark stool is called meconium. After the meconium is passed, your baby will develop their own bowel pattern. ? Some babies, especially  babies, have several bowel movements a day. Others have one or two a day, or one every 2 to 3 days. ?  babies often have loose, yellow stools. Formula-fed babies have more formed stools. ? If your baby's stools look like little pellets, your baby is constipated. After 2 days of constipation, call your baby's doctor. · If your baby will be circumcised, you can care for your baby at home. ? Gently rinse your baby's penis with warm water after every diaper change. Don't try to remove the film that forms on the penis. This film will go away on its own. Pat dry. ? Put petroleum ointment, such as Vaseline, on the area of the diaper that will touch your baby's penis. This will keep the diaper from sticking to your baby. ? Ask the doctor about giving your baby acetaminophen (Tylenol) for pain. Where can you learn more? Go to https://blabfeedpecarleeewmonica.[employer]. org and sign in to your [employer] account. Enter   97 in the [employer] box to learn more about \"Week 37 of Your Pregnancy: Care Instructions. \"     If you do not have an account, please click on the \"Sign Up Now\" link.   Current as of:  2021               Content Version: 13.1  © 4844-1291 Healthwise, Incorporated. Care instructions adapted under license by [facility] ([facility]). If you have questions about a medical condition or this instruction, always ask your healthcare professional. [address] any warranty or liability for your use of this information.

## 2022-03-21 NOTE — PROGRESS NOTES
Alonso Coronel is a 39 y.o. female 37w4d who presents for routine prenatal visit. The patient was seen and evaluated. There was positive fetal movements. No contractions, bleeding or leakage of fluid. Signs and symptoms of  labor as well as labor were reviewed. The S/S of Pre-Eclampsia were reviewed with the patient in detail. She is to report any of these if they occur. She currently denies any of these. Tricia Nash is a 39 y.o. female with the following history as recorded in [facility]:  Patient Active Problem List    Diagnosis Date Noted     (normal spontaneous vaginal delivery)     Encounter for elective induction of labor 2022    GBS carrier 2022    Uterine fibroids affecting pregnancy in second trimester 10/08/2021    High risk pregnancy, antepartum 10/08/2021    History of gestational diabetes 10/08/2021    AMA (advanced maternal age) multigravida 35+, second trimester 10/08/2021     Current Outpatient Medications   Medication Sig Dispense Refill    sertraline (ZOLOFT) 25 MG tablet Take 1 tablet by mouth daily 30 tablet 3    ibuprofen (ADVIL;MOTRIN) 800 MG tablet Take 1 tablet by mouth every 6 hours as needed for Pain (Patient not taking: Reported on 2022) 90 tablet 0    Prenatal Vit-Fe Fumarate-FA (PRENATAL VITAMIN PO) Take 1 tablet by mouth daily      ferrous sulfate (IRON 325) 325 (65 Fe) MG tablet Take 1 tablet by mouth 2 times daily 60 tablet 5     No current facility-administered medications for this visit. Allergies: Codeine and Lactose intolerance (gi)  Past Medical History:   Diagnosis Date    Anemia     GBS carrier     Uterine fibroid      History reviewed. No pertinent surgical history.   Family History   Problem Relation Age of Onset    Colon Cancer Maternal Aunt     Cancer Paternal Uncle         melanoma     Cancer Maternal Grandmother         uterine/ovarian     Social History     Tobacco Use    Smoking status: Never Smoker  Smokeless tobacco: Never Used   Substance Use Topics    Alcohol use: No         Mother's Prenatal Vitals  BP: 131/81  Weight: 196 lb (88.9 kg)  Pulse: 67  Patient Position: Sitting  Prenatal Fetal Information  Fetal Heart Rate:   Movement: Present  Physical Exam  Constitutional:       General: She is not in acute distress. Appearance: Normal appearance. She is not ill-appearing, toxic-appearing or diaphoretic. HENT:      Head: Normocephalic and atraumatic. Eyes:      Extraocular Movements: Extraocular movements intact. Pulmonary:      Effort: Pulmonary effort is normal. No respiratory distress. Abdominal:      Palpations: Abdomen is soft. Tenderness: There is no abdominal tenderness. Musculoskeletal:         General: No swelling or tenderness. Normal range of motion. Right lower leg: No edema. Left lower leg: No edema. Skin:     General: Skin is warm and dry. Findings: No rash. Neurological:      Mental Status: She is alert and oriented to person, place, and time. Psychiatric:         Attention and Perception: Attention and perception normal.         Mood and Affect: Mood and affect normal.         Speech: Speech normal.         Behavior: Behavior normal. Behavior is cooperative. Thought Content: Thought content normal.         Cognition and Memory: Cognition and memory normal.         Judgment: Judgment normal.           The patient had her 28 week labs completed. T-Dap Vaccine (27-36 weeks): completed    The patient was instructed on fetal kick counts and was given a kick sheet to complete every 8 hours. She was instructed that the baby should move at a minimum of ten times within one hour after a meal. The patient was instructed to lay down on her left side twenty minutes after eating and count movements for up to one hour with a target value of ten movements.   She was instructed to notify the office if she did not make that target after two attempts or if after any attempt there was less than four movements. The patient reports that the targets have been made Yes. Assessment:   Diagnosis Orders   1. 37 weeks gestation of pregnancy     2. AMA (advanced maternal age) multigravida 33+, third trimester  0 - MS FETAL NON-STRESS TEST   3. Diet controlled gestational diabetes mellitus (GDM) in third trimester  03058 - MS FETAL NON-STRESS TEST   4. Supervision of high risk pregnancy in third trimester  36639 - MS FETAL NON-STRESS TEST             Plan:  1. Labor precautions   2. Return in 1 weeks  3. NST - reactive, cat 1 FHT  4. Induction scheduled for 3-27-22, arrive at [address] [facility],  scribing for and in the presence of Dr. Grazyna Polanco. I, Dr. Grazyna Polanco, personally performed the services described in this documentation as scribed by Mc Navarro in my presence, and it is both accurate and complete.

## 2022-03-24 ENCOUNTER — ROUTINE PRENATAL (OUTPATIENT)
Dept: OBGYN CLINIC | Age: 42
End: 2022-03-24
Payer: MEDICAID

## 2022-03-24 VITALS
DIASTOLIC BLOOD PRESSURE: 76 MMHG | BODY MASS INDEX: 28.94 KG/M2 | WEIGHT: 196 LBS | HEART RATE: 65 BPM | SYSTOLIC BLOOD PRESSURE: 124 MMHG

## 2022-03-24 DIAGNOSIS — O09.93 SUPERVISION OF HIGH RISK PREGNANCY IN THIRD TRIMESTER: ICD-10-CM

## 2022-03-24 DIAGNOSIS — O24.410 DIET CONTROLLED GESTATIONAL DIABETES MELLITUS (GDM) IN THIRD TRIMESTER: ICD-10-CM

## 2022-03-24 DIAGNOSIS — Z3A.38 38 WEEKS GESTATION OF PREGNANCY: ICD-10-CM

## 2022-03-24 DIAGNOSIS — O09.523 AMA (ADVANCED MATERNAL AGE) MULTIGRAVIDA 35+, THIRD TRIMESTER: Primary | ICD-10-CM

## 2022-03-24 PROCEDURE — 59025 FETAL NON-STRESS TEST: CPT | Performed by: NURSE PRACTITIONER

## 2022-03-24 PROCEDURE — 99212 OFFICE O/P EST SF 10 MIN: CPT | Performed by: NURSE PRACTITIONER

## 2022-03-24 NOTE — PATIENT INSTRUCTIONS
Patient Education        Week 45 of Your Pregnancy: Care Instructions  Overview     Believe it or not, your baby is almost here. You may have ideas about your baby's personality because of how much your baby moves. Or you may have noticed how your baby responds to sounds, warmth, cold, and light. You may even know what kind of music your baby likes. By now, you have a better idea of what to expect during delivery. You may have talked about your birth preferences with your doctor. But even if you want a vaginal birth, it's a good idea to learn about  births.  birth means that your baby is born through a cut (incision) in your lower belly. In some cases it may be the best choice for the health of you and your baby. Follow-up care is a key part of your treatment and safety. Be sure to make and go to all appointments, and call your doctor if you are having problems. It's also a good idea to know your test results and keep a list of the medicines you take. How can you care for yourself at home? Learn about  birth  · Most C-sections are unplanned. They are done because of problems that occur during labor. These problems might include:  ? Labor that slows or stops. ? High blood pressure or other problems for you.  ? Signs of distress in your baby. These signs may include a very fast or slow heart rate. · Although you and your baby are likely do well after a , it is major surgery. It has more risks than a vaginal delivery. · In some cases, a planned  may be safer than a vaginal delivery. This may be the case if:  ? You have a health problem, such as a heart condition. ? Your baby isn't in a head-down position for delivery. This is called a breech position. ? The uterus has scars from past surgeries. This could increase the chance of a tear in the uterus. ? There is a problem with the placenta.  ?  You have an infection, such as genital herpes, that could be spread to your baby.  ? Tarah Tee are having twins or more. ? Your baby weighs 9 to 10 pounds or more. · Because of the risks of a , planned C-sections generally should be done only for medical reasons. And a planned  should be done at 39 weeks or later unless there is a medical reason to do it sooner. Know what to expect after delivery, and plan for the first few weeks at home  · You, your baby, and your partner or  will get identification bands. Only people with matching bands can  the baby from the nursery. · You will learn how to feed, diaper, and bathe your baby. And you will learn how to care for the umbilical cord stump. If your baby will be circumcised, you will also learn how to care for that. · Ask people to wait to visit you until you are at home. And ask them to wash their hands before they touch your baby. · Make sure you have another adult in your home for at least 2 or 3 days after the birth. · During the first 2 weeks, limit when friends and family can visit. · Do not allow visitors who have colds or infections. Make sure all visitors are up to date with their vaccinations. Never let anyone smoke around your baby. · Try to nap when the baby naps. Be aware of postpartum depression  · \"Baby blues\" are common for the first 1 to 2 weeks after birth. You may cry or feel sad or irritable for no reason. · Sometimes these feelings last longer and are more intense. This is called postpartum depression. · If your symptoms last for more than a few weeks or you feel very depressed, ask your doctor for help. · Postpartum depression can be treated. Support groups and counseling can help. Sometimes medicine can also help. Where can you learn more? Go to https://[employer]. org and sign in to your [employer] account. Enter B044 in the "[employer]" box to learn more about \"Week 38 of Your Pregnancy: Care Instructions. \"     If you do not have an account, please click on the \"Sign Up Now\" link. Current as of: June 16, 2021               Content Version: 13.1  © 2006-2021 [employer]. Care instructions adapted under license by [employer]. If you have questions about a medical condition or this instruction, always ask your healthcare professional. [address] any warranty or liability for your use of this information.

## 2022-03-24 NOTE — PROGRESS NOTES
Bernardo Paez is here for a return obstetrical visit. Today she is 38w0d weeks EGA. She is doing well has no unusual complaints. She  does not havedoes not have vaginal bleeding, leaking of fluid, contractions. She does not have blurred vision, SOB, or increased swelling in legs or face. Pt does feel fetal movement regularly. Reactive NST per guidelines. Occasional contractions noted. Objective: Mother's Prenatal Vitals  BP: 124/76  Weight: 196 lb (88.9 kg)  Pulse: 65  Patient Position: Sitting  Prenatal Fetal Information  Fetal Heart Rate: NST-141  Movement: Present  Cervical Exam  Dilation (cm): 2  Effacement: 50  Station: [employer] (Labor Curve Graph): 5  Presentation: Vertex  Dil/Eff/Sta  Dilation (cm): 2  Effacement: 50  Station: [employer]  Pt is A&Ox3, in no acute distress. Normocephalic, atraumatic. PERRL. Resp even and non-labored. Skin pink, warm & dry. Gravid abdomen. GALVAN's well. Gait steady. Assessment:  IUP at 38w0d wks      Diagnosis Orders   1. AMA (advanced maternal age) multigravida 33+, third trimester  GA FETAL NON-STRESS TEST   2. Supervision of high risk pregnancy in third trimester     3. Diet controlled gestational diabetes mellitus (GDM) in third trimester     4. 38 weeks gestation of pregnancy       Plan:Pt counseled on GHTN precautions, Kick count and  labor  Continue with routine prenatal care. Has IOL on 3/27  RTC in 2 wk for PP visit    MEDICATIONS:  No orders of the defined types were placed in this encounter.       ORDERS:  Orders Placed This Encounter   Procedures    GA FETAL NON-STRESS TEST

## 2022-03-27 ENCOUNTER — HOSPITAL ENCOUNTER (INPATIENT)
Age: 42
LOS: 3 days | Discharge: HOME | DRG: 560 | End: 2022-03-30
Attending: OBSTETRICS & GYNECOLOGY | Admitting: OBSTETRICS & GYNECOLOGY
Payer: MEDICAID

## 2022-03-27 PROBLEM — Z34.90 ENCOUNTER FOR ELECTIVE INDUCTION OF LABOR: Status: ACTIVE | Noted: 2022-03-27

## 2022-03-27 LAB
ABO/RH: NORMAL
AMPHETAMINE SCREEN, URINE: NEGATIVE
ANTIBODY SCREEN: NORMAL
BARBITURATE SCREEN URINE: NEGATIVE
BENZODIAZEPINE SCREEN, URINE: NEGATIVE
CANNABINOID SCREEN URINE: NEGATIVE
COCAINE METABOLITE SCREEN URINE: NEGATIVE
HCT VFR BLD CALC: 35.8 % (ref 37–47)
HEMOGLOBIN: 11 G/DL (ref 12–16)
Lab: NORMAL
MCH RBC QN AUTO: 29.4 PG (ref 27–31)
MCHC RBC AUTO-ENTMCNC: 30.7 G/DL (ref 33–37)
MCV RBC AUTO: 95.7 FL (ref 81–99)
OPIATE SCREEN URINE: NEGATIVE
PDW BLD-RTO: 15 % (ref 11.5–14.5)
PLATELET # BLD: 193 K/UL (ref 130–400)
PMV BLD AUTO: 11 FL (ref 9.4–12.3)
RBC # BLD: 3.74 M/UL (ref 4.2–5.4)
SARS-COV-2, NAAT: NOT DETECTED
WBC # BLD: 7.5 K/UL (ref 4.8–10.8)

## 2022-03-27 PROCEDURE — 86901 BLOOD TYPING SEROLOGIC RH(D): CPT

## 2022-03-27 PROCEDURE — 2580000003 HC RX 258: Performed by: OBSTETRICS & GYNECOLOGY

## 2022-03-27 PROCEDURE — 86592 SYPHILIS TEST NON-TREP QUAL: CPT

## 2022-03-27 PROCEDURE — 36415 COLL VENOUS BLD VENIPUNCTURE: CPT

## 2022-03-27 PROCEDURE — 85027 COMPLETE CBC AUTOMATED: CPT

## 2022-03-27 PROCEDURE — 86850 RBC ANTIBODY SCREEN: CPT

## 2022-03-27 PROCEDURE — 86900 BLOOD TYPING SEROLOGIC ABO: CPT

## 2022-03-27 PROCEDURE — 80307 DRUG TEST PRSMV CHEM ANLYZR: CPT

## 2022-03-27 PROCEDURE — 87635 SARS-COV-2 COVID-19 AMP PRB: CPT

## 2022-03-27 PROCEDURE — 1220000000 HC SEMI PRIVATE OB R&B

## 2022-03-27 RX ORDER — SODIUM CHLORIDE 0.9 % (FLUSH) 0.9 %
5-40 SYRINGE (ML) INJECTION PRN
Status: DISCONTINUED | OUTPATIENT
Start: 2022-03-27 | End: 2022-03-28 | Stop reason: SDUPTHER

## 2022-03-27 RX ORDER — SODIUM CHLORIDE, SODIUM LACTATE, POTASSIUM CHLORIDE, CALCIUM CHLORIDE 600; 310; 30; 20 MG/100ML; MG/100ML; MG/100ML; MG/100ML
INJECTION, SOLUTION INTRAVENOUS CONTINUOUS
Status: DISCONTINUED | OUTPATIENT
Start: 2022-03-27 | End: 2022-03-28 | Stop reason: SDUPTHER

## 2022-03-27 RX ORDER — ONDANSETRON 2 MG/ML
4 INJECTION INTRAMUSCULAR; INTRAVENOUS EVERY 6 HOURS PRN
Status: DISCONTINUED | OUTPATIENT
Start: 2022-03-27 | End: 2022-03-30 | Stop reason: HOSPADM

## 2022-03-27 RX ORDER — SODIUM CHLORIDE 9 MG/ML
25 INJECTION, SOLUTION INTRAVENOUS PRN
Status: DISCONTINUED | OUTPATIENT
Start: 2022-03-27 | End: 2022-03-29 | Stop reason: SDUPTHER

## 2022-03-27 RX ORDER — SODIUM CHLORIDE, SODIUM LACTATE, POTASSIUM CHLORIDE, AND CALCIUM CHLORIDE .6; .31; .03; .02 G/100ML; G/100ML; G/100ML; G/100ML
1000 INJECTION, SOLUTION INTRAVENOUS PRN
Status: DISCONTINUED | OUTPATIENT
Start: 2022-03-27 | End: 2022-03-30 | Stop reason: HOSPADM

## 2022-03-27 RX ORDER — SODIUM CHLORIDE 0.9 % (FLUSH) 0.9 %
5-40 SYRINGE (ML) INJECTION EVERY 12 HOURS SCHEDULED
Status: DISCONTINUED | OUTPATIENT
Start: 2022-03-27 | End: 2022-03-28 | Stop reason: SDUPTHER

## 2022-03-27 RX ORDER — SODIUM CHLORIDE, SODIUM LACTATE, POTASSIUM CHLORIDE, AND CALCIUM CHLORIDE .6; .31; .03; .02 G/100ML; G/100ML; G/100ML; G/100ML
500 INJECTION, SOLUTION INTRAVENOUS PRN
Status: DISCONTINUED | OUTPATIENT
Start: 2022-03-27 | End: 2022-03-30 | Stop reason: HOSPADM

## 2022-03-27 RX ORDER — DOCUSATE SODIUM 100 MG/1
100 CAPSULE, LIQUID FILLED ORAL 2 TIMES DAILY
Status: DISCONTINUED | OUTPATIENT
Start: 2022-03-27 | End: 2022-03-28 | Stop reason: SDUPTHER

## 2022-03-27 RX ADMIN — SODIUM CHLORIDE, POTASSIUM CHLORIDE, SODIUM LACTATE AND CALCIUM CHLORIDE: 600; 310; 30; 20 INJECTION, SOLUTION INTRAVENOUS at 21:46

## 2022-03-27 NOTE — ADDENDUM NOTE
Encounter addended by: Val cSott MA on: 3/26/2022 9:02 PM   Actions taken: Charge Capture section accepted

## 2022-03-28 ENCOUNTER — ANESTHESIA EVENT (OUTPATIENT)
Dept: LABOR AND DELIVERY | Age: 42
DRG: 560 | End: 2022-03-28
Payer: MEDICAID

## 2022-03-28 ENCOUNTER — ANESTHESIA (OUTPATIENT)
Dept: LABOR AND DELIVERY | Age: 42
DRG: 560 | End: 2022-03-28
Payer: MEDICAID

## 2022-03-28 VITALS — DIASTOLIC BLOOD PRESSURE: 77 MMHG | OXYGEN SATURATION: 100 % | SYSTOLIC BLOOD PRESSURE: 125 MMHG

## 2022-03-28 LAB — RPR: NORMAL

## 2022-03-28 PROCEDURE — 2500000003 HC RX 250 WO HCPCS: Performed by: OBSTETRICS & GYNECOLOGY

## 2022-03-28 PROCEDURE — 3700000025 EPIDURAL BLOCK: Performed by: REGISTERED NURSE

## 2022-03-28 PROCEDURE — 6370000000 HC RX 637 (ALT 250 FOR IP): Performed by: OBSTETRICS & GYNECOLOGY

## 2022-03-28 PROCEDURE — 6360000002 HC RX W HCPCS: Performed by: OBSTETRICS & GYNECOLOGY

## 2022-03-28 PROCEDURE — 0UQMXZZ REPAIR VULVA, EXTERNAL APPROACH: ICD-10-PCS | Performed by: OBSTETRICS & GYNECOLOGY

## 2022-03-28 PROCEDURE — 2580000003 HC RX 258: Performed by: OBSTETRICS & GYNECOLOGY

## 2022-03-28 PROCEDURE — 1220000000 HC SEMI PRIVATE OB R&B

## 2022-03-28 PROCEDURE — 2500000003 HC RX 250 WO HCPCS: Performed by: REGISTERED NURSE

## 2022-03-28 PROCEDURE — 59409 OBSTETRICAL CARE: CPT | Performed by: OBSTETRICS & GYNECOLOGY

## 2022-03-28 PROCEDURE — 10907ZC DRAINAGE OF AMNIOTIC FLUID, THERAPEUTIC FROM PRODUCTS OF CONCEPTION, VIA NATURAL OR ARTIFICIAL OPENING: ICD-10-PCS | Performed by: OBSTETRICS & GYNECOLOGY

## 2022-03-28 PROCEDURE — 6360000002 HC RX W HCPCS: Performed by: REGISTERED NURSE

## 2022-03-28 PROCEDURE — 7200000001 HC VAGINAL DELIVERY

## 2022-03-28 PROCEDURE — 3E033VJ INTRODUCTION OF OTHER HORMONE INTO PERIPHERAL VEIN, PERCUTANEOUS APPROACH: ICD-10-PCS | Performed by: OBSTETRICS & GYNECOLOGY

## 2022-03-28 PROCEDURE — 0KQM0ZZ REPAIR PERINEUM MUSCLE, OPEN APPROACH: ICD-10-PCS | Performed by: OBSTETRICS & GYNECOLOGY

## 2022-03-28 RX ORDER — SODIUM CHLORIDE 0.9 % (FLUSH) 0.9 %
5-40 SYRINGE (ML) INJECTION PRN
Status: DISCONTINUED | OUTPATIENT
Start: 2022-03-28 | End: 2022-03-30 | Stop reason: HOSPADM

## 2022-03-28 RX ORDER — LIDOCAINE HYDROCHLORIDE 10 MG/ML
INJECTION, SOLUTION EPIDURAL; INFILTRATION; INTRACAUDAL; PERINEURAL PRN
Status: DISCONTINUED | OUTPATIENT
Start: 2022-03-28 | End: 2022-03-28 | Stop reason: SDUPTHER

## 2022-03-28 RX ORDER — FERROUS SULFATE 325(65) MG
325 TABLET ORAL 2 TIMES DAILY WITH MEALS
Status: DISCONTINUED | OUTPATIENT
Start: 2022-03-28 | End: 2022-03-30 | Stop reason: HOSPADM

## 2022-03-28 RX ORDER — ACETAMINOPHEN 325 MG/1
650 TABLET ORAL EVERY 4 HOURS PRN
Status: DISCONTINUED | OUTPATIENT
Start: 2022-03-28 | End: 2022-03-30 | Stop reason: HOSPADM

## 2022-03-28 RX ORDER — DOCUSATE SODIUM 100 MG/1
100 CAPSULE, LIQUID FILLED ORAL 2 TIMES DAILY
Status: DISCONTINUED | OUTPATIENT
Start: 2022-03-28 | End: 2022-03-30 | Stop reason: HOSPADM

## 2022-03-28 RX ORDER — FENTANYL CITRATE 50 UG/ML
INJECTION, SOLUTION INTRAMUSCULAR; INTRAVENOUS PRN
Status: DISCONTINUED | OUTPATIENT
Start: 2022-03-28 | End: 2022-03-28 | Stop reason: SDUPTHER

## 2022-03-28 RX ORDER — SODIUM CHLORIDE, SODIUM LACTATE, POTASSIUM CHLORIDE, CALCIUM CHLORIDE 600; 310; 30; 20 MG/100ML; MG/100ML; MG/100ML; MG/100ML
INJECTION, SOLUTION INTRAVENOUS CONTINUOUS
Status: DISCONTINUED | OUTPATIENT
Start: 2022-03-28 | End: 2022-03-30 | Stop reason: HOSPADM

## 2022-03-28 RX ORDER — ROPIVACAINE HYDROCHLORIDE 2 MG/ML
INJECTION, SOLUTION EPIDURAL; INFILTRATION; PERINEURAL CONTINUOUS PRN
Status: DISCONTINUED | OUTPATIENT
Start: 2022-03-28 | End: 2022-03-28 | Stop reason: SDUPTHER

## 2022-03-28 RX ORDER — EPHEDRINE SULFATE 50 MG/ML
INJECTION, SOLUTION INTRAVENOUS PRN
Status: DISCONTINUED | OUTPATIENT
Start: 2022-03-28 | End: 2022-03-28 | Stop reason: SDUPTHER

## 2022-03-28 RX ORDER — CARBOPROST TROMETHAMINE 250 UG/ML
250 INJECTION, SOLUTION INTRAMUSCULAR PRN
Status: DISCONTINUED | OUTPATIENT
Start: 2022-03-28 | End: 2022-03-30 | Stop reason: HOSPADM

## 2022-03-28 RX ORDER — LIDOCAINE HYDROCHLORIDE AND EPINEPHRINE 15; 5 MG/ML; UG/ML
INJECTION, SOLUTION EPIDURAL PRN
Status: DISCONTINUED | OUTPATIENT
Start: 2022-03-28 | End: 2022-03-28 | Stop reason: SDUPTHER

## 2022-03-28 RX ORDER — MISOPROSTOL 200 UG/1
800 TABLET ORAL PRN
Status: DISCONTINUED | OUTPATIENT
Start: 2022-03-28 | End: 2022-03-30 | Stop reason: HOSPADM

## 2022-03-28 RX ORDER — IBUPROFEN 400 MG/1
800 TABLET ORAL EVERY 6 HOURS PRN
Status: DISCONTINUED | OUTPATIENT
Start: 2022-03-28 | End: 2022-03-30 | Stop reason: HOSPADM

## 2022-03-28 RX ORDER — SODIUM CHLORIDE 0.9 % (FLUSH) 0.9 %
5-40 SYRINGE (ML) INJECTION EVERY 12 HOURS SCHEDULED
Status: DISCONTINUED | OUTPATIENT
Start: 2022-03-28 | End: 2022-03-29

## 2022-03-28 RX ORDER — SODIUM CHLORIDE 9 MG/ML
25 INJECTION, SOLUTION INTRAVENOUS PRN
Status: DISCONTINUED | OUTPATIENT
Start: 2022-03-28 | End: 2022-03-30 | Stop reason: HOSPADM

## 2022-03-28 RX ORDER — METHYLERGONOVINE MALEATE 0.2 MG/ML
200 INJECTION INTRAVENOUS PRN
Status: DISCONTINUED | OUTPATIENT
Start: 2022-03-28 | End: 2022-03-30 | Stop reason: HOSPADM

## 2022-03-28 RX ADMIN — FENTANYL CITRATE 100 MCG: 50 INJECTION, SOLUTION INTRAMUSCULAR; INTRAVENOUS at 17:50

## 2022-03-28 RX ADMIN — Medication 2.5 MILLION UNITS: at 10:01

## 2022-03-28 RX ADMIN — PENICILLIN G SODIUM 5 MILLION UNITS: 5000000 INJECTION, POWDER, FOR SOLUTION INTRAMUSCULAR; INTRAVENOUS at 02:00

## 2022-03-28 RX ADMIN — Medication 2.5 MILLION UNITS: at 13:58

## 2022-03-28 RX ADMIN — EPHEDRINE SULFATE 10 MG: 50 INJECTION INTRAMUSCULAR; INTRAVENOUS; SUBCUTANEOUS at 14:12

## 2022-03-28 RX ADMIN — LIDOCAINE HYDROCHLORIDE AND EPINEPHRINE 2 ML: 15; 5 INJECTION, SOLUTION EPIDURAL at 12:46

## 2022-03-28 RX ADMIN — SODIUM CHLORIDE, POTASSIUM CHLORIDE, SODIUM LACTATE AND CALCIUM CHLORIDE: 600; 310; 30; 20 INJECTION, SOLUTION INTRAVENOUS at 21:52

## 2022-03-28 RX ADMIN — ACETAMINOPHEN 650 MG: 325 TABLET ORAL at 21:44

## 2022-03-28 RX ADMIN — FENTANYL CITRATE 100 MCG: 50 INJECTION, SOLUTION INTRAMUSCULAR; INTRAVENOUS at 12:46

## 2022-03-28 RX ADMIN — ROPIVACAINE HYDROCHLORIDE 10 ML/HR: 2 INJECTION, SOLUTION EPIDURAL; INFILTRATION; PERINEURAL at 12:46

## 2022-03-28 RX ADMIN — FENTANYL CITRATE 100 MCG: 50 INJECTION, SOLUTION INTRAMUSCULAR; INTRAVENOUS at 17:19

## 2022-03-28 RX ADMIN — EPHEDRINE SULFATE 5 MG: 50 INJECTION INTRAMUSCULAR; INTRAVENOUS; SUBCUTANEOUS at 13:17

## 2022-03-28 RX ADMIN — Medication 1 MILLI-UNITS/MIN: at 02:03

## 2022-03-28 RX ADMIN — FENTANYL CITRATE 100 MCG: 50 INJECTION, SOLUTION INTRAMUSCULAR; INTRAVENOUS at 18:45

## 2022-03-28 RX ADMIN — LIDOCAINE HYDROCHLORIDE 5 ML: 10 INJECTION, SOLUTION EPIDURAL; INFILTRATION; INTRACAUDAL; PERINEURAL at 12:42

## 2022-03-28 RX ADMIN — Medication 87.3 MILLI-UNITS/MIN: at 18:57

## 2022-03-28 RX ADMIN — IBUPROFEN 800 MG: 400 TABLET ORAL at 20:25

## 2022-03-28 RX ADMIN — SODIUM CHLORIDE, POTASSIUM CHLORIDE, SODIUM LACTATE AND CALCIUM CHLORIDE: 600; 310; 30; 20 INJECTION, SOLUTION INTRAVENOUS at 12:48

## 2022-03-28 RX ADMIN — Medication 2.5 MILLION UNITS: at 18:02

## 2022-03-28 RX ADMIN — Medication 2.5 MILLION UNITS: at 05:28

## 2022-03-28 RX ADMIN — Medication 10 MILLI-UNITS/MIN: at 13:56

## 2022-03-28 ASSESSMENT — PAIN SCALES - GENERAL
PAINLEVEL_OUTOF10: 5
PAINLEVEL_OUTOF10: 5

## 2022-03-28 ASSESSMENT — LIFESTYLE VARIABLES: SMOKING_STATUS: 0

## 2022-03-28 NOTE — FLOWSHEET NOTE
Pt ambulated to labor and delivery unit, reports she is here for a scheduled induction of labor. Denies leaking of fluid, denies vaginal bleeding, + fetal movement, TOCO and EFM placed to soft non-tender abd.

## 2022-03-28 NOTE — ANESTHESIA PROCEDURE NOTES
Epidural Block    Patient location during procedure: OB  Start time: 3/28/2022 12:41 PM  End time: 3/28/2022 12:46 PM  Reason for block: labor epidural  Staffing  Performed: resident/CRNA   Resident/CRNA: EDELMIRA Zuniga CRNA  Preanesthetic Checklist  Completed: patient identified, IV checked, site marked, risks and benefits discussed, surgical consent, monitors and equipment checked, pre-op evaluation, timeout performed, anesthesia consent given, oxygen available and patient being monitored  Epidural  Patient position: sitting  Prep: Betadine  Patient monitoring: cardiac monitor, continuous pulse ox and frequent blood pressure checks  Approach: midline  Location: lumbar (1-5)  Injection technique: WAYLON air  Guidance: paresthesia technique  Provider prep: mask and sterile gloves  Needle  Needle type: Tuohy   Needle gauge: 17 G  Needle length: 3.5 in  Needle insertion depth: 5 cm  Catheter type: end hole  Catheter size: 19 G  Catheter at skin depth: 9 cm  Test dose: negative  Assessment  Sensory level: T10  Hemodynamics: stable  Attempts: 1  Additional Notes  R/B and H/P reviewed with patient. Sitting position. Betadine x3 allowed to dry. Wiped clean. Sterile drape applied and Sterile technique maintained throughout procedure. Lidocaine 1% intradermal at L3-L4. Touhy needle used with WAYLON with NS technique. Catheter placed with ease after loss of resistance. Test dose Lidocaine 1.5% with epi 1:200,000 2 ml given with negative response. Epidural taped and secured. Patient laid semi-fowlers position, can wiggle toes, c/o numbness in legs. Patient remains comfortable and hemodynamically stable. Monitoring pt closely.

## 2022-03-28 NOTE — FLOWSHEET NOTE
Dr Alberto Osborne notified of pt sve, ctx pattern, and reactive strip.  Telephone order to start pitocin at 2am.

## 2022-03-28 NOTE — ANESTHESIA PRE PROCEDURE
Department of Anesthesiology  Preprocedure Note       Name:  Merline Golden   Age:  39 y.o.  :  1980                                          MRN:  [id number]         Date:  3/28/2022      Surgeon: * No surgeons listed *    Procedure: * No procedures listed *    Medications prior to admission:   Prior to Admission medications    Medication Sig Start Date End Date Taking?  Authorizing Provider   Prenatal Vit-Fe Fumarate-FA (PRENATAL VITAMIN PO) Take 1 tablet by mouth daily    Historical Provider, MD   ferrous sulfate (IRON 325) 325 (65 Fe) MG tablet Take 1 tablet by mouth 2 times daily 21   EDELMIRA Soto       Current medications:    Current Facility-Administered Medications   Medication Dose Route Frequency Provider Last Rate Last Admin    lactated ringers infusion   IntraVENous Continuous Tavo Tejeda  mL/hr at 22 Rate Verify at 22    lactated ringers bolus  500 mL IntraVENous PRN Tavo Tejeda MD        Or    lactated ringers bolus  1,000 mL IntraVENous PRN Tavo Tejeda MD        sodium chloride flush 0.9 % injection 5-40 mL  5-40 mL IntraVENous 2 times per day Tavo Tejeda MD        sodium chloride flush 0.9 % injection 5-40 mL  5-40 mL IntraVENous PRN Tavo Tejeda MD        0.9 % sodium chloride infusion  25 mL IntraVENous PRN Tavo Tejeda MD        miSOPROStol (CYTOTEC) pre-split tablet TABS 25 mcg  25 mcg Vaginal Q4H Tavo Tejeda MD        oxytocin (PITOCIN) 30 units in 500 mL infusion  1 rosario-units/min IntraVENous Continuous Tavo Tejeda MD 16 mL/hr at 22 16 rosario-units/min at 22    oxytocin (PITOCIN) 30 units in 500 mL infusion  87.3 rosario-units/min IntraVENous Continuous PRN Tavo Tejeda MD        And    oxytocin (PITOCIN) 10 unit bolus from the bag  10 Units IntraVENous PRN Tavo Tejeda MD        ondansetron [facility] injection 4 mg  4 mg IntraVENous Q6H PRN Mavis Maldonado MD        docusate sodium (COLACE) capsule 100 mg  100 mg Oral BID Mavis Maldonado MD        penicillin G potassium in d5w IVPB 2.5 Million Units  2.5 Million Units IntraVENous Q4H Mavis Maldonado  mL/hr at 03/28/22 1001 2.5 Million Units at 03/28/22 1001       Allergies: Allergies   Allergen Reactions    Codeine     Lactose Intolerance (Gi)        Problem List:    Patient Active Problem List   Diagnosis Code    Uterine fibroids affecting pregnancy in second trimester O34.12, D25.9    High risk pregnancy, antepartum O09.90    History of gestational diabetes Z80.34    AMA (advanced maternal age) multigravida 35+, second trimester O09.522    GBS carrier Z20.0    Encounter for elective induction of labor Z34.90       Past Medical History:        Diagnosis Date    Anemia     GBS carrier     Uterine fibroid        Past Surgical History:  History reviewed. No pertinent surgical history. Social History:    Social History     Tobacco Use    Smoking status: Never Smoker    Smokeless tobacco: Never Used   Substance Use Topics    Alcohol use: No                                Counseling given: Not Answered      Vital Signs (Current):   Vitals:    03/28/22 0500 03/28/22 0601 03/28/22 0701 03/28/22 0801   BP: 127/75 128/78 120/71 124/85   Pulse: 58 59 59 60   Resp:   18    Temp: 97.6 °F (36.4 °C)  98.2 °F (36.8 °C)    TempSrc: Temporal  Temporal    Weight:       Height:                                                  BP Readings from Last 3 Encounters:   03/28/22 124/85   03/24/22 124/76   03/21/22 131/81       NPO Status:                                                                                 BMI:   Wt Readings from Last 3 Encounters:   03/27/22 196 lb (88.9 kg)   03/24/22 196 lb (88.9 kg)   03/21/22 196 lb (88.9 kg)     Body mass index is 28.94 kg/m².     CBC:   Lab Results   Component Value Date    WBC 7.5 03/27/2022    RBC 3.74 03/27/2022    HGB 11.0 03/27/2022    HCT 35.8 03/27/2022    MCV 95.7 03/27/2022    RDW 15.0 03/27/2022     03/27/2022       CMP:   Lab Results   Component Value Date     09/28/2021    K 3.6 09/28/2021     09/28/2021    CO2 25 09/28/2021    BUN 7 09/28/2021    CREATININE <0.5 09/28/2021    LABGLOM >60 09/28/2021    GLUCOSE 106 09/28/2021    PROT 6.4 09/28/2021    CALCIUM 9.3 09/28/2021    BILITOT <0.2 09/28/2021    ALKPHOS 48 09/28/2021    AST 15 09/28/2021    ALT 9 09/28/2021       POC Tests: No results for input(s): POCGLU, POCNA, POCK, POCCL, POCBUN, POCHEMO, POCHCT in the last 72 hours.     Coags: No results found for: PROTIME, INR, APTT    HCG (If Applicable): No results found for: PREGTESTUR, PREGSERUM, HCG, HCGQUANT     ABGs: No results found for: PHART, PO2ART, EBQ8KME, MYB3JSU, BEART, D6LNICIP     Type & Screen (If Applicable):  No results found for: LABABO, LABRH    Drug/Infectious Status (If Applicable):  No results found for: HIV, HEPCAB    COVID-19 Screening (If Applicable):   Lab Results   Component Value Date    COVID19 Not Detected 03/27/2022           Anesthesia Evaluation   no history of anesthetic complications:   Airway: Mallampati: II  TM distance: >3 FB   Neck ROM: full  Mouth opening: > = 3 FB Dental: normal exam         Pulmonary:Negative Pulmonary ROS and normal exam        (-) COPD, asthma and not a current smoker                           Cardiovascular:Negative CV ROS  Exercise tolerance: good (>4 METS),       (-) hypertension, CAD, CABG/stent, orthopnea and  NAPIER    NYHA Classification: I                 Beta Blocker:  Not on Beta Blocker         Neuro/Psych:   Negative Neuro/Psych ROS     (-) seizures and CVA           GI/Hepatic/Renal: Neg GI/Hepatic/Renal ROS       (-) GERD, liver disease and no renal disease       Endo/Other: Negative Endo/Other ROS   (+) Diabetesno interval change, , .    (-) hypothyroidism                ROS comment: GDM Abdominal: Vascular: negative vascular ROS. - DVT and PE. Other Findings:             Anesthesia Plan      epidural     ASA 2             Anesthetic plan and risks discussed with patient. Use of blood products discussed with patient whom.      Attending anesthesiologist reviewed and agrees with Preprocedure content              EDELMIRA Cummings - CRNA   3/28/2022

## 2022-03-28 NOTE — FLOWSHEET NOTE
Pitocin stopped d/t prolonged FHR decceleration. Orders to restart pitocin at half in a half hour if FHR is reassuring per Dr. Ky Chaves. Will continue with POC.

## 2022-03-29 LAB
BASOPHILS ABSOLUTE: 0 K/UL (ref 0–0.2)
BASOPHILS RELATIVE PERCENT: 0.3 % (ref 0–1)
EOSINOPHILS ABSOLUTE: 0.1 K/UL (ref 0–0.6)
EOSINOPHILS RELATIVE PERCENT: 1.2 % (ref 0–5)
HCT VFR BLD CALC: 30.5 % (ref 37–47)
HEMOGLOBIN: 9.5 G/DL (ref 12–16)
IMMATURE GRANULOCYTES #: 0.1 K/UL
LYMPHOCYTES ABSOLUTE: 1.7 K/UL (ref 1.1–4.5)
LYMPHOCYTES RELATIVE PERCENT: 18.2 % (ref 20–40)
MCH RBC QN AUTO: 29.6 PG (ref 27–31)
MCHC RBC AUTO-ENTMCNC: 31.1 G/DL (ref 33–37)
MCV RBC AUTO: 95 FL (ref 81–99)
MONOCYTES ABSOLUTE: 0.7 K/UL (ref 0–0.9)
MONOCYTES RELATIVE PERCENT: 7.5 % (ref 0–10)
NEUTROPHILS ABSOLUTE: 6.5 K/UL (ref 1.5–7.5)
NEUTROPHILS RELATIVE PERCENT: 71.8 % (ref 50–65)
PDW BLD-RTO: 14.8 % (ref 11.5–14.5)
PLATELET # BLD: 151 K/UL (ref 130–400)
PMV BLD AUTO: 10.4 FL (ref 9.4–12.3)
RBC # BLD: 3.21 M/UL (ref 4.2–5.4)
WBC # BLD: 9.1 K/UL (ref 4.8–10.8)

## 2022-03-29 PROCEDURE — 85025 COMPLETE CBC W/AUTO DIFF WBC: CPT

## 2022-03-29 PROCEDURE — 99024 POSTOP FOLLOW-UP VISIT: CPT | Performed by: NURSE PRACTITIONER

## 2022-03-29 PROCEDURE — 1220000000 HC SEMI PRIVATE OB R&B

## 2022-03-29 PROCEDURE — 36415 COLL VENOUS BLD VENIPUNCTURE: CPT

## 2022-03-29 PROCEDURE — 6370000000 HC RX 637 (ALT 250 FOR IP): Performed by: OBSTETRICS & GYNECOLOGY

## 2022-03-29 RX ADMIN — FERROUS SULFATE TAB 325 MG (65 MG ELEMENTAL FE) 325 MG: 325 (65 FE) TAB at 17:08

## 2022-03-29 RX ADMIN — IBUPROFEN 800 MG: 400 TABLET ORAL at 08:01

## 2022-03-29 RX ADMIN — FERROUS SULFATE TAB 325 MG (65 MG ELEMENTAL FE) 325 MG: 325 (65 FE) TAB at 08:02

## 2022-03-29 RX ADMIN — DOCUSATE SODIUM 100 MG: 100 CAPSULE, LIQUID FILLED ORAL at 08:01

## 2022-03-29 RX ADMIN — IBUPROFEN 800 MG: 400 TABLET ORAL at 17:08

## 2022-03-29 RX ADMIN — BENZOCAINE AND LEVOMENTHOL: 200; 5 SPRAY TOPICAL at 08:08

## 2022-03-29 ASSESSMENT — PAIN SCALES - GENERAL
PAINLEVEL_OUTOF10: 1
PAINLEVEL_OUTOF10: 0
PAINLEVEL_OUTOF10: 1

## 2022-03-29 NOTE — PROGRESS NOTES
Subjective:     Postpartum Day 1: Vaginal Delivery    Patient is a [id number] The patient feels well. The patient denies emotional concerns. Pain is well controlled with current medications. The baby iswell. Baby is feeding via breast. Urinary output is adequate. The patient is ambulating well. The patient is tolerating a normal diet. Flatus has been passed. Objective:      Patient Vitals for the past 8 hrs:   BP Temp Temp src Pulse Resp SpO2   03/29/22 0605 114/66 97.7 °F (36.5 °C) Temporal 55 16 96 %   03/29/22 0146 120/73 -- -- 56 16 98 %     General:    alert, appears stated age and cooperative   Bowel Sounds:  active   Lochia:  appropriate   Uterine Fundus:   firm   Episiotomy:  healing well, no significant drainage, no dehiscence, no significant erythema   DVT Evaluation:  No evidence of DVT seen on physical exam.     Lab Results   Component Value Date    WBC 9.1 03/29/2022    HGB 9.5 (L) 03/29/2022    HCT 30.5 (L) 03/29/2022    MCV 95.0 03/29/2022     03/29/2022     Assessment:     Status post vaginal delivery. Doing well postpartum. Plan:     Continue current care. Will start po iron bid.

## 2022-03-29 NOTE — FLOWSHEET NOTE
Paty Winters CRNA to room to pull epidural catheter. Pt tolerated well, catheter tip intact. Pt complaining of headache at this time. /76 at last check. Paty Winters CRNA recommended to pt that she drink something with caffeine and get plenty of fluids. IV fluids resumed at this time.

## 2022-03-29 NOTE — FLOWSHEET NOTE
Pt assisted to sit on side of bed. Pt tolerated standing and marching in place. Pt ambulated to restroom with standby assist. Voided large amount and moved bowels. Zeina care provided. New gown donned. Pt ambulated to room 215 independently.

## 2022-03-29 NOTE — FLOWSHEET NOTE
Vianey Bateser, CRNA contacted via phone to inquire about claming epidural tubing. Clarisse Abad CRNA determined it would be better to pull catheter than to leave it in to avoid risk of infection. Jef Duran CRNA en route to pull catheter.

## 2022-03-29 NOTE — LACTATION NOTE
Infant Name: Baby  Girl  Gestation: 38.4  Day of Life: 1  Birth weight: 7-0.2 lb (3180g)  Today's weight: 7-0 lb (3175g)  Weight loss: -0.16%  24 hour summary of feeds: breastfeeding x 3, attempt x 3  Voids: 0  Stools: 0  Assistive device: none  Maternal History: , anemia, utreine fibroid, gbs carrier,   Maternal Medications: ferrous sulfate, PNV  Maternal Goal: one day at a time  Breast pump for home: yes    Assisted mother with positioning and latching baby to both breast, football, cross-cradle position. Multiple attempts made. Baby reluctant to latch. Encouraged mother to start pumping. Hospital grade electric pump provided. Instructions for set up and cleaning given. Instructed to breastfeed every 2-3 hours for 15-20 mins each side or on demand. Hand expression and breast compression encouraged to increase milk transfer while breastfeeding and pumping. Instructed to pump for 15 mins after breastfeeding, giving baby every drop of colostrum/EBM. Observed pumping sessions flanged fit appropriate, 27 mm. Discussed supply and demand, and the benefits of colostrum, skin to skin and the importance of good positioning and latch. Informed mother that baby can be very sleepy the first 24 hours and typically the 2nd night babies will be more awake and want to feed a lot and that this is normal and important in establishing milk supply. Mother understands and agrees with feeding plan. Encouraged to call out for help with feedings.

## 2022-03-29 NOTE — ANESTHESIA POSTPROCEDURE EVALUATION
Department of Anesthesiology  Postprocedure Note    Patient: Parag Rdz  MRN: [id number]  YOB: 1980  Date of evaluation: 3/28/2022  Time:  9:38 PM     Procedure Summary     Date: 03/28/22 Room / Location:     Anesthesia Start: 8045 Anesthesia Stop: 1853    Procedure: Labor Analgesia Diagnosis:     Scheduled Providers:  Responsible Provider: EDELMIRA Adler CRNA    Anesthesia Type: epidural ASA Status: 2          Anesthesia Type: epidural    Beck Phase I:      Beck Phase II:      Last vitals: Reviewed and per EMR flowsheets.        Anesthesia Post Evaluation    Patient location during evaluation: bedside  Patient participation: complete - patient participated  Level of consciousness: awake and alert  Pain score: 2 (c/o HA)  Airway patency: patent  Nausea & Vomiting: no nausea and no vomiting  Complications: no  Cardiovascular status: hemodynamically stable  Respiratory status: room air, acceptable and spontaneous ventilation  Hydration status: stable

## 2022-03-29 NOTE — PLAN OF CARE
Assumed care of patient this shift. Pt is up ad priscilla and performing ADLs independently. Pt has ate well and voided this shift. Pt has had appropriate affect and has not expressed any questions or concerns at this time.

## 2022-03-30 VITALS
HEIGHT: 69 IN | SYSTOLIC BLOOD PRESSURE: 127 MMHG | RESPIRATION RATE: 16 BRPM | DIASTOLIC BLOOD PRESSURE: 83 MMHG | HEART RATE: 62 BPM | OXYGEN SATURATION: 99 % | WEIGHT: 196 LBS | BODY MASS INDEX: 29.03 KG/M2 | TEMPERATURE: 97.2 F

## 2022-03-30 PROCEDURE — 99239 HOSP IP/OBS DSCHRG MGMT >30: CPT | Performed by: NURSE PRACTITIONER

## 2022-03-30 PROCEDURE — 6370000000 HC RX 637 (ALT 250 FOR IP): Performed by: OBSTETRICS & GYNECOLOGY

## 2022-03-30 RX ORDER — IBUPROFEN 800 MG/1
800 TABLET ORAL EVERY 6 HOURS PRN
Qty: 90 TABLET | Refills: 0 | Status: SHIPPED | OUTPATIENT
Start: 2022-03-30 | End: 2022-05-09

## 2022-03-30 RX ADMIN — FERROUS SULFATE TAB 325 MG (65 MG ELEMENTAL FE) 325 MG: 325 (65 FE) TAB at 09:22

## 2022-03-30 NOTE — LACTATION NOTE
Infant Name: Baby  Girl  Gestation: 38.4  Day of Life: 2  Birth weight: 7-0.2 lb (3180g)  Yesterday's weight: 7-0 lb (3175g)  Today's weight: 6-9.6 lb (4891A)  Weight loss: --5.82%  24 hour summary of feeds: breastfeeding x 3, attempt x 3  Voids: 2  Stools: 3  Assistive device: none  Maternal History: , anemia, utreine fibroid, gbs carrier,   Maternal Medications: ferrous sulfate, PNV  Maternal Goal: one day at a time  Breast pump for home: yes    Mother was breastfeeding baby upon entering room, baby sleepy. Encouraged mother to continue pumping when she goes home with her electric pump provided. Instructions for set up and cleaning given. Instructed to breastfeed every 2-3 hours for 15-20 mins each side or on demand. Hand expression and breast compression encouraged to increase milk transfer while breastfeeding and pumping. Instructed to pump for 15 mins after breastfeeding, giving baby every drop of colostrum/EBM and then formula feed making sure baby is eating 1.5-2 oz. Mother and baby will be discharged today, weight check to follow. Instructions and handouts given over management of sore nipples, engorgement, plugged ducts, mastitis, hydration, nutrition, and medications that could effect milk supply. Mother knows when to call MD if needed. Breastfeeding booklet given. Lactation number provided.

## 2022-03-30 NOTE — FLOWSHEET NOTE
Discharge teaching reviewed. All questions answered. Follow up appointments made. Pt will be leaving unit per wheelchair.

## 2022-03-30 NOTE — DISCHARGE SUMMARY
Subjective:     Postpartum Day 2: Vaginal Delivery    Patient is a [id number] The patient feels well. The patient denies emotional concerns. Pain is well controlled with current medications. The baby iswell. Baby is feeding via breast. Urinary output is adequate. The patient is ambulating well. The patient is tolerating a normal diet. Flatus has been passed. Objective:      Patient Vitals for the past 8 hrs:   BP Pulse Resp SpO2   03/30/22 0606 127/83 62 16 99 %     General:    alert, appears stated age and cooperative   Bowel Sounds:  active   Lochia:  appropriate   Uterine Fundus:   firm   Episiotomy:  healing well, no significant drainage, no dehiscence, no significant erythema   DVT Evaluation:  No evidence of DVT seen on physical exam.     Lab Results   Component Value Date    WBC 9.1 03/29/2022    HGB 9.5 (L) 03/29/2022    HCT 30.5 (L) 03/29/2022    MCV 95.0 03/29/2022     03/29/2022     Assessment:     Status post vaginal delivery. Doing well postpartum. Plan:     Discharge home with standard precautions and return to clinic in 2 weeks. Over 50% of the total visit time of 35 minutes was spent on counseling and/or coordination of care. All questions were answered and the patient voiced understanding.

## 2022-04-01 ENCOUNTER — HOSPITAL ENCOUNTER (OUTPATIENT)
Dept: LABOR AND DELIVERY | Age: 42
Discharge: HOME | End: 2022-04-01
Payer: MEDICAID

## 2022-04-01 PROCEDURE — S9443 LACTATION CLASS: HCPCS

## 2022-04-01 NOTE — H&P
Usama Hodge is a 39 y.o. female patient presenting for IOL secondary to worsening control of Class A2 DM. No diagnosis found. Past Medical History:   Diagnosis Date    Anemia     GBS carrier     Uterine fibroid      OB History        2    Para   2    Term   2            AB        Living   2       SAB        IAB        Ectopic        Molar        Multiple   0    Live Births   2              38w4d  Estimated Date of Delivery: 22  Allergies   Allergen Reactions    Codeine     Lactose Intolerance (Gi)      Principal Problem:    Encounter for elective induction of labor  Active Problems:     (normal spontaneous vaginal delivery)  Resolved Problems:    * No resolved hospital problems. *    Blood pressure 127/83, pulse 62, temperature 97.2 °F (36.2 °C), temperature source Temporal, resp. rate 16, height 5' 9\" (1.753 m), weight 196 lb (88.9 kg), last menstrual period 2021, SpO2 99 %, unknown if currently breastfeeding. Maternal Medical History:   Fetal activity: Perceived fetal activity is normal.      Prenatal complications: AMA    Prenatal Complications - Diabetes: gestational.  Diabetes is managed by diet. Maternal Exam:   Abdomen: Patient reports no abdominal tenderness. Fetal presentation: vertex    Introitus: Normal vulva. Normal vagina. Pelvis: adequate for delivery. Fetal Exam  Fetal Monitor Review: Mode: ultrasound. Variability: moderate (6-25 bpm). Pattern: accelerations present and no decelerations. Fetal State Assessment: Category I - tracings are normal.          Assessment:  1. IUP at 38 4/7 wks  2. AMA  3.  Class A2 DM  4. GBS positive    Plan:  1. Admit for IOL  2. Routine admission labs and intrapartum care  3. PCN prophylaxis  4. Analgesia per patient request  5.   Jori Charles MD  2022

## 2022-04-01 NOTE — L&D DELIVERY NOTE
Mother's Information    Labor Events     Labor?: No  Cervical Ripening:   Now         Bettye Gonsalves [[id number]]    Labor Events     Labor?: No   Steroids?: None  Cervical Ripening Date/Time:     Antibiotics Received during Labor?: No  Rupture Identifier: Sac 1   Rupture Date/Time: 3/28/22 12:21:00   Rupture Type: AROM, Intact  Fluid Color: Clear  Fluid Odor: None  Fluid Volume: Moderate  Induction: AROM, Oxytocin  Augmentation: None  Labor Complications: None     Anesthesia    Method: Epidural     Start Pushing    Labor onset date/time:   Now   Dilation complete date/time: 3/28/22 18:33:00 EDT Now   Start pushing date/time: 3/28/2022 18:36:00   Decision date/time (emergent ):       Labor Length    2nd stage: 1h 20m  3rd stage: 0h 09m     Delivery ([place])    Delivery Date/Time:  3/28/22 18:53:00   Delivery Type: Vaginal, Spontaneous    Details:         Presentation    Presentation: Vertex  _: Occiput  _: Anterior     Shoulder Dystocia    Shoulder Dystocia Present?: No  Add Second Maneuver  Add Third Maneuver  Add Fourth Maneuver  Add Fifth Maneuver  Add Sixth Maneuver  Add Seventh Maneuver  Add Eighth Maneuver  Add Ninth Maneuver     Assisted Delivery Details    Forceps Attempted?: No  Vacuum Extractor Attempted?: Yes  Indications: Fetal Heart Rate or Rhythm Abnormality   Vacuum Type: Kiwi   Vacuum Application Location: Mid   First Attempt Time Vacuum Applied: 1851 CDT    First Attempt Time Vacuum Removed: 1851 CDT       Document Additional Attempt   Second Attempt Time Vacuum Applied: 1852 CDT   Second Attempt Time Vacuum Removed: 1852 CDT       Document Additional Attempt   Third Attempt Time Vacuum Applied: 1852 CDT   Third Attempt Time Vacuum Removed: 1853 CDT       Number of Pop Offs: 2      Number of Pulls: 3    Vacuum Applied By: DR. ASIF    Failed?: No      Cord    Vessels: 3 Vessels  Complications: None  Delayed Cord Clamping?: Yes  Cord Clamped Date/Time: 3/28/2022 18:55:00  Cord Blood Disposition: Discard  Gases Sent?: No     Placenta    Date/Time: 3/28/2022 19:02:00  Removal: Spontaneous  Appearance: Intact  Disposition: Discarded     Lacerations    Episiotomy: None  Perineal Lacerations: 2nd  Other Lacerations: periurethral laceration  Number of Repair Packets: 1     Vaginal Counts    Initial Count Personnel: MULU HERNANDEZ  Initial Count Verified By: Kenny Ventura    Sponges [employer]   Initial Counts Correct Correct    Final Counts Correct Correct    Final Count Personnel: MULU HERNANDEZ  Final Count Verified By: Kenny Ventura  Accurate Final Count?: Yes  If the count is incorrect due to Intentionally Retained Foreign Object (IRFO) add the IRFO LDA in Lines/Drains. Add LDA: Link to [facility]     Blood Loss  Mother: Sydnie Alvarez #[id number]   Start of Mother's Information    Delivery Blood Loss  22 0653 - 22 1853    None           End of Mother's Information  Mother: Sydnie Alvarez #[id number]          Delivery Providers    Delivering clinician: Rosibel Ramirez MD     Provider Role    Rosibel Ramirez MD Obstetrician    Jocy Gallardo RN Primary Nurse    Yecenia Sahni RN Primary [place] Nurse    Beltran Valencia [facility] Tech          [place] Assessment    Living Status: Living  Delivery Location: L&D     Apgar Scoring Key:    0 1 2    Skin Color: Blue or pale Acrocyanotic Completely pink    Heart Rate: Absent <100 bpm >100 bpm    Reflex Irritability: No response Grimace Cry or active withdrawal    Muscle Tone: Limp Some flexion Active motion    Respiratory Effort: Absent Weak cry; hypoventilation Good, crying                  Skin Color:   Heart Rate:   Reflex Irritability:   Muscle Tone:   Respiratory Effort:    Total:            1 Minute:    1    2    2    1    2    8        Apgar 1 total from OB History    5 Minute:    1    2    2    2    2    9        Apgar 5 total from OB History    10 Minute:              15 Minute: 20 Minute:                      Apgars Assigned By: [facility]          Resuscitation    Method: Bulb Suction, Stimulation           [place] Measurements    Birth Weight: 3180 g Birth Length: 0.508 m   Head Circumference: 0.35 m           Title    Skin to Skin Initiation Date/Time: 3/28/22 18:55:00 CDT   Skin to Skin With: Mother   Skin to Skin End Date/Time:

## 2022-04-11 ENCOUNTER — POSTPARTUM VISIT (OUTPATIENT)
Dept: OBGYN CLINIC | Age: 42
End: 2022-04-11
Payer: MEDICAID

## 2022-04-11 VITALS — WEIGHT: 176 LBS | BODY MASS INDEX: 26.07 KG/M2 | HEIGHT: 69 IN

## 2022-04-11 DIAGNOSIS — Z13.32 ENCOUNTER FOR SCREENING FOR MATERNAL DEPRESSION: ICD-10-CM

## 2022-04-11 PROCEDURE — 99213 OFFICE O/P EST LOW 20 MIN: CPT | Performed by: OBSTETRICS & GYNECOLOGY

## 2022-04-11 RX ORDER — SERTRALINE HYDROCHLORIDE 25 MG/1
25 TABLET, FILM COATED ORAL DAILY
Qty: 30 TABLET | Refills: 3 | Status: ON HOLD | OUTPATIENT
Start: 2022-04-11 | End: 2022-06-17 | Stop reason: HOSPADM

## 2022-04-11 ASSESSMENT — ENCOUNTER SYMPTOMS
GASTROINTESTINAL NEGATIVE: 1
EYES NEGATIVE: 1
RESPIRATORY NEGATIVE: 1

## 2022-04-11 NOTE — PROGRESS NOTES
John Floyd is a 39 y.o.  who presents today for her 2 week postpartum visit following a vaginal delivery on 3-28-22. Pt states she is having some postpartum blues. Pt states her mood is up and down. She is feeling more down than she'd like. Feels like she could use some help. Review of Systems   Constitutional: Negative. HENT: Negative. Eyes: Negative. Respiratory: Negative. Cardiovascular: Negative. Gastrointestinal: Negative. Genitourinary: Negative. Negative for dysuria, frequency, menstrual problem, pelvic pain, urgency and vaginal discharge. Skin: Negative. Neurological: Negative. Psychiatric/Behavioral: Negative. Past Medical History:   Diagnosis Date    Anemia     GBS carrier     Uterine fibroid        History reviewed. No pertinent surgical history. Family History   Problem Relation Age of Onset    Colon Cancer Maternal Aunt     Cancer Paternal Uncle         melanoma     Cancer Maternal Grandmother         uterine/ovarian       Social History     Socioeconomic History    Marital status: Single     Spouse name: Not on file    Number of children: Not on file    Years of education: Not on file    Highest education level: Not on file   Occupational History    Not on file   Tobacco Use    Smoking status: Never Smoker    Smokeless tobacco: Never Used   Substance and Sexual Activity    Alcohol use: No    Drug use: Never    Sexual activity: Not on file   Other Topics Concern    Not on file   Social History Narrative    Not on file     Social Determinants of Health     Financial Resource Strain:     Difficulty of Paying Living Expenses: Not on file   Food Insecurity:     Worried About Running Out of Food in the Last Year: Not on file    Shameka of Food in the Last Year: Not on file   Transportation Needs:     Lack of Transportation (Medical): Not on file    Lack of Transportation (Non-Medical):  Not on file   Physical Activity:     Days of Exercise per Week: Not on file    Minutes of Exercise per Session: Not on file   Stress:     Feeling of Stress : Not on file   Social Connections:     Frequency of Communication with Friends and Family: Not on file    Frequency of Social Gatherings with Friends and Family: Not on file    Attends Zoroastrian Services: Not on file    Active Member of [address] [place] [address] [employer] or Organizations: Not on file    Attends Club or Organization Meetings: Not on file    Marital Status: Not on file   Intimate Partner Violence:     Fear of Current or Ex-Partner: Not on file    Emotionally Abused: Not on file    Physically Abused: Not on file    Sexually Abused: Not on file   Housing Stability:     Unable to Pay for Housing in the Last Year: Not on file    Number of [place] in the Last Year: Not on file    Unstable Housing in the Last Year: Not on file         Current Outpatient Medications:     sertraline (ZOLOFT) 25 MG tablet, Take 1 tablet by mouth daily, Disp: 30 tablet, Rfl: 3    Prenatal Vit-Fe Fumarate-FA (PRENATAL VITAMIN PO), Take 1 tablet by mouth daily, Disp: , Rfl:     ferrous sulfate (IRON 325) 325 (65 Fe) MG tablet, Take 1 tablet by mouth 2 times daily, Disp: 60 tablet, Rfl: 5    ibuprofen (ADVIL;MOTRIN) 800 MG tablet, Take 1 tablet by mouth every 6 hours as needed for Pain (Patient not taking: Reported on 4/11/2022), Disp: 90 tablet, Rfl: 0    Allergies   Allergen Reactions    Codeine     Lactose Intolerance (Gi)        Ht 5' 9\" (1.753 m)   Wt 176 lb (79.8 kg)   LMP 07/01/2021 (Within Weeks)   Breastfeeding Yes   BMI 25.99 kg/m²   Physical Exam  Constitutional:       General: She is not in acute distress. Appearance: She is well-developed. She is not diaphoretic. HENT:      Head: Normocephalic and atraumatic. Hair is normal.      Right Ear: Hearing and external ear normal. No decreased hearing noted. Left Ear: Hearing and external ear normal. No decreased hearing noted.       Nose: Nose normal. No rhinorrhea. Mouth/Throat:      Dentition: Normal dentition. Eyes:      General:         Right eye: No discharge. Left eye: No discharge. Conjunctiva/sclera: Conjunctivae normal.      Pupils: Pupils are equal, round, and reactive to light. Pulmonary:      Effort: Pulmonary effort is normal. No respiratory distress. Musculoskeletal:         General: No deformity. Normal range of motion. Cervical back: Normal range of motion. Skin:     General: Skin is warm and dry. Coloration: Skin is not pale. Findings: No rash. Neurological:      Mental Status: She is alert and oriented to person, place, and time. Cranial Nerves: No cranial nerve deficit. Psychiatric:         Attention and Perception: Attention and perception normal.         Mood and Affect: Affect is labile. Speech: Speech normal.         Behavior: Behavior normal.         Thought Content: Thought content normal.         Cognition and Memory: Cognition and memory normal.         Judgment: Judgment normal.               Assessment   Diagnosis Orders   1. 2 weeks postpartum follow-up     2. Encounter for screening for maternal depression     3. Postpartum care following vaginal delivery     4. Postpartum depression  sertraline (ZOLOFT) 25 MG tablet       Plan     1. May gradually resume ADL's  2. Discussed postpartum depression, discussed possible medication to treat. Pt wishes to try some medication. 3. Zoloft 25 mg daily  4. RTC 2 weeks VV  I Shauna Severs, am scribing for and in the presence of Dr. Jesse Dixon. I, Dr. Jesse Dixon, personally performed the services described in this documentation as scribed by Shauna Severs in my presence, and it is both accurate and complete.

## 2022-04-11 NOTE — PROGRESS NOTES
The patient returns for her 2 week post-partum visit. All information below was reviewed with her. Visit Reason:  Post-Partum Visit       Baby's Name:  Darrell George       Delivery Date:  3/28/2022       Type of Delivery:  Vaginal       Feeding: breast milk and formula       LMP:  07/01/2021       Contraceptive Choices:        Last PAP:  10/07/2021       Depression: Pt states \"I guess\"     Problems:  Pt scored a 15 on her OB depressions screening.

## 2022-04-25 ENCOUNTER — TELEMEDICINE (OUTPATIENT)
Dept: OBGYN CLINIC | Age: 42
End: 2022-04-25
Payer: MEDICAID

## 2022-04-25 PROCEDURE — 99213 OFFICE O/P EST LOW 20 MIN: CPT | Performed by: OBSTETRICS & GYNECOLOGY

## 2022-04-25 ASSESSMENT — ENCOUNTER SYMPTOMS
EYES NEGATIVE: 1
GASTROINTESTINAL NEGATIVE: 1
RESPIRATORY NEGATIVE: 1

## 2022-04-25 NOTE — PATIENT INSTRUCTIONS
death, such as writing suicide notes or talking about guns, knives, or pills. Keep the numbers for these national suicide hotlines: 8-017-454-TALK (8-515.752.4606) and 3-085-OEOWHBQ (6-849.282.3232). If you or someone you know talks about suicide or feeling hopeless, get help right away. How can you care for yourself at home?  Be safe with medicines. Take your medicines exactly as prescribed. Call your doctor if you think you are having a problem with your medicine.  Eat a healthy diet so that you can keep up your energy.  Get regular daily exercise, such as walks, to help improve your mood.  Get as much sunlight as possible. Keep your shades and curtains open. Get outside as much as you can.  Avoid using alcohol or other substances to feel better.  Get as much rest and sleep as possible. Avoid doing too much. Being too tired can increase depression.  Play stimulating music throughout your day and soothing music at night.  Schedule outings and visits with friends and family. Ask them to call you regularly, so that you don't feel alone.  Ask for help with preparing food and other daily tasks. Family and friends are often happy to help with a .  Be honest with yourself and those who care about you. Tell them about your struggle.  Join a support group of new mothers. No one can better understand the challenges of caring for a  than other new mothers.  If you feel like life is not worth living or you're feeling hopeless, get help right away. Keep the numbers for these national suicide hotlines: -TALK (3-307.843.2178) and 0-941-STRSUUJ (9-522.349.3701). When should you call for help? Call 911 anytime you think you may need emergency care. For example, call if:     You feel you cannot stop from hurting yourself, your baby, or someone else.    Call your doctor now or seek immediate medical care if:     You are having trouble caring for yourself or your baby.      You hear voices. Watch closely for changes in your health, and be sure to contact your doctor if:     You have problems with your depression medicine.      You do not get better as expected. Where can you learn more? Go to https://[employer] Networkspecarleeeweb.[email or web address]. org and sign in to your [employer] account. Enter E963 in the [employer] box to learn more about \"Depression After Childbirth: Care Instructions. \"     If you do not have an account, please click on the \"Sign Up Now\" link. Current as of: June 16, 2021               Content Version: 13.2  © 9775-2225 Healthwise, Incorporated. Care instructions adapted under license by [facility] ([facility]). If you have questions about a medical condition or this instruction, always ask your healthcare professional. [address] any warranty or liability for your use of this information.

## 2022-04-25 NOTE — PROGRESS NOTES
2022    TELEHEALTH EVALUATION -- Audio/Visual (During SRGHX-20 public health emergency)    HPI:    German Norris (:  1980) has requested an audio/video evaluation for the following concern(s):    Pt presents today for her 4 week postpartum visit following a vaginal delivery on 3-28-22. She was started on Zoloft 25 mg for postpartum depression. Review of Systems   Constitutional: Negative. HENT: Negative. Eyes: Negative. Respiratory: Negative. Cardiovascular: Negative. Gastrointestinal: Negative. Genitourinary: Negative. Negative for dysuria, frequency, menstrual problem, pelvic pain, urgency and vaginal discharge. Skin: Negative. Neurological: Negative. Psychiatric/Behavioral: Negative. Past Medical History:   Diagnosis Date    Anemia     GBS carrier     Uterine fibroid        No past surgical history on file. Family History   Problem Relation Age of Onset    Colon Cancer Maternal Aunt     Cancer Paternal Uncle         melanoma     Cancer Maternal Grandmother         uterine/ovarian       Social History     Tobacco Use    Smoking status: Never Smoker    Smokeless tobacco: Never Used   Substance Use Topics    Alcohol use: No    Drug use: Never       Prior to Visit Medications    Medication Sig Taking?  Authorizing Provider   sertraline (ZOLOFT) 25 MG tablet Take 1 tablet by mouth daily  Garo Abarca MD   ibuprofen (ADVIL;MOTRIN) 800 MG tablet Take 1 tablet by mouth every 6 hours as needed for Pain  Patient not taking: Reported on 2022  EDELMIRA Samuel   Prenatal Vit-Fe Fumarate-FA (PRENATAL VITAMIN PO) Take 1 tablet by mouth daily  Historical Provider, MD   ferrous sulfate (IRON 325) 325 (65 Fe) MG tablet Take 1 tablet by mouth 2 times daily  EDELMIRA Sexton       Allergies   Allergen Reactions    Codeine     Lactose Intolerance (Gi)              PHYSICAL EXAMINATION  Physical Exam  Constitutional:       General: She is not in acute distress. Appearance: Normal appearance. She is not ill-appearing or diaphoretic. HENT:      Head: Normocephalic and atraumatic. Nose: Nose normal. No rhinorrhea. Eyes:      General: No scleral icterus. Right eye: No discharge. Left eye: No discharge. Extraocular Movements: Extraocular movements intact. Pulmonary:      Effort: Pulmonary effort is normal. No respiratory distress. Musculoskeletal:         General: Normal range of motion. Skin:     Coloration: Skin is not pale. Findings: No erythema or rash. Neurological:      Mental Status: She is alert and oriented to person, place, and time. Psychiatric:         Attention and Perception: Attention and perception normal.         Mood and Affect: Mood and affect normal.         Speech: Speech normal.         Behavior: Behavior normal. Behavior is cooperative. Thought Content: Thought content normal.         Cognition and Memory: Cognition and memory normal.         Judgment: Judgment normal.           ASSESSMENT   Diagnosis Orders   1. Postpartum care following vaginal delivery     2. Postpartum depression         PLAN  1. Continue current dose of Zoloft. Precautions reviewed  2. Return in 2 wks for final PP visit  I Maira Grant, am scribing for and in the presence of Dr. Osvaldo Don. I, Dr. Osvaldo Don, personally performed the services described in this documentation as scribed by Maira Grant in my presence, and it is both accurate and complete. Darvin Rhoades is a 39 y.o. female being evaluated by a Virtual Visit (video visit) encounter to address concerns as mentioned above. A caregiver was present when appropriate. Due to this being a TeleHealth encounter (During [facility]- public [facility] emergency), evaluation of the following organ systems was limited: Vitals/Constitutional/EENT/Resp/CV/GI//MS/Neuro/Skin/Heme-Lymph-Imm.   Pursuant to the emergency declaration under the [facility] 78[address] waiver authority and the [employer] Act, this Virtual Visit was conducted with patient's (and/or legal guardian's) consent, to reduce the patient's risk of exposure to COVID-19 and provide necessary medical care. The patient (and/or legal guardian) has also been advised to contact this office for worsening conditions or problems, and seek emergency medical treatment and/or call 911 if deemed necessary. Patient identification was verified at the start of the visit: Yes    Total time spent on this encounter: Not billed by time    Services were provided through a video synchronous discussion virtually to substitute for in-person clinic visit. Patient and provider were located at their individual homes. --Aime Shipley RN on 4/25/2022 at 8:42 AM    An electronic signature was used to authenticate this note.

## 2022-05-09 ENCOUNTER — POSTPARTUM VISIT (OUTPATIENT)
Dept: OBGYN CLINIC | Age: 42
End: 2022-05-09
Payer: MEDICAID

## 2022-05-09 VITALS
DIASTOLIC BLOOD PRESSURE: 77 MMHG | BODY MASS INDEX: 25.77 KG/M2 | WEIGHT: 174 LBS | SYSTOLIC BLOOD PRESSURE: 128 MMHG | HEIGHT: 69 IN | HEART RATE: 51 BPM

## 2022-05-09 DIAGNOSIS — Z30.09 CONTRACEPTIVE USE EDUCATION: ICD-10-CM

## 2022-05-09 PROCEDURE — 99213 OFFICE O/P EST LOW 20 MIN: CPT | Performed by: NURSE PRACTITIONER

## 2022-05-09 RX ORDER — MEDROXYPROGESTERONE ACETATE 150 MG/ML
150 INJECTION, SUSPENSION INTRAMUSCULAR
Qty: 1 ML | Refills: 3 | Status: SHIPPED | OUTPATIENT
Start: 2022-05-09

## 2022-05-09 ASSESSMENT — ENCOUNTER SYMPTOMS
DIARRHEA: 0
CONSTIPATION: 0
RESPIRATORY NEGATIVE: 1
GASTROINTESTINAL NEGATIVE: 1
ALLERGIC/IMMUNOLOGIC NEGATIVE: 1
EYES NEGATIVE: 1

## 2022-05-09 NOTE — PATIENT INSTRUCTIONS
Patient Education        Vaginal Childbirth: Care Instructions  Overview     Vaginal birth means delivering a baby through the birth canal (vagina). During labor, the uterus tightens (contracts) regularly to thin and open the cervixand to push the baby out through the birth canal.  Your body will slowly heal in the next few weeks. It's easy to get too tired and overwhelmed during the first weeks after your baby is born. Changes in your hormones can shift your mood without warning. You may find it hard to meet theextra demands on your energy and time. Take it easy on yourself. Follow-up care is a key part of your treatment and safety. Be sure to make and go to all appointments, and call your doctor if you are having problems. It's also a good idea to know your test results and keep alist of the medicines you take. How can you care for yourself at home? Vaginal bleeding and cramps   After delivery, you will have a bloody discharge from your vagina. This will turn pink within a week and then white or yellow after about 10 days. It may last for 2 to 4 weeks or longer, until the uterus has healed. Use sanitary pads until you stop bleeding. Using pads makes it easier to monitor your bleeding.  Don't worry if you pass some blood clots, as long as they are smaller than a golf ball. If you have a tear or stitches in your vaginal area, change the pad at least every 4 hours. This will help prevent soreness and infection.  You may have cramps for the first few days after childbirth. These are normal and occur as the uterus shrinks to normal size. Take an over-the-counter pain medicine, such as acetaminophen (Tylenol), ibuprofen (Advil, Motrin), or naproxen (Aleve), for cramps. Read and follow all instructions on the label. Do not take aspirin, because it can cause more bleeding.  Do not take two or more pain medicines at the same time unless the doctor told you to.  Many pain medicines have acetaminophen, which is Tylenol. Too much acetaminophen (Tylenol) can be harmful. Stitches   If you have stitches, they will dissolve on their own and don't need to be removed. Follow your doctor's instructions for cleaning the stitched area.  Put ice or a cold pack on your painful area for 10 to 20 minutes at a time, several times a day, for the first few days. Put a thin cloth between the ice and your skin.  Sit in a few inches of warm water (sitz bath) 3 times a day and after bowel movements. The warm water helps with pain and itching. If you don't have a tub, a warm shower might help. Breast fullness   Your breasts may overfill (engorge) in the first few days after delivery. To help milk flow and to relieve pain, warm your breasts in the shower or by using warm, moist towels before nursing.  If you aren't nursing, don't put warmth on your breasts or touch your breasts. Wear a bra that fits well and use ice until the fullness goes away. This usually takes 2 to 3 days.  Put ice or a cold pack on your breast after nursing to reduce swelling and pain. Put a thin cloth between the ice and your skin. Activity   Eat a balanced diet. Don't try to lose weight by cutting calories. Keep taking your prenatal vitamins, or take a multivitamin.  Get as much rest as you can. Try to take naps when your baby sleeps during the day.  Get some exercise every day. But don't do any heavy exercise until your doctor says it is okay.  Wait until you are healed (about 4 to 6 weeks) before you have sexual intercourse. Your doctor will tell you when it is okay to have sex.  If you don't want to get pregnant, talk to your doctor about birth control. You can get pregnant even before your period returns. Also, you can get pregnant while you are breastfeeding. Mental health   It's normal to have some sadness, anxiety, sleeplessness, and mood swings after you go home.  If you feel upset or hopeless for more than a few days or are having trouble doing the things you need to do, talk to your doctor. Constipation and hemorrhoids   Drink plenty of fluids. If you have kidney, heart, or liver disease and have to limit fluids, talk with your doctor before you increase the amount of fluids you drink.  Eat plenty of fiber each day. Have a bran muffin or bran cereal for breakfast. Try eating a piece of fruit for a mid-afternoon snack.  For painful, itchy hemorrhoids, put ice or a cold pack on the area several times a day for 10 minutes at a time. Follow this by putting a warm compress on the area for another 10 to 20 minutes or by sitting in a shallow, warm bath. When should you call for help? Share this information with your partner, family, or a friend. They can help you watch for warning signs. Call 911 anytime you think you may need emergency care. For example, call if:     You have thoughts of harming yourself, your baby, or another person.      You passed out (lost consciousness).      You have chest pain, are short of breath, or cough up blood.      You have a seizure. Call your doctor now or seek immediate medical care if:     You have signs of hemorrhage (too much bleeding), such as:  ? Heavy vaginal bleeding. This means that you are soaking through one or more pads in an hour. Or you pass blood clots bigger than an egg. ? Feeling dizzy or lightheaded, or you feel like you may faint. ? Feeling so tired or weak that you cannot do your usual activities. ? A fast or irregular heartbeat. ? New or worse belly pain.      You have signs of infection, such as:  ? A fever. ? Vaginal discharge that smells bad.  ? New or worse belly pain.      You have symptoms of a blood clot in your leg (called a deep vein thrombosis), such as:  ? Pain in the calf, back of the knee, thigh, or groin. ? Redness and swelling in your leg or groin.      You have signs of preeclampsia, such as:  ? Sudden swelling of your face, hands, or feet.   ? New vision problems (such as dimness, blurring, or seeing spots). ? A severe headache. Watch closely for changes in your health, and be sure to contact your doctor if:     Your vaginal bleeding isn't decreasing.      You feel sad, anxious, or hopeless for more than a few days.      You are having problems with your breasts or breastfeeding. Where can you learn more? Go to [email or web address] and sign in to your Pickie account. Enter P848 in the [employer] box to learn more about \"Vaginal Childbirth: Care Instructions. \"     If you do not have an account, please click on the \"Sign Up Now\" link. Current as of: June 16, 2021               Content Version: 13.2  © 2006-2022 Healthwise, Incorporated. Care instructions adapted under license by [facility] ([facility]). If you have questions about a medical condition or this instruction, always ask your healthcare professional. [place] [address] any warranty or liability for your use of this information.

## 2022-05-09 NOTE — PROGRESS NOTES
Olinda Sarkar is a 39 y.o. female who presents today for her medical conditions/ complaints as noted below. Olinda Sarkar is c/o of Postpartum Care        HPI  Pt presents for 6 week PP visit. Ready to go back to work. Denies any problems with pain or bleeding. Unsure about birth control- would like to discuss with her fiance. Scored 16 on depression screening but does not want to increase the Zoloft. Denies any SI. Reports regular bowel and bladder function. No LMP recorded. [id number]    Past Medical History:   Diagnosis Date    Anemia     GBS carrier     Uterine fibroid      History reviewed. No pertinent surgical history. Family History   Problem Relation Age of Onset    Colon Cancer Maternal Aunt     Cancer Paternal Uncle         melanoma     Cancer Maternal Grandmother         uterine/ovarian     Social History     Tobacco Use    Smoking status: Never Smoker    Smokeless tobacco: Never Used   Substance Use Topics    Alcohol use: No       Current Outpatient Medications   Medication Sig Dispense Refill    sertraline (ZOLOFT) 25 MG tablet Take 1 tablet by mouth daily 30 tablet 3    Prenatal Vit-Fe Fumarate-FA (PRENATAL VITAMIN PO) Take 1 tablet by mouth daily      ferrous sulfate (IRON 325) 325 (65 Fe) MG tablet Take 1 tablet by mouth 2 times daily 60 tablet 5     No current facility-administered medications for this visit. Allergies   Allergen Reactions    Codeine     Lactose Intolerance (Gi)      Vitals:    05/09/22 1032   BP: 128/77   Pulse: 51     Body mass index is 25.7 kg/m². Review of Systems   Constitutional: Negative. HENT: Negative. Eyes: Negative. Respiratory: Negative. Cardiovascular: Negative. Gastrointestinal: Negative. Negative for constipation and diarrhea. Endocrine: Negative. Genitourinary: Negative. Negative for frequency, menstrual problem and urgency. Musculoskeletal: Negative. Skin: Negative. Allergic/Immunologic: Negative. Neurological: Negative. Hematological: Negative. Psychiatric/Behavioral: Negative. All other systems reviewed and are negative. Physical Exam  Vitals and nursing note reviewed. Constitutional:       Appearance: She is well-developed. HENT:      Head: Normocephalic. Right Ear: External ear normal.      Left Ear: External ear normal.      Nose: Nose normal.   Musculoskeletal:         General: Normal range of motion. Cervical back: Normal range of motion. Skin:     General: Skin is warm and dry. Neurological:      Mental Status: She is alert and oriented to person, place, and time. Psychiatric:         Attention and Perception: Attention normal.         Mood and Affect: Mood normal.         Speech: Speech normal.         Behavior: Behavior normal.         Thought Content: Thought content normal.         Cognition and Memory: Cognition normal.         Judgment: Judgment normal.          Diagnosis Orders   1. Postpartum care following vaginal delivery     2. 6 weeks postpartum follow-up         MEDICATIONS:  No orders of the defined types were placed in this encounter. ORDERS:  No orders of the defined types were placed in this encounter. PLAN:  Discussed birth control options with pumping- interested in depo shot- called in to pharmacy and can bring back for injection  Can continue Zoloft and adjust as pt needs  Ok to return to normal ADLs    Patient Instructions     Patient Education        Vaginal Childbirth: Care Instructions  Overview     Vaginal birth means delivering a baby through the birth canal (vagina). During labor, the uterus tightens (contracts) regularly to thin and open the cervixand to push the baby out through the birth canal.  Your body will slowly heal in the next few weeks. It's easy to get too tired and overwhelmed during the first weeks after your baby is born. Changes in your hormones can shift your mood without warning.  You may find it hard to meet theextra demands on your energy and time. Take it easy on yourself. Follow-up care is a key part of your treatment and safety. Be sure to make and go to all appointments, and call your doctor if you are having problems. It's also a good idea to know your test results and keep alist of the medicines you take. How can you care for yourself at home? Vaginal bleeding and cramps   After delivery, you will have a bloody discharge from your vagina. This will turn pink within a week and then white or yellow after about 10 days. It may last for 2 to 4 weeks or longer, until the uterus has healed. Use sanitary pads until you stop bleeding. Using pads makes it easier to monitor your bleeding.  Don't worry if you pass some blood clots, as long as they are smaller than a golf ball. If you have a tear or stitches in your vaginal area, change the pad at least every 4 hours. This will help prevent soreness and infection.  You may have cramps for the first few days after childbirth. These are normal and occur as the uterus shrinks to normal size. Take an over-the-counter pain medicine, such as acetaminophen (Tylenol), ibuprofen (Advil, Motrin), or naproxen (Aleve), for cramps. Read and follow all instructions on the label. Do not take aspirin, because it can cause more bleeding.  Do not take two or more pain medicines at the same time unless the doctor told you to. Many pain medicines have acetaminophen, which is Tylenol. Too much acetaminophen (Tylenol) can be harmful. Stitches   If you have stitches, they will dissolve on their own and don't need to be removed. Follow your doctor's instructions for cleaning the stitched area.  Put ice or a cold pack on your painful area for 10 to 20 minutes at a time, several times a day, for the first few days. Put a thin cloth between the ice and your skin.  Sit in a few inches of warm water (sitz bath) 3 times a day and after bowel movements.  The warm water helps with pain and itching. If you don't have a tub, a warm shower might help. Breast fullness   Your breasts may overfill (engorge) in the first few days after delivery. To help milk flow and to relieve pain, warm your breasts in the shower or by using warm, moist towels before nursing.  If you aren't nursing, don't put warmth on your breasts or touch your breasts. Wear a bra that fits well and use ice until the fullness goes away. This usually takes 2 to 3 days.  Put ice or a cold pack on your breast after nursing to reduce swelling and pain. Put a thin cloth between the ice and your skin. Activity   Eat a balanced diet. Don't try to lose weight by cutting calories. Keep taking your prenatal vitamins, or take a multivitamin.  Get as much rest as you can. Try to take naps when your baby sleeps during the day.  Get some exercise every day. But don't do any heavy exercise until your doctor says it is okay.  Wait until you are healed (about 4 to 6 weeks) before you have sexual intercourse. Your doctor will tell you when it is okay to have sex.  If you don't want to get pregnant, talk to your doctor about birth control. You can get pregnant even before your period returns. Also, you can get pregnant while you are breastfeeding. Mental health   It's normal to have some sadness, anxiety, sleeplessness, and mood swings after you go home. If you feel upset or hopeless for more than a few days or are having trouble doing the things you need to do, talk to your doctor. Constipation and hemorrhoids   Drink plenty of fluids. If you have kidney, heart, or liver disease and have to limit fluids, talk with your doctor before you increase the amount of fluids you drink.  Eat plenty of fiber each day. Have a bran muffin or bran cereal for breakfast. Try eating a piece of fruit for a mid-afternoon snack.  For painful, itchy hemorrhoids, put ice or a cold pack on the area several times a day for 10 minutes at a time.  Follow this by putting a warm compress on the area for another 10 to 20 minutes or by sitting in a shallow, warm bath. When should you call for help? Share this information with your partner, family, or a friend. They can help you watch for warning signs. Call 911 anytime you think you may need emergency care. For example, call if:     You have thoughts of harming yourself, your baby, or another person.      You passed out (lost consciousness).      You have chest pain, are short of breath, or cough up blood.      You have a seizure. Call your doctor now or seek immediate medical care if:     You have signs of hemorrhage (too much bleeding), such as:  ? Heavy vaginal bleeding. This means that you are soaking through one or more pads in an hour. Or you pass blood clots bigger than an egg. ? Feeling dizzy or lightheaded, or you feel like you may faint. ? Feeling so tired or weak that you cannot do your usual activities. ? A fast or irregular heartbeat. ? New or worse belly pain.      You have signs of infection, such as:  ? A fever. ? Vaginal discharge that smells bad.  ? New or worse belly pain.      You have symptoms of a blood clot in your leg (called a deep vein thrombosis), such as:  ? Pain in the calf, back of the knee, thigh, or groin. ? Redness and swelling in your leg or groin.      You have signs of preeclampsia, such as:  ? Sudden swelling of your face, hands, or feet. ? New vision problems (such as dimness, blurring, or seeing spots). ? A severe headache. Watch closely for changes in your health, and be sure to contact your doctor if:     Your vaginal bleeding isn't decreasing.      You feel sad, anxious, or hopeless for more than a few days.      You are having problems with your breasts or breastfeeding. Where can you learn more? Go to https://chkseniaeb.[email or web address] org and sign in to your Fluidigm account.  Enter W665 in the pinnacle-ecs box to learn more about \"Vaginal Childbirth: Care Instructions. \"     If you do not have an account, please click on the \"Sign Up Now\" link. Current as of: June 16, 2021               Content Version: 13.2  © 1991-2247 Healthwise, Incorporated. Care instructions adapted under license by [facility] ([facility]). If you have questions about a medical condition or this instruction, always ask your healthcare professional. [address] any warranty or liability for your use of this information.

## 2022-05-09 NOTE — PROGRESS NOTES
Pt is here for her 6 wk pp check. She states she is doing ok from having baby. She is wanting to go back to work. The patient returns for her post-partum visit. All information below was reviewed with her. Visit Reason:  Post-Partum Visit       Baby's Name:  Natalia Baker        Delivery Date:  03/28/2022       Type of Delivery:  Vaginal        Feeding: bottle        LMP:  Has not restarted       Contraceptive Choices: NA        Last PAP:  2022 per pt        Depression: scored 16 on depression screenign.

## 2022-05-12 ENCOUNTER — NURSE ONLY (OUTPATIENT)
Dept: OBGYN CLINIC | Age: 42
End: 2022-05-12
Payer: MEDICAID

## 2022-05-12 DIAGNOSIS — Z30.42 ENCOUNTER FOR DEPO-PROVERA CONTRACEPTION: Primary | ICD-10-CM

## 2022-05-12 LAB
CONTROL: NORMAL
PREGNANCY TEST URINE, POC: NORMAL

## 2022-05-12 PROCEDURE — 81025 URINE PREGNANCY TEST: CPT | Performed by: NURSE PRACTITIONER

## 2022-05-12 PROCEDURE — 96372 THER/PROPH/DIAG INJ SC/IM: CPT | Performed by: NURSE PRACTITIONER

## 2022-05-12 RX ORDER — MEDROXYPROGESTERONE ACETATE 150 MG/ML
150 INJECTION, SUSPENSION INTRAMUSCULAR ONCE
Status: COMPLETED | OUTPATIENT
Start: 2022-05-12 | End: 2022-05-12

## 2022-05-12 RX ADMIN — MEDROXYPROGESTERONE ACETATE 150 MG: 150 INJECTION, SUSPENSION INTRAMUSCULAR at 09:40

## 2022-05-12 NOTE — PROGRESS NOTES
After obtaining consent, and per orders of Dr. Tristan Toure, injection of Depo given in Left deltoid by Benita Lee MA. Patient instructed to remain in clinic for 20 minutes afterwards, and to report any adverse reaction to me immediately.

## 2022-05-15 ENCOUNTER — TELEPHONE (OUTPATIENT)
Dept: EMERGENCY DEPT | Facility: HOSPITAL | Age: 42
End: 2022-05-15

## 2022-05-15 ENCOUNTER — HOSPITAL ENCOUNTER (EMERGENCY)
Facility: HOSPITAL | Age: 42
Discharge: HOME | End: 2022-05-15
Attending: EMERGENCY MEDICINE | Admitting: EMERGENCY MEDICINE

## 2022-05-15 VITALS
SYSTOLIC BLOOD PRESSURE: 118 MMHG | BODY MASS INDEX: 25.77 KG/M2 | HEART RATE: 68 BPM | TEMPERATURE: 98.6 F | WEIGHT: 174 LBS | RESPIRATION RATE: 16 BRPM | DIASTOLIC BLOOD PRESSURE: 91 MMHG | HEIGHT: 69 IN | OXYGEN SATURATION: 99 %

## 2022-05-15 DIAGNOSIS — G51.0 BELL'S PALSY: Primary | ICD-10-CM

## 2022-05-15 LAB — SARS-COV-2 RNA PNL SPEC NAA+PROBE: DETECTED

## 2022-05-15 PROCEDURE — 87635 SARS-COV-2 COVID-19 AMP PRB: CPT | Performed by: EMERGENCY MEDICINE

## 2022-05-15 PROCEDURE — C9803 HOPD COVID-19 SPEC COLLECT: HCPCS | Performed by: EMERGENCY MEDICINE

## 2022-05-15 PROCEDURE — 99283 EMERGENCY DEPT VISIT LOW MDM: CPT

## 2022-05-15 RX ORDER — PREDNISONE 10 MG/1
80 TABLET ORAL DAILY
Qty: 56 TABLET | Refills: 0 | Status: SHIPPED | OUTPATIENT
Start: 2022-05-15 | End: 2022-05-22

## 2022-05-15 NOTE — TELEPHONE ENCOUNTER
Called patient to discuss positive COVID testing.  Discussed need for continued PCP follow-up for reevaluation, strict return precautions.  Patient educated on symptomatic treatment.  Patient with no further questions, concerns, needs at this time.

## 2022-05-18 ENCOUNTER — TELEMEDICINE (OUTPATIENT)
Dept: FAMILY MEDICINE CLINIC | Facility: CLINIC | Age: 42
End: 2022-05-18

## 2022-05-18 VITALS — BODY MASS INDEX: 25.77 KG/M2 | WEIGHT: 174 LBS | HEIGHT: 69 IN

## 2022-05-18 DIAGNOSIS — E66.3 OVERWEIGHT (BMI 25.0-29.9): ICD-10-CM

## 2022-05-18 DIAGNOSIS — G51.0 BELL'S PALSY: ICD-10-CM

## 2022-05-18 DIAGNOSIS — U07.1 COVID: Primary | ICD-10-CM

## 2022-05-18 PROCEDURE — 99213 OFFICE O/P EST LOW 20 MIN: CPT | Performed by: NURSE PRACTITIONER

## 2022-05-18 NOTE — PROGRESS NOTES
"This was an audio and video enabled telemedicine encounter. Patient verbally consented to visit. Patient was located at Home and I was located at [facility]  location.     Chief Complaint  covid  and bells palsy    Subjective    History of Present Illness      Patient presents to [facility] for   Pt here today with c/o left facial drooping and cough. States she was seen in the ER for this on Sunday.        Review of Systems   Constitutional: Negative.    HENT: Negative.    Eyes: Negative.    Respiratory: Positive for cough.    Cardiovascular: Negative.    Gastrointestinal: Negative.    Endocrine: Negative.    Genitourinary: Negative.    Musculoskeletal: Negative.    Skin: Negative.    Allergic/Immunologic: Negative.    Neurological: Positive for facial asymmetry.   Hematological: Negative.    Psychiatric/Behavioral: Negative.        I have reviewed and agree with the HPI and RUST information as above.  Marine Concepcion, APRN     Objective   Vital Signs:   Ht 175.3 cm (69\")   Wt 78.9 kg (174 lb)   BMI 25.70 kg/m²           Physical Exam  Vitals and nursing note reviewed.   Constitutional:       Appearance: Normal appearance. She is well-developed.      Comments: overweight   HENT:      Head: Normocephalic and atraumatic.      Mouth/Throat:      Lips: Pink. No lesions.   Eyes:      General: Lids are normal. Vision grossly intact.      Conjunctiva/sclera: Conjunctivae normal.      Right eye: Right conjunctiva is not injected.      Left eye: Left conjunctiva is not injected.   Pulmonary:      Effort: Pulmonary effort is normal.   Musculoskeletal:         General: Normal range of motion.      Cervical back: Full passive range of motion without pain, normal range of motion and neck supple.   Skin:     General: Skin is dry.   Neurological:      Mental Status: She is alert and oriented to person, place, and time.      Cranial Nerves: Facial asymmetry present.      Motor: Motor " function is intact.      Comments: Left facial droop   Psychiatric:         Mood and Affect: Mood and affect normal.         Judgment: Judgment normal.          FELIPE-7:      PHQ-2 Depression Screening  Little interest or pleasure in doing things?     Feeling down, depressed, or hopeless?     PHQ-2 Total Score       PHQ-9 Depression Screening  Little interest or pleasure in doing things?     Feeling down, depressed, or hopeless?     Trouble falling or staying asleep, or sleeping too much?     Feeling tired or having little energy?     Poor appetite or overeating?     Feeling bad about yourself - or that you are a failure or have let yourself or your family down?     Trouble concentrating on things, such as reading the newspaper or watching television?     Moving or speaking so slowly that other people could have noticed? Or the opposite - being so fidgety or restless that you have been moving around a lot more than usual?     Thoughts that you would be better off dead, or of hurting yourself in some way?     PHQ-9 Total Score     If you checked off any problems, how difficult have these problems made it for you to do your work, take care of things at home, or get along with other people?        Result Review  Data Reviewed:   The following data was reviewed by: CHESTER Lance on 05/18/2022:    COVID-19,[facility] IN-HOUSE,Nasal Swab No Transport Media 3-4 HR TAT - Swab, Nasal Cavity (05/15/2022 09:20)  ED with Domenic Jackson MD (05/15/2022)           Assessment and Plan      Problem List Items Addressed This Visit    None     Visit Diagnoses     COVID    -  Primary    Bell's palsy        Overweight (BMI 25.0-29.9)            Patient being seen through telehealth today with complaint of left facial drooping.  She also reports having a COVID positive test.  She states on Saturday she took her daughter and has been to [facility] and they were both diagnosed COVID-positive.  Her  did note that  her face seemed to be drooping slightly, but patient brushed off his comment due to being worried about her daughter.  She woke up on Sunday morning and the left side of her face was drooping worse.  She went to [facility] urgent care initially, but was then sent to [facility] ER.  She was diagnosed at that point with Bell's palsy.  She was also swabbed for COVID and was positive as well.  She was given oral steroids at that time and was sent home.  Patient is questioning if she needs to be treated for COVID with any particular medications.  Instructed patient to continue oral steroids.  Recommended to treat symptoms of COVID with over-the-counter medications for cough, sinus congestion, and fever.  Encouraged to rest and hydrate.  I did offer patient a CT scan, although symptoms present as Bell's palsy.  She declines at this time.  Follow-up if symptoms do not improve or become worse.      Follow Up   Return if symptoms worsen or fail to improve.  Patient was given instructions and counseling regarding her condition or for health maintenance advice. Please see specific information pulled into the AVS if appropriate.

## 2022-05-25 ENCOUNTER — OFFICE VISIT (OUTPATIENT)
Dept: FAMILY MEDICINE CLINIC | Facility: CLINIC | Age: 42
End: 2022-05-25

## 2022-05-25 VITALS
DIASTOLIC BLOOD PRESSURE: 88 MMHG | TEMPERATURE: 98.7 F | BODY MASS INDEX: 26.22 KG/M2 | HEIGHT: 69 IN | WEIGHT: 177 LBS | RESPIRATION RATE: 20 BRPM | HEART RATE: 73 BPM | SYSTOLIC BLOOD PRESSURE: 120 MMHG

## 2022-05-25 DIAGNOSIS — R29.810 FACIAL DROOP: ICD-10-CM

## 2022-05-25 DIAGNOSIS — G51.0 BELL'S PALSY: Primary | ICD-10-CM

## 2022-05-25 DIAGNOSIS — R23.8 SKIN SENSITIVITY: ICD-10-CM

## 2022-05-25 DIAGNOSIS — H92.02 LEFT EAR PAIN: ICD-10-CM

## 2022-05-25 DIAGNOSIS — R51.9 FACIAL PAIN: Primary | ICD-10-CM

## 2022-05-25 PROBLEM — Z22.330 GBS CARRIER: Status: ACTIVE | Noted: 2022-03-11

## 2022-05-25 PROBLEM — O09.90 HIGH RISK PREGNANCY, ANTEPARTUM: Status: ACTIVE | Noted: 2021-10-08

## 2022-05-25 PROBLEM — Z86.32 HISTORY OF GESTATIONAL DIABETES: Status: ACTIVE | Noted: 2021-10-08

## 2022-05-25 PROBLEM — O09.529 MULTIGRAVIDA OF ADVANCED MATERNAL AGE: Status: ACTIVE | Noted: 2021-09-28

## 2022-05-25 PROBLEM — Z34.90 ENCOUNTER FOR ELECTIVE INDUCTION OF LABOR: Status: ACTIVE | Noted: 2022-03-27

## 2022-05-25 PROCEDURE — 99214 OFFICE O/P EST MOD 30 MIN: CPT | Performed by: NURSE PRACTITIONER

## 2022-05-25 RX ORDER — GABAPENTIN 300 MG/1
CAPSULE ORAL
Qty: 60 CAPSULE | Refills: 0 | Status: SHIPPED | OUTPATIENT
Start: 2022-05-25

## 2022-05-25 RX ORDER — VALACYCLOVIR HYDROCHLORIDE 1 G/1
1000 TABLET, FILM COATED ORAL 3 TIMES DAILY
Qty: 21 TABLET | Refills: 0 | Status: SHIPPED | OUTPATIENT
Start: 2022-05-25 | End: 2022-06-01

## 2022-05-25 RX ORDER — PREDNISONE 10 MG/1
TABLET ORAL
Qty: 18 TABLET | Refills: 0 | Status: SHIPPED | OUTPATIENT
Start: 2022-05-25 | End: 2022-11-09

## 2022-05-25 NOTE — PROGRESS NOTES
"Chief Complaint  bells palsy (Pt was recently given oral steriods at [facility] and states she is having L ear pain )    Subjective    History of Present Illness      Patient presents to [facility] for   [place] palsy follow up. Was given oral steroids in the ER and now having L ear pain and L sided frontal dull pain.        Review of Systems   HENT: Positive for ear pain.         Facial pain   All other systems reviewed and are negative.      I have reviewed and agree with the HPI and ROS information as above.  Candace Gage, CHESTER     Objective   Vital Signs:   /88   Pulse 73   Temp 98.7 °F (37.1 °C)   Resp 20   Ht 175.3 cm (69\")   Wt 80.3 kg (177 lb)   BMI 26.14 kg/m²       Physical Exam  Constitutional:       Appearance: Normal appearance. She is well-developed.   HENT:      Head:      Comments: Left sided facial droop      Right Ear: Tympanic membrane, ear canal and external ear normal.      Left Ear: Tympanic membrane, ear canal and external ear normal. Tenderness (left ear and left cheek) present.      Ears:      Comments: Fluid to left tm, no redness or swelling      Nose: Nose normal. No septal deviation, nasal tenderness or congestion.      Mouth/Throat:      Lips: Pink. No lesions.      Mouth: Mucous membranes are moist. No oral lesions.      Dentition: Normal dentition.      Pharynx: Oropharynx is clear. No pharyngeal swelling, oropharyngeal exudate or posterior oropharyngeal erythema.   Eyes:      General: Lids are normal. Vision grossly intact. No scleral icterus.        Right eye: No discharge.         Left eye: No discharge.      Extraocular Movements: Extraocular movements intact.      Conjunctiva/sclera: Conjunctivae normal.      Right eye: Right conjunctiva is not injected.      Left eye: Left conjunctiva is not injected.      Pupils: Pupils are equal, round, and reactive to light.   Neck:      Thyroid: No thyroid mass.      Trachea: Trachea normal. "   Cardiovascular:      Rate and Rhythm: Normal rate and regular rhythm.      Heart sounds: Normal heart sounds. No murmur heard.    No gallop.   Pulmonary:      Effort: Pulmonary effort is normal.      Breath sounds: Normal breath sounds and air entry. No wheezing, rhonchi or rales.   Abdominal:      General: There is no distension.      Palpations: Abdomen is soft. There is no mass.      Tenderness: There is no abdominal tenderness. There is no right CVA tenderness, left CVA tenderness, guarding or rebound.   Musculoskeletal:         General: No tenderness or deformity. Normal range of motion.      Cervical back: Full passive range of motion without pain, normal range of motion and neck supple.      Thoracic back: Normal.      Right lower leg: No edema.      Left lower leg: No edema.   Skin:     General: Skin is warm and dry.      Coloration: Skin is not jaundiced.      Findings: No rash.   Neurological:      Mental Status: She is alert and oriented to person, place, and time.      Cranial Nerves: Facial asymmetry (left sided droop) present.      Sensory: Sensation is intact.      Motor: Motor function is intact.      Coordination: Coordination is intact.      Gait: Gait is intact.      Deep Tendon Reflexes: Reflexes are normal and symmetric.   Psychiatric:         Mood and Affect: Mood and affect normal.         Judgment: Judgment normal.          PHQ-2 Depression Screening  Little interest or pleasure in doing things? 0-->not at all   Feeling down, depressed, or hopeless? 0-->not at all   PHQ-2 Total Score 0     PHQ-9 Depression Screening  Little interest or pleasure in doing things? 0-->not at all   Feeling down, depressed, or hopeless? 0-->not at all   Trouble falling or staying asleep, or sleeping too much?     Feeling tired or having little energy?     Poor appetite or overeating?     Feeling bad about yourself - or that you are a failure or have let yourself or your family down?     Trouble concentrating on  things, such as reading the newspaper or watching television?     Moving or speaking so slowly that other people could have noticed? Or the opposite - being so fidgety or restless that you have been moving around a lot more than usual?     Thoughts that you would be better off dead, or of hurting yourself in some way?     PHQ-9 Total Score 0   If you checked off any problems, how difficult have these problems made it for you to do your work, take care of things at home, or get along with other people?        Result Review  Data Reviewed:                   Assessment and Plan      Problem List Items Addressed This Visit    None     Visit Diagnoses     Bell's palsy    -  Primary    Relevant Medications    predniSONE (DELTASONE) 10 MG tablet    valACYclovir (Valtrex) 1000 MG tablet    Other Relevant Orders    CT Head Without Contrast    Ambulatory Referral to ENT (Otolaryngology)    Facial droop        Relevant Medications    predniSONE (DELTASONE) 10 MG tablet    valACYclovir (Valtrex) 1000 MG tablet    Other Relevant Orders    CT Head Without Contrast    Ambulatory Referral to ENT (Otolaryngology)    Left ear pain        Relevant Medications    predniSONE (DELTASONE) 10 MG tablet    valACYclovir (Valtrex) 1000 MG tablet    Other Relevant Orders    Ambulatory Referral to ENT (Otolaryngology)    Skin sensitivity          Patient presents today for evaluation of left-sided facial and ear pain.  He went to urgent care on 5/15 and was directed to the ER where she was diagnosed with Bell's palsy and sent home on prednisone 10 mg 8 times daily x1 week.  She feels that the drooping has slightly improved there is significant drooping still noted on exam today.  But she feels like the left side of her face is significantly more sensitive and described as painful in her left ear aches as well.  Plan:  1.  CT was ordered but insurance wishes to review her case prior to authorizing.  2.  We will treat current symptoms with a  tapering dose of prednisone, cover with Valtrex.  3.  Will use gabapentin for pain.  4.  Consult ENT for facial drooping therapy discussion.Dr Vegas consulted and in agreement with plan of care.       Follow Up   Return for Recheck depending on lab/imaging results.  Patient was given instructions and counseling regarding her condition or for health maintenance advice. Please see specific information pulled into the AVS if appropriate.

## 2022-05-31 ENCOUNTER — TELEPHONE (OUTPATIENT)
Dept: FAMILY MEDICINE CLINIC | Facility: CLINIC | Age: 42
End: 2022-05-31

## 2022-05-31 NOTE — TELEPHONE ENCOUNTER
Natalia Dee called our office from [facility] Billing about a pending stat CT scan for Rison Palsy. She states [facility] ([phone]) will not approve until they do a peer review and speak with clinical staff. Case numbers  [id number]  [id number]    Called and spoke with Magy ARCOS at [facility]. She states the case is pending medical review. She states that the case was not in as urgent on there end. Informed that the CT was ordered as STAT and the patient is having significant facial drooping. Magy BEJARANO Changed the status to urgent. I asked her what is the status of the medical review and she states now that it is urgent they will review in 24-48 hours.     Called patient and informed of the information. She states it is schedule for tommorw. I informed patient that I will call the check on status again tomorrow but would not get the scan without the approval. HAILEE.

## 2022-06-01 ENCOUNTER — TELEPHONE (OUTPATIENT)
Dept: FAMILY MEDICINE CLINIC | Facility: CLINIC | Age: 42
End: 2022-06-01

## 2022-06-01 ENCOUNTER — HOSPITAL ENCOUNTER (OUTPATIENT)
Dept: CT IMAGING | Facility: HOSPITAL | Age: 42
Discharge: HOME | End: 2022-06-01
Admitting: NURSE PRACTITIONER

## 2022-06-01 DIAGNOSIS — G51.0 BELL'S PALSY: ICD-10-CM

## 2022-06-01 DIAGNOSIS — R29.810 FACIAL DROOP: ICD-10-CM

## 2022-06-01 PROCEDURE — 70450 CT HEAD/BRAIN W/O DYE: CPT

## 2022-06-01 NOTE — TELEPHONE ENCOUNTER
Called [facility] and spoke with LARRY Garsia and was informed that the CT was approved.   Called patient. She stated she had already called the insurance and they told her it was approved. CT is today at 2:00.

## 2022-06-01 NOTE — TELEPHONE ENCOUNTER
----- Message from CHESTER Fragoso sent at 6/1/2022  3:33 PM CDT -----  Please let pt know results: CT shows no acute abnormality.

## 2022-06-08 ENCOUNTER — TELEPHONE (OUTPATIENT)
Dept: OTOLARYNGOLOGY | Facility: CLINIC | Age: 42
End: 2022-06-08

## 2022-06-08 NOTE — TELEPHONE ENCOUNTER
Call placed to patient and message left requesting call back. Patient has an appointment 6/13/2022 6055.

## 2022-06-13 ENCOUNTER — HOSPITAL ENCOUNTER (INPATIENT)
Age: 42
LOS: 3 days | Discharge: HOME | DRG: 751 | End: 2022-06-17
Attending: EMERGENCY MEDICINE | Admitting: PSYCHIATRY & NEUROLOGY
Payer: MEDICAID

## 2022-06-13 DIAGNOSIS — F32.A DEPRESSION WITH SUICIDAL IDEATION: ICD-10-CM

## 2022-06-13 DIAGNOSIS — R45.851 DEPRESSION WITH SUICIDAL IDEATION: ICD-10-CM

## 2022-06-13 DIAGNOSIS — G51.0 BELL'S PALSY: Primary | ICD-10-CM

## 2022-06-13 LAB
ACETAMINOPHEN LEVEL: <15 UG/ML
ALBUMIN SERPL-MCNC: 3.9 G/DL (ref 3.5–5.2)
ALP BLD-CCNC: 46 U/L (ref 35–104)
ALT SERPL-CCNC: 13 U/L (ref 5–33)
AMPHETAMINE SCREEN, URINE: NEGATIVE
ANION GAP SERPL CALCULATED.3IONS-SCNC: 10 MMOL/L (ref 7–19)
AST SERPL-CCNC: 13 U/L (ref 5–32)
BACTERIA: ABNORMAL /HPF
BARBITURATE SCREEN URINE: NEGATIVE
BASOPHILS ABSOLUTE: 0 K/UL (ref 0–0.2)
BASOPHILS RELATIVE PERCENT: 0.5 % (ref 0–1)
BENZODIAZEPINE SCREEN, URINE: NEGATIVE
BILIRUB SERPL-MCNC: <0.2 MG/DL (ref 0.2–1.2)
BILIRUBIN URINE: NEGATIVE
BLOOD, URINE: ABNORMAL
BUN BLDV-MCNC: 11 MG/DL (ref 6–20)
CALCIUM SERPL-MCNC: 8.9 MG/DL (ref 8.6–10)
CANNABINOID SCREEN URINE: NEGATIVE
CHLORIDE BLD-SCNC: 111 MMOL/L (ref 98–111)
CLARITY: ABNORMAL
CO2: 21 MMOL/L (ref 22–29)
COCAINE METABOLITE SCREEN URINE: NEGATIVE
COLOR: ABNORMAL
CREAT SERPL-MCNC: 0.8 MG/DL (ref 0.5–0.9)
CRYSTALS, UA: ABNORMAL /HPF
EOSINOPHILS ABSOLUTE: 0.2 K/UL (ref 0–0.6)
EOSINOPHILS RELATIVE PERCENT: 3 % (ref 0–5)
EPITHELIAL CELLS, UA: ABNORMAL /HPF
ETHANOL: <10 MG/DL (ref 0–0.08)
GFR AFRICAN AMERICAN: >59
GFR NON-AFRICAN AMERICAN: >60
GLUCOSE BLD-MCNC: 109 MG/DL (ref 74–109)
GLUCOSE URINE: NEGATIVE MG/DL
HCG QUALITATIVE: NEGATIVE
HCT VFR BLD CALC: 39.6 % (ref 37–47)
HEMOGLOBIN: 12.4 G/DL (ref 12–16)
IMMATURE GRANULOCYTES #: 0.1 K/UL
KETONES, URINE: ABNORMAL MG/DL
LEUKOCYTE ESTERASE, URINE: ABNORMAL
LYMPHOCYTES ABSOLUTE: 2.1 K/UL (ref 1.1–4.5)
LYMPHOCYTES RELATIVE PERCENT: 31.7 % (ref 20–40)
Lab: NORMAL
MCH RBC QN AUTO: 29.9 PG (ref 27–31)
MCHC RBC AUTO-ENTMCNC: 31.3 G/DL (ref 33–37)
MCV RBC AUTO: 95.4 FL (ref 81–99)
MONOCYTES ABSOLUTE: 0.6 K/UL (ref 0–0.9)
MONOCYTES RELATIVE PERCENT: 8.5 % (ref 0–10)
MUCUS: ABNORMAL /LPF
NEUTROPHILS ABSOLUTE: 3.7 K/UL (ref 1.5–7.5)
NEUTROPHILS RELATIVE PERCENT: 55.5 % (ref 50–65)
NITRITE, URINE: NEGATIVE
OPIATE SCREEN URINE: NEGATIVE
PDW BLD-RTO: 15.3 % (ref 11.5–14.5)
PH UA: 5.5 (ref 5–8)
PLATELET # BLD: 221 K/UL (ref 130–400)
PMV BLD AUTO: 9.3 FL (ref 9.4–12.3)
POTASSIUM SERPL-SCNC: 3.6 MMOL/L (ref 3.5–5)
PROTEIN UA: 30 MG/DL
RBC # BLD: 4.15 M/UL (ref 4.2–5.4)
RBC UA: ABNORMAL /HPF (ref 0–2)
SALICYLATE, SERUM: <3 MG/DL (ref 3–10)
SARS-COV-2, NAAT: NOT DETECTED
SODIUM BLD-SCNC: 142 MMOL/L (ref 136–145)
SPECIFIC GRAVITY UA: 1.03 (ref 1–1.03)
TOTAL PROTEIN: 6.7 G/DL (ref 6.6–8.7)
UROBILINOGEN, URINE: 1 E.U./DL
WBC # BLD: 6.6 K/UL (ref 4.8–10.8)
WBC UA: ABNORMAL /HPF (ref 0–5)

## 2022-06-13 PROCEDURE — 80307 DRUG TEST PRSMV CHEM ANLYZR: CPT

## 2022-06-13 PROCEDURE — 82077 ASSAY SPEC XCP UR&BREATH IA: CPT

## 2022-06-13 PROCEDURE — 85025 COMPLETE CBC W/AUTO DIFF WBC: CPT

## 2022-06-13 PROCEDURE — 80179 DRUG ASSAY SALICYLATE: CPT

## 2022-06-13 PROCEDURE — 84703 CHORIONIC GONADOTROPIN ASSAY: CPT

## 2022-06-13 PROCEDURE — 81001 URINALYSIS AUTO W/SCOPE: CPT

## 2022-06-13 PROCEDURE — 80143 DRUG ASSAY ACETAMINOPHEN: CPT

## 2022-06-13 PROCEDURE — 80053 COMPREHEN METABOLIC PANEL: CPT

## 2022-06-13 PROCEDURE — 87635 SARS-COV-2 COVID-19 AMP PRB: CPT

## 2022-06-13 PROCEDURE — 36415 COLL VENOUS BLD VENIPUNCTURE: CPT

## 2022-06-13 RX ORDER — MINERAL OIL AND WHITE PETROLATUM 150; 830 MG/G; MG/G
OINTMENT OPHTHALMIC
Status: DISCONTINUED | OUTPATIENT
Start: 2022-06-13 | End: 2022-06-17 | Stop reason: HOSPADM

## 2022-06-13 ASSESSMENT — ENCOUNTER SYMPTOMS
VOMITING: 0
DIARRHEA: 0
SHORTNESS OF BREATH: 0
ABDOMINAL PAIN: 0
EYE PAIN: 0

## 2022-06-14 PROBLEM — F32.A DEPRESSION WITH SUICIDAL IDEATION: Status: ACTIVE | Noted: 2022-06-14

## 2022-06-14 PROBLEM — R45.851 DEPRESSION WITH SUICIDAL IDEATION: Status: ACTIVE | Noted: 2022-06-14

## 2022-06-14 PROBLEM — F33.2 MAJOR DEPRESSIVE DISORDER, RECURRENT, SEVERE WITHOUT PSYCHOTIC FEATURES (HCC): Status: ACTIVE | Noted: 2022-06-14

## 2022-06-14 PROCEDURE — 6370000000 HC RX 637 (ALT 250 FOR IP): Performed by: PSYCHIATRY & NEUROLOGY

## 2022-06-14 PROCEDURE — 99285 EMERGENCY DEPT VISIT HI MDM: CPT

## 2022-06-14 PROCEDURE — 90792 PSYCH DIAG EVAL W/MED SRVCS: CPT | Performed by: PSYCHIATRY & NEUROLOGY

## 2022-06-14 PROCEDURE — 1240000000 HC EMOTIONAL WELLNESS R&B

## 2022-06-14 RX ORDER — BUPROPION HYDROCHLORIDE 150 MG/1
150 TABLET ORAL DAILY
Status: DISCONTINUED | OUTPATIENT
Start: 2022-06-14 | End: 2022-06-17 | Stop reason: HOSPADM

## 2022-06-14 RX ORDER — HYDROXYZINE HYDROCHLORIDE 25 MG/1
25 TABLET, FILM COATED ORAL 3 TIMES DAILY PRN
Status: DISCONTINUED | OUTPATIENT
Start: 2022-06-14 | End: 2022-06-17 | Stop reason: HOSPADM

## 2022-06-14 RX ORDER — ACETAMINOPHEN 325 MG/1
650 TABLET ORAL EVERY 4 HOURS PRN
Status: DISCONTINUED | OUTPATIENT
Start: 2022-06-14 | End: 2022-06-17 | Stop reason: HOSPADM

## 2022-06-14 RX ORDER — TRAZODONE HYDROCHLORIDE 50 MG/1
50 TABLET ORAL NIGHTLY
Status: DISCONTINUED | OUTPATIENT
Start: 2022-06-14 | End: 2022-06-17 | Stop reason: HOSPADM

## 2022-06-14 RX ORDER — POLYETHYLENE GLYCOL 3350 17 G/17G
17 POWDER, FOR SOLUTION ORAL DAILY PRN
Status: DISCONTINUED | OUTPATIENT
Start: 2022-06-14 | End: 2022-06-17 | Stop reason: HOSPADM

## 2022-06-14 RX ADMIN — HYDROXYZINE HYDROCHLORIDE 25 MG: 25 TABLET, FILM COATED ORAL at 21:15

## 2022-06-14 RX ADMIN — TRAZODONE HYDROCHLORIDE 50 MG: 50 TABLET ORAL at 21:15

## 2022-06-14 RX ADMIN — BUPROPION HYDROCHLORIDE 150 MG: 150 TABLET, EXTENDED RELEASE ORAL at 12:15

## 2022-06-14 ASSESSMENT — SLEEP AND FATIGUE QUESTIONNAIRES
DO YOU USE A SLEEP AID: NO
DO YOU HAVE DIFFICULTY SLEEPING: NO

## 2022-06-14 ASSESSMENT — PATIENT HEALTH QUESTIONNAIRE - PHQ9: SUM OF ALL RESPONSES TO PHQ QUESTIONS 1-9: 14

## 2022-06-14 ASSESSMENT — PAIN - FUNCTIONAL ASSESSMENT: PAIN_FUNCTIONAL_ASSESSMENT: NONE - DENIES PAIN

## 2022-06-14 ASSESSMENT — PAIN SCALES - GENERAL
PAINLEVEL_OUTOF10: 0
PAINLEVEL_OUTOF10: 0

## 2022-06-14 NOTE — PROGRESS NOTES
Behavioral Services  Medicare Certification Upon Admission    I certify that this patient's inpatient psychiatric hospital admission is medically necessary for:    [x] (1) Treatment which could reasonably be expected to improve this patient's condition,       [] (2) Or for diagnostic study;     AND     [x](2) The inpatient psychiatric services are provided while the individual is under the care of a physician and are included in the individualized plan of care.     Estimated length of stay/service 3-5 days  Plan for post-hospital care TBA    Electronically signed by Chaya Sheikh MD on 6/14/2022 at 10:22 AM

## 2022-06-14 NOTE — PLAN OF CARE
Group Therapy Note     Date: 6/14/2022  Start Time: 1100  End Time:  1130  Number of Participants: 11     Type of Group: Psychoeducation     Wellness Binder Information  Module Name:  staying well  Session Number:  1     Patient's Goal:  daily maintenance coping skills     Notes:  pt was verbally prompted to attend group. Pt refused. Information about coping skills was provided. Status After Intervention:       Participation Level:      Participation Quality:         Speech:           Thought Process/Content:         Affective Functioning:         Mood:         Level of consciousness:          Response to Learning:         Endings:      Modes of Intervention:         Discipline Responsible: Psychoeducational Specialist        Signature:  Ester Ortez

## 2022-06-14 NOTE — PROGRESS NOTES
Treatment Team Note:     Therapist met with treatment team to discuss patients treatment and discharge plans.      Progress/Behavior/Group Attendance: TBD     Target Symptoms/Reason for admission: Patient admitted to [facility] due to:. female who presents to the emergency department for mental health evaluation. Adrian Salazar has had multiple stressors recently. Galindo Martinez that she had a baby about 3 months ago.  A little over a month ago she and fiancé and baby all had COVID.  She also developed Bell's palsy during COVID about a month ago. Malcolm Nava has symptoms from this with left-sided facial droop.  Supposed to get  in 2 weeks.  Tonight, got into an argument with ficee and became upset and took a walk.  Took a bottle of ibuprofen with her but did not take any of it. Roverto Gilbert, was contemplating overdose at the time.  Admits that she feels like at times she has auditory hallucinations.  Denies any history of mental health problems before.  Tells me she has not seen anyone about depression.  No HI but is having SI.     Diagnoses: Major depressive disorder, recurrent, severe, without psychotic features, Anxiety unspecified, Insomnia unspecified  UDS: Neg  BAL:  Neg     Aftercare Plan: [address]     Pt lives with: SW will meet with patient to gather information.     Collateral obtained from: SW will meet with patient to gather release of information.   On:     Family Session: TBA     Depression:   Anxiety:     Taking medication:  Rate the quality of sleep:     Misc:

## 2022-06-14 NOTE — PROGRESS NOTES
[facility] Admission Note  Nursing Admission Note        Reason for Admission: Nena Salazar is a 39 y.o. female who presents to the emergency department for mental health evaluation. Patient has had multiple stressors recently. Page Nicolasa that she had a baby about 3 months ago. A little over a month ago she and fiancé and baby all had COVID. She also developed Bell's palsy during COVID about a month ago. Still has symptoms from this with left-sided facial droop. Supposed to get  in 2 weeks. Tonight, got into an argument with joi and became upset and took a walk. Took a bottle of ibuprofen with her but did not take any of it. However, was contemplating overdose at the time. Admits that she feels like at times she has auditory hallucinations. Denies any history of mental health problems before. Tells me she has not seen anyone about depression. No HI but is having SI.     Patient Active Problem List   Diagnosis    Uterine fibroids affecting pregnancy in second trimester    High risk pregnancy, antepartum    History of gestational diabetes    AMA (advanced maternal age) multigravida 35+, second trimester    GBS carrier    Encounter for elective induction of labor     (normal spontaneous vaginal delivery)    Depression with suicidal ideation         Addictive Behavior:   Addictive Behavior  In the Past 3 Months, Have You Felt or Has Someone Told You That You Have a Problem With  : Eating (too much/too little) (stress eating)    Medical Problems:   Past Medical History:   Diagnosis Date    Anemia     Depression with suicidal ideation 2022    GBS carrier     Uterine fibroid        Status EXAM:  Mental Status and Behavioral Exam  Normal: No  Level of Assistance: Independent/Self  Facial Expression: Sad  Affect: Appropriate  Level of Consciousness: Alert  Frequency of Checks: 4 times per hour, close  Mood:Normal: No  Mood: Depressed,Helpless,Sad,Despair,Empty,Worthless, low self-esteem  Motor Activity:Normal: Yes  Eye Contact: Good  Observed Behavior: Cooperative  Sexual Misconduct History: Current - no  Preception: [place] to person,[place] to time,[place] to place,[place] to situation  Attention:Normal: Yes  Thought Processes: Circumstantial  Thought Content:Normal: No  Thought Content: Compulsions  Depression Symptoms: Impaired concentration  Anxiety Symptoms: Panic attack  Meenakshi Symptoms: No problems reported or observed. Hallucinations: Auditory (comment) (hearing voices, occastionaly, cannot tell if they are real or a hallucination)  Delusions: No  Memory:Normal: Yes  Insight and Judgment: No  Insight and Judgment: Poor judgment,Poor insight      Metabolic Screening:    No results found for: LABA1C  No results found for: CHOL  No results found for: TRIG  No results found for: HDL  No components found for: LDLCAL  No results found for: LABVLDL    Body mass index is 25.84 kg/m². BP Readings from Last 2 Encounters:   06/14/22 (!) 148/92   05/09/22 128/77       PATIENT STRENGTHS:       Patient Strengths and Limitations:         Tobacco Screening:  Practical Counseling, on admission, dominique X, if applicable and completed (first 3 are required if patient doesn't refuse):            Recognizing danger situations (included triggers and roadblocks)   N/A              Coping skills (new ways to manage stress, exercise, relaxation techniques, changing routine, distraction  N/A                                                    Basic information about quitting (benefits of quitting, techniques in how to quit, available resources N/A  Referral for counseling faxed to [facility]     N/A                                       Patient refused counseling N/A  Patient has not smoked in the last 30 days X  Patient offered nicotine patch.   Received N/A  Refused N/A  Patient is a non-smoker X         Admission to Unit:    Pt admitted to [facility] under the care of Dr. Luly Brown,  arrived on unit via WC with security and staff from ED    Patient arrived dressed in paper scrubs:  yes. Body assessment and safety check completed by Keri Harding. Jacklyn Nyhan. and  no contraband discovered. Patient belongings and valuables was cataloged and accounted for by Jacklyn Nyhan. Admission completed by Keri Harding. Oriented to unit, unit policy and expectations:  yes    Reviewed and explained all legal documents:  yes    Education for Fall Prevention and Restraints given: yes    Patient signed all legal documents yes   Pt verbalizes understanding:yes     Court  Obtained? yes    Medical Bed:  Does patient require a medical bed? no  If answered yes for medical bed use, does the patient have the following conditions? High risk for falls? NA   Obstructive sleep apnea? NA   Oxygen Use? NA   Use of assistive devices? NA   Other, (explain)? N/A      Identifies stressors.yes   . Protective Factors:  Patient identifies protective factors with nursing staff as follows: Identifies reasons for living: Yes   Supportive Social Network or family: Yes    Belief that suicide is immoral/high spirituality: Yes   Responsibility to family or others/living with family: Yes   Fear of death or dying due to pain and suffering: Yes   Engaged in work or school: Yes     If Patient is unable to identify, reason why? N/A      COVID TEACHING:   Nursing provided education regarding COVID for social distancing, wearing masks, washing hands, and reporting any symptoms: yes  Mask Provided: yes If patient refused, reason: N/A      Admission Note:    PT sitting in dinning area in paper scrubs. PT is cooperative at this encounter. PT presents sad and hopeless, noticeable facial droop on left side from Bell's palsy. PT SI with plan to take pills, ibuprofen, that pt had on her person when she left the house for a walk.  Police noticed pt walking and stopped to talk with pt and that is when pt told officer her situation and officer brought pt to [facility] ED for evaluation. PT went for a walk after her and fiance were into an argument over pt's parenting and fiance accused pt of showing favoritism to son (5 yo) over daughter (2 1/2 mo). PT reports feeling like \"no one needs me\", \"everyone will be ok without me\". PT reports her children can live with their fathers and, \"I would never be missed\".     PT is employed at [employer], [employer]      Electronically signed by Taiwo Clayton RN on 6/14/22 at 3:58 AM CDT

## 2022-06-14 NOTE — PROGRESS NOTES
Group Therapy Note    Start Time: 800  End Time:  565  Number of Participants: 13    Type of Group: Community Meeting       Patient's Goal:  Feeling better      Notes:      Participation Level:  Active Listener       Participation Quality: Appropriate      Thought Process/Content: Logical      Affective Functioning: Congruent      Mood: calm      Level of consciousness:  Alert      Modes of Intervention: Support      Discipline Responsible: Behavioral Health Tech II      Signature:  Gutierrez Leger

## 2022-06-14 NOTE — H&P
Department of Psychiatry  Attending History and Physical        CHIEF COMPLAINT:  \"I feel very depressed. \"    History obtained from: patient, chart    HISTORY OF PRESENT ILLNESS:    70-year-old white female history of depression, admitted after she attempted after she attempted to overdose on Ibuprofen. This is her first psychiatric admission. UDS negative. She recently had COVID and developed Bell's palsy. Patient is observed resting in bed. She appears depressed. She denies suicidal ideation at this time. She has been under a lot of stress. She has a 2 months old baby and a 6 yo autistic son. She developed depression after her daughter was born. She has been arguing with her fiancé lately. They are planning to get  in 2 weeks. Patient continues to work in a [employer] center. Patient reports low mood, anhedonia, poor sleep, energy level and appetite. States her mother and fiancee are helping her with children. She feels burnt out. Anxiety level has been very high. Feels that Zoloft is not helping her and makes her feel very tired. Denies mood swings, racing thoughts, decreased need for sleep. Denies AVH and paranoia. She is open to medication adjustment. PSYCHIATRIC HISTORY:    Diagnoses: Depression  Suicide attempts/gestures: Denies   Prior hospitalizations: Denies   Medication trials: Zoloft   Mental health contact: Lost to follow-up   Head trauma: Denies    SUBSTANCE USE HISTORY:  Denies    Past Medical History:    Past Medical History:   Diagnosis Date    Anemia     Depression with suicidal ideation 6/14/2022    GBS carrier     Uterine fibroid        Past Surgical History:    History reviewed. No pertinent surgical history. Medications Prior to Admission:   Prior to Admission medications    Medication Sig Start Date End Date Taking?  Authorizing Provider   medroxyPROGESTERone (DEPO-PROVERA) 150 MG/ML injection Inject 1 mL into the muscle every 3 months 5/9/22   Monika Pinto, APRN - CNP   sertraline (ZOLOFT) 25 MG tablet Take 1 tablet by mouth daily  Patient not taking: Reported on 6/13/2022 4/11/22   Elmer Gray MD   Prenatal Vit-Fe Fumarate-FA (PRENATAL VITAMIN PO) Take 1 tablet by mouth daily  Patient not taking: Reported on 6/13/2022    Historical Provider, MD   ferrous sulfate (IRON 325) 325 (65 Fe) MG tablet Take 1 tablet by mouth 2 times daily  Patient not taking: Reported on 6/13/2022 9/28/21   EDELMIRA Miller       Allergies:  Codeine and Lactose intolerance (gi)    Social History:  Lives with robin and her 6 yo son and 2 mo old baby. Employed. Family History:   Family History   Problem Relation Age of Onset    Colon Cancer Maternal Aunt     Cancer Paternal Uncle         melanoma     Cancer Maternal Grandmother         uterine/ovarian     No history of psychiatric illness or suicide attempts. REVIEW OF SYSTEMS:  General: No fevers, chills, night sweats, no recent weight loss or gain. Head: No headache, no migraine. Eyes: No recent visual changes. Ears: No recent hearing changes. Nose: No increased congestion or change in sense of smell. Throat: No sore throat, no trouble swallowing. Cardiovascular: No chest pain or palpitations, or dizziness. Respiratory: No cough, wheezes, congestion, or shortness of breath. Gastrointestinal: No abdominal pain, nausea or vomiting, no diarrhea or constipation. Musculo-skeletal: No edema, deformities, or loss of functions. Neurological: No loss of consciousness, abnormal sensations, or weakness. Skin: No rash, abrasions or bruises. PHYSICAL EXAM:  GENERAL APPEARANCE: 39y.o. year-old female in NAD   HEAD: Normocephalic, atraumatic. THROAT: No erythema, exudates, lesions. No tongue laceration. CARDIOVASCULAR: PMI nondisplaced. Regular rhythm and rate. Normal S1 and S2.  PULMONARY: Clear to auscultation bilaterally, no tenderness to palpation. ABDOMEN: Soft, nontender, nondistended.    MUSCULOSKELTAL: No obvious deformities, clubbing, cyanosis or edema, no spinous process or paraspinous tenderness, normal ROM, distal pulses intact symmetric 2+ bilaterally. NEUROLOGICAL: Alert, oriented x 3, CN II-XII grossly intact, motor strength 5/5 all muscle groups, DTR 2+ intact and symmetric, sensation intact to sharp and dull. No abnormal movements or tremors. SKIN: Warm, dry, intact, no rash, abrasions bruises     Vitals:  BP (!) 120/92   Pulse 76   Temp 98.4 °F (36.9 °C) (Oral)   Resp 16   Ht 5' 9\" (1.753 m)   Wt 175 lb (79.4 kg)   SpO2 99%   BMI 25.84 kg/m²     Mental Status Examination:    Appearance: Stated age. Gait not assessed. No abnormal movements or tremor. Behavior: Calm, cooperative  Speech: Normal in tone, volume, and quality. No slurring, dysarthria or pressured speech noted. Mood: \"Depressed \"   Affect: Mood congruent. Thought Process: Appears linear. Thought Content: Denies SI/HI. No overt delusions or paranoia appreciated. Perceptions: Denies auditory or visual hallucinations at present time. Not responding to internal stimuli. Concentration: Intact. Orientation: to person, place, date, and situation. Language: Intact. Fund of information: Intact. Memory: Recent and remote appear intact. Neurovegitative: Poor appetite and sleep. Insight: Impaired. Judgment: Impaired.     DATA:  Lab Results   Component Value Date    WBC 6.6 06/13/2022    HGB 12.4 06/13/2022    HCT 39.6 06/13/2022    MCV 95.4 06/13/2022     06/13/2022     Lab Results   Component Value Date     06/13/2022    K 3.6 06/13/2022     06/13/2022    CO2 21 (L) 06/13/2022    BUN 11 06/13/2022    CREATININE 0.8 06/13/2022    GLUCOSE 109 06/13/2022    CALCIUM 8.9 06/13/2022    PROT 6.7 06/13/2022    LABALBU 3.9 06/13/2022    BILITOT <0.2 06/13/2022    ALKPHOS 46 06/13/2022    AST 13 06/13/2022    ALT 13 06/13/2022    LABGLOM >60 06/13/2022    GFRAA >59 06/13/2022    GLOB 2.8 09/28/2021 ASSESSMENT AND PLAN:  DSM-5 DIAGNOSIS:   Impression:  Major depressive disorder, recurrent, severe, without psychotic features  Anxiety unspecified  Insomnia unspecified  Bell's palsy    Patient is meeting the criteria for inpatient psychiatric treatment. Plan:   -Admit to [facility] and monitor on 15 minute checks. Suicide precautions.  Katya Garcia reviewed. -Gather collateral information from family with release.  -Medical monitoring to be performed by Dr. J Carlos Sherman and associates. Order routine labs. -Acclimate to the unit. Provide supportive psychotherapy.  -Encourage participation in groups and therapeutic activities as appropriate. Work on coping skills. -Medications:    A trial of Wellbutrin to help with depression. Discussed benefits, alternatives, and risks including but not limited to black box warning regarding increase in suicidality, worsening anxiety/depression, hypertension, tachycardia, headache, nausea/GI upset, agitation, dizziness, wt. loss, constipation/diarrhea, insomnia/fatigue, very rare risk of precipitation of delroy in patients with bipolar disorder. Pt. states they have no seizure history, warned of risk of lowered seizure threshold, & thus, increased risk of seizures on medication. Discussed medication may take 6 to 8 weeks for full effect.   Trazodone for sleep and Atarax PRN for anxiety.     -The risks, benefits, side effects, indications, contraindications, and adverse effects of the medications have been discussed.  -The patient has verbalized understanding and has capacity to give informed consent.  -SW help evaluate home environment and provide outpatient resources.  -Discuss with treatment team.

## 2022-06-14 NOTE — PROGRESS NOTES
Treatment Team Note:     Therapist met with treatment team to discuss patients treatment and discharge plans.      Progress/Behavior/Group Attendance: TBD     Target Symptoms/Reason for admission: Patient admitted to [facility] due to:. female who presents to the emergency department for mental health evaluation. Jadyn Muniz has had multiple stressors recently. Mikael Neo that she had a baby about 3 months ago.  A little over a month ago she and fiancé and baby all had COVID.  She also developed Bell's palsy during COVID about a month ago. Arnel Stephens has symptoms from this with left-sided facial droop.  Supposed to get  in 2 weeks.  Tonight, got into an argument with ficee and became upset and took a walk.  Took a bottle of ibuprofen with her but did not take any of it. Kavin Sauceda, was contemplating overdose at the time.  Admits that she feels like at times she has auditory hallucinations.  Denies any history of mental health problems before.  Tells me she has not seen anyone about depression.  No HI but is having SI.     Diagnoses: Major depressive disorder, recurrent, severe, without psychotic features, Anxiety unspecified, Insomnia unspecified  UDS: Neg  BAL:  Neg     Aftercare Plan: [address]     Pt lives with: SW will meet with patient to gather information.     Collateral obtained from: SW will meet with patient to gather release of information.   On:     Family Session: TBA     Depression: \"high\"  Anxiety: \"high\"  SI denies suicidal ideation   HI Negative for homicidal ideation      AVH:Absent     Taking medication:  Rate the quality of sleep:Sleep Quality Fair  Sleep Medications: Yes   PRN Sleep Meds:      Misc:

## 2022-06-14 NOTE — PLAN OF CARE
Problem: Depression  Goal: Will be euthymic at discharge  Outcome: Progressing     Problem: Behavior  Goal: Pt/Family maintain appropriate behavior and adhere to behavioral management agreement, if implemented  Outcome: Progressing     Problem: Anxiety  Goal: Will report anxiety at manageable levels  Outcome: Progressing     Problem: Self Harm/Suicidality  Goal: Will have no self-injury during hospital stay  Outcome: Progressing

## 2022-06-14 NOTE — PROGRESS NOTES
Collateral obtained from: parents [phone](Elvis)    Immediate Stressors & Time Episode Began: patient has been having issues with her boyfriend and she thought that this relationship was going to fix her problems. Patient just had a baby a two months ago. Patient also has a 6years old that is austic and ADHD. Patient has been under a lot of stress due the her boyfriend and her son Bandar Gordon. Patient has been putting off walls and will not talk to anyone. Patients parents are very supportive. Patient is not taking medication or seeing a doctor. Patient is on a birth control shot and also on steroids and has been acting out since. Patient whole family had contacted Covid. Patient had post pardinum depression with her first pregnancy and she ended up in court from almost loosing her son. 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 Hx of attempts): The importance of locking weapons in a secured location was explained and recommended to collateral. Guns are in the home they in in gun case and also there is a crossbow. Support system/Medication Managed by:  The importance of medication management and locking extra medication in a secured location was explained and recommended to collateral.    Who does the pt live with:Patient has the option to stay with her parents when she leaves the hospital.      Additional Info:

## 2022-06-14 NOTE — ED PROVIDER NOTES
Had   [facility]  eMERGENCY dEPARTMENT eNCOUnter      Pt Name: Bear Rosales  MRN: [id number]  [place] 1980  Date of evaluation: 6/13/2022  Provider: Basil Castro MD    CHIEF COMPLAINT       Chief Complaint   Patient presents with    Mental Health Problem         HISTORY OF PRESENT ILLNESS   (Location/Symptom, Timing/Onset,Context/Setting, Quality, Duration, Modifying Factors, Severity)  Note limiting factors. Bear Rosales is a 39 y.o. female who presents to the emergency department for mental health evaluation. Patient has had multiple stressors recently. Brandyn So that she had a baby about 3 months ago. A little over a month ago she and fiancé and baby all had COVID. She also developed Bell's palsy during COVID about a month ago. Still has symptoms from this with left-sided facial droop. Supposed to get  in 2 weeks. Tonight, got into an argument with fiancé and became upset and took a walk. Took a bottle of ibuprofen with her but did not take any of it. However, was contemplating overdose at the time. Admits that she feels like at times she has auditory hallucinations. Denies any history of mental health problems before. Tells me she has not seen anyone about depression. No HI but is having SI.    HPI    NursingNotes were reviewed. REVIEW OF SYSTEMS    (2-9 systems for level 4, 10 or more for level 5)     Review of Systems   Constitutional: Negative for fever. Eyes: Negative for pain. Respiratory: Negative for shortness of breath. Cardiovascular: Negative for chest pain and palpitations. Gastrointestinal: Negative for abdominal pain, diarrhea and vomiting. Genitourinary: Negative for dysuria. Skin: Negative for rash. Neurological: Positive for weakness (left side facial droop x1 month). Negative for headaches. Psychiatric/Behavioral: Positive for hallucinations and suicidal ideas. All other systems reviewed and are negative.       A complete review of systems was performed and is negative except as noted above in the HPI. PAST MEDICAL HISTORY     Past Medical History:   Diagnosis Date    Anemia     Depression with suicidal ideation 6/14/2022    GBS carrier     Uterine fibroid          SURGICAL HISTORY     History reviewed. No pertinent surgical history.       CURRENT MEDICATIONS       Current Discharge Medication List      CONTINUE these medications which have NOT CHANGED    Details   medroxyPROGESTERone (DEPO-PROVERA) 150 MG/ML injection Inject 1 mL into the muscle every 3 months  Qty: 1 mL, Refills: 3      sertraline (ZOLOFT) 25 MG tablet Take 1 tablet by mouth daily  Qty: 30 tablet, Refills: 3    Associated Diagnoses: Postpartum depression      Prenatal Vit-Fe Fumarate-FA (PRENATAL VITAMIN PO) Take 1 tablet by mouth daily      ferrous sulfate (IRON 325) 325 (65 Fe) MG tablet Take 1 tablet by mouth 2 times daily  Qty: 60 tablet, Refills: 5             ALLERGIES     Codeine and Lactose intolerance (gi)    FAMILY HISTORY       Family History   Problem Relation Age of Onset    Colon Cancer Maternal Aunt     Cancer Paternal Uncle         melanoma     Cancer Maternal Grandmother         uterine/ovarian          SOCIAL HISTORY       Social History     Socioeconomic History    Marital status: Single     Spouse name: None    Number of children: None    Years of education: None    Highest education level: None   Occupational History    None   Tobacco Use    Smoking status: Never Smoker    Smokeless tobacco: Never Used   Substance and Sexual Activity    Alcohol use: No    Drug use: Never    Sexual activity: None   Other Topics Concern    None   Social History Narrative    None     Social Determinants of Health     Financial Resource Strain:     Difficulty of Paying Living Expenses: Not on file   Food Insecurity:     Worried About Running Out of Food in the Last Year: Not on file    Shameka of Food in the Last Year: Not on SARA Marrero Needs:     Lack of Transportation (Medical): Not on file    Lack of Transportation (Non-Medical): Not on file   Physical Activity:     Days of Exercise per Week: Not on file    Minutes of Exercise per Session: Not on file   Stress:     Feeling of Stress : Not on file   Social Connections:     Frequency of Communication with Friends and Family: Not on file    Frequency of Social Gatherings with Friends and Family: Not on file    Attends Temple Services: Not on file    Active Member of [address] [place] [address] or Organizations: Not on file    Attends Club or Organization Meetings: Not on file    Marital Status: Not on file   Intimate Partner Violence:     Fear of Current or Ex-Partner: Not on file    Emotionally Abused: Not on file    Physically Abused: Not on file    Sexually Abused: Not on file   Housing Stability:     Unable to Pay for Housing in the Last Year: Not on file    Number of [place] in the Last Year: Not on file    Unstable Housing in the Last Year: Not on file       SCREENINGS    Angelika Coma Scale  Eye Opening: Spontaneous  Best Verbal Response: Oriented  Best Motor Response: Obeys commands  Angelika Coma Scale Score: 15        PHYSICAL EXAM    (up to 7 for level 4, 8 or more for level 5)     ED Triage Vitals [06/13/22 2200]   BP Temp Temp Source Heart Rate Resp SpO2 Height Weight   137/70 98.3 °F (36.8 °C) Oral 78 20 99 % 5' 9\" (1.753 m) 175 lb (79.4 kg)       Physical Exam  Vitals reviewed. Constitutional:       General: She is not in acute distress. Appearance: She is well-developed. HENT:      Head: Normocephalic and atraumatic. Eyes:      General: No scleral icterus. Pupils: Pupils are equal, round, and reactive to light. Neck:      Vascular: No JVD. Cardiovascular:      Rate and Rhythm: Normal rate and regular rhythm. Heart sounds: Normal heart sounds. Pulmonary:      Effort: Pulmonary effort is normal. No respiratory distress.       Breath sounds: Normal breath sounds. Abdominal:      General: There is no distension. Palpations: Abdomen is soft. Tenderness: There is no abdominal tenderness. There is no guarding or rebound. Musculoskeletal:         General: No tenderness. Cervical back: Normal range of motion and neck supple. Skin:     General: Skin is warm and dry. Capillary Refill: Capillary refill takes less than 2 seconds. Neurological:      Mental Status: She is alert and oriented to person, place, and time. Sensory: Sensation is intact. Comments: Left-sided facial droop. This involves forehead. Patient said this has been present for little over a month since her Bell's palsy diagnosis. No other weakness. Psychiatric:         Attention and Perception: Attention normal.         Mood and Affect: Mood is depressed. Speech: Speech normal.         Behavior: Behavior normal.         Thought Content: Thought content is not paranoid or delusional. Thought content includes suicidal ideation. Thought content does not include homicidal ideation. Thought content includes suicidal plan. Thought content does not include homicidal plan.          DIAGNOSTIC RESULTS     EKG: All EKG's are interpreted by the Emergency Department Physician who either signs or Co-signs this chart in the absence of a cardiologist.        RADIOLOGY:   Non-plain film images such as CT, Ultrasound and MRI are read by the radiologist. Cooper Ode images are visualized and preliminarily interpreted by the emergency physician with the below findings:        Interpretation per the Radiologist below, if available at the time of this note:    No orders to display         ED BEDSIDE ULTRASOUND:   Performed by ED Physician - none    LABS:  Labs Reviewed   CBC WITH AUTO DIFFERENTIAL - Abnormal; Notable for the following components:       Result Value    RBC 4.15 (*)     MCHC 31.3 (*)     RDW 15.3 (*)     MPV 9.3 (*)     All other components within normal limits COMPREHENSIVE METABOLIC PANEL - Abnormal; Notable for the following components:    CO2 21 (*)     All other components within normal limits   URINALYSIS WITH REFLEX TO CULTURE - Abnormal; Notable for the following components:    Color, UA DARK YELLOW (*)     Clarity, UA CLOUDY (*)     Ketones, Urine TRACE (*)     Blood, Urine SMALL (*)     Protein, UA 30 (*)     Leukocyte Esterase, Urine TRACE (*)     All other components within normal limits   MICROSCOPIC URINALYSIS - Abnormal; Notable for the following components:    Mucus, UA 2+ (*)     Bacteria, UA 2+ (*)     Crystals, UA 2+ Uric Acid (*)     All other components within normal limits   COVID-19, RAPID   ETHANOL   HCG, SERUM, QUALITATIVE   URINE DRUG SCREEN   ACETAMINOPHEN LEVEL   SALICYLATE LEVEL       All other labs were within normal range or not returned as of this dictation. EMERGENCY DEPARTMENT COURSE and DIFFERENTIALDIAGNOSIS/MDM:   Vitals:    Vitals:    06/13/22 2200 06/14/22 0030 06/14/22 0059   BP: 137/70 126/66 (!) 148/92   Pulse: 78 75 72   Resp: 20 15 16   Temp: 98.3 °F (36.8 °C) 97.9 °F (36.6 °C) (!) 100.8 °F (38.2 °C)   TempSrc: Oral Oral Temporal   SpO2: 99% 97% 98%   Weight: 175 lb (79.4 kg)     Height: 5' 9\" (1.753 m)         MDM  Case discussed with on-call psychiatrist, Dr. Sterling Christensen, who will admit. He will also have nurses upstairs give Lacri-Lube for patient's left eye as well as eye patch for sleeping because of the Bell's palsy. Patient resting comfortably and updated about plan. CONSULTS:  IP CONSULT TO FAMILY MEDICINE    PROCEDURES:  Unless otherwise notedbelow, none     Procedures    FINAL IMPRESSION     1. Bell's palsy    2.  Depression with suicidal ideation          DISPOSITION/PLAN   DISPOSITION Decision To Admit 06/13/2022 11:53:43 PM      PATIENT REFERRED TO:  @FUP@    DISCHARGE MEDICATIONS:  Current Discharge Medication List             (Please note that portions of this note were completed with a voice recognition program.  Efforts were made to edit the dictations butoccasionally words are mis-transcribed.)    Basil Castro MD (electronically signed)  AttendingEmergency Physician         Basil Castro MD  06/14/22 [id number]

## 2022-06-14 NOTE — PLAN OF CARE
Problem: Behavior  Goal: Pt/Family maintain appropriate behavior and adhere to behavioral management agreement, if implemented  Description: INTERVENTIONS:  1. Assess patient/family's coping skills and  non-compliant behavior (including use of illegal substances)  2. Notify security of behavior or suspected illegal substances which indicate the need for search of the patient and/or belongings  3. Encourage verbalization of thoughts and concerns in a socially appropriate manner  4. Utilize positive, consistent limit setting strategies supporting safety of patient, staff and others  5. Encourage participation in the decision making process about the behavioral management agreement  6. Implement a Health Care Agreement if patient meets criteria  7. If a patient's behavior jeopardizes the safety of the patient, staff, or others refer to organization policy. If a visitor's behavior poses a threat to safety call refer to organization policy. 8. Initiate consult with , Psychosocial CNS, Spiritual Care as appropriate  6/14/2022 1201 by Lili Nettles, [id number]   Outcome: Progressing  6/14/2022 0943 by Pete Gamboa RN  Outcome: Progressing     Group Therapy Note     Date: 6/14/2022  Start Time: 1000  End Time:  6512  Number of Participants: 11     Type of Group: Psychotherapy     Patient's Goal: Patient will process emotions and actions and how to relate to other or their with others. Patient discussed open communication and feelings and emotions. Notes:  Patient attended process group as scheduled, patient identified a issue to work on today regarding how they will interact and relate to others. Status After Intervention:  Improved     Participation Level:  Active Listener     Participation Quality: Appropriate, Attentive, and Sharing        Speech:  normal        Thought Process/Content: Logical        Affective Functioning: Congruent        Mood: euthymic        Level of consciousness:  Alert Response to Learning: Able to verbalize current knowledge/experience        Endings: None Reported     Modes of Intervention: Education, Support, and Socialization        Discipline Responsible: /Counselor        Signature:  Erlin Wilson, [facility]

## 2022-06-14 NOTE — PROGRESS NOTES
Admission Note      Reason for admission/Target Symptom: Patient admitted to [facility] due to:. female who presents to the emergency department for mental health evaluation. Patient has had multiple stressors recently. Brandyn So that she had a baby about 3 months ago. A little over a month ago she and fiancé and baby all had COVID. She also developed Bell's palsy during COVID about a month ago. Still has symptoms from this with left-sided facial droop. Supposed to get  in 2 weeks. Tonight, got into an argument with fiancé and became upset and took a walk. Took a bottle of ibuprofen with her but did not take any of it. However, was contemplating overdose at the time. Admits that she feels like at times she has auditory hallucinations. Denies any history of mental health problems before. Tells me she has not seen anyone about depression. No HI but is having SI.      Diagnoses: Depression NOS  UDS: Neg  BAL:  Neg    SW met with treatment team to discuss patient's treatment including care planning, discharge planning, and follow-up needs. Pt has been admitted to [facility]. Treatment team has identified patient's discharge needs as medication management and outpatient therapy/counseling. Pt confirmed  the need for ongoing treatment post inpatient stay. Pt was also provided a handout of contact information for drug and alcohol treatment centers and other community support service such as [facility], AA, and [facility].

## 2022-06-14 NOTE — PROGRESS NOTES
SW met with patient to complete Psychosocial and Lifetime CSSR-S on this date. Patients long and short-term goals discussed. Patient voiced understanding. Treatment plan sheet signed. Patient verbalized understanding of the treatment plan. Patient participated in goals and objectives of the treatment plan. Patient discussed safety plan with . SW explained treatment goals with pt. Decreasing depression and anxiety by improvement of positive coping patterns was discussed. Pt acknowledged understanding of treatment goals and signed treatment plan signature sheet. In the last 6 months has the patient been a danger to self: YES  In the last 6 months has the patient been a danger to others: NO  Legal Guardian/POA: /NO     Provided patient with "[employer]" Online handout entitled \"Quitting Smoking. \"  Reviewed handout with patient: addressing dangers of smoking, developing coping skills, and providing basic information about quitting. Patient received all components practical counseling of tobacco practical counseling during the hospital stay.

## 2022-06-14 NOTE — PROGRESS NOTES
Treatment Team Note:    Therapist met with treatment team to discuss patients treatment and discharge plans. Progress/Behavior/Group Attendance: TBD    Target Symptoms/Reason for admission: Patient admitted to [facility] due to:. female who presents to the emergency department for mental health evaluation. Adrian Salazar has had multiple stressors recently. Galindo Martinez that she had a baby about 3 months ago.  A little over a month ago she and fiancé and baby all had COVID.  She also developed Bell's palsy during COVID about a month ago. Malcolm Nava has symptoms from this with left-sided facial droop.  Supposed to get  in 2 weeks.  Tonight, got into an argument with ficee and became upset and took a walk.  Took a bottle of ibuprofen with her but did not take any of it. Roverto Gilbert, was contemplating overdose at the time.  Admits that she feels like at times she has auditory hallucinations.  Denies any history of mental health problems before.  Tells me she has not seen anyone about depression.  No HI but is having SI.     Diagnoses: Depression NOS  UDS: Neg  BAL:  Neg    Aftercare Plan: [address]    Pt lives with: SW will meet with patient to gather information. Collateral obtained from: SW will meet with patient to gather release of information.   On:    Family Session: TBA    Depression:   Anxiety:    Taking medication:  Rate the quality of sleep:    Misc:

## 2022-06-14 NOTE — ED NOTES
Report to [facility] on [facility]. Pt stable and ready for t'port via wheelchair.      Ayah Murillo RN  06/14/22 7982

## 2022-06-14 NOTE — PLAN OF CARE
Group Therapy Note     Date: 6/14/2022  Start Time: 3297  End Time:  1600  Number of Participants: 9     Type of Group: Recovery     Wellness Binder Information  Module Name:  emotional wellness  Session Number:  1     Patient's Goal:  validation of feelings     Notes:  pt was verbally prompted to attend group. Pt refused. Information about emotional wellness was provided. Status After Intervention:       Participation Level:      Participation Quality:         Speech:           Thought Process/Content:         Affective Functioning:         Mood:         Level of consciousness:          Response to Learning:         Endings:      Modes of Intervention:         Discipline Responsible: Psychoeducational Specialist        Signature:  Myla Hsu

## 2022-06-14 NOTE — PROGRESS NOTES
[facility] Adult Unit Daily Assessment  Nursing Progress Note    Room: [facility]/607-02   Name: Usama Hodge   Age: 39 y.o. Gender: female   Dx: Depression with suicidal ideation  Precautions: suicide risk  Inpatient Status: voluntary       SLEEP:    Sleep Quality Fair  Sleep Medications: Yes   PRN Sleep Meds:       MEDICAL:    Other PRN Meds: No   Med Compliant: Yes  Accu-Chek: No  Oxygen/CPAP/BiPAP: No  CIWA/CINA: No   PAIN Assessment: none  Side Effects from medication: No    COVID Teaching:    Is Patient experiencing any respiratory symptoms (headache, fever, body aches, cough. Joyce Dye ): no  Patient educated by nursing to practice social distancing, wear masks, wash hands frequently: yes    Medical Bed:   Is patient in a medical bed? no   If medical bed is in use, has nursing secured room while patient is awake and out of the room? NA  Has safety checks by nursing been completed on the bed/room this shift? yes    Protective Factors:    Patient identifies protective factors with nursing staff as follows: Identifies reasons for living: Yes   Supportive Social Network or family: Yes    Belief that suicide is immoral/high spirituality: Yes   Responsibility to family or others/living with family: Yes   Fear of death or dying due to pain and suffering: Yes   Engaged in work or school: No     If Patient is unable to identify, reason why? na      PSYCH:    Depression: \"high\"  Anxiety: \"high\"  SI denies suicidal ideation   HI Negative for homicidal ideation      AVH:Absent      GENERAL:    Appetite: good   Percent Meals: 75%   Social: No   Speech: normal   Appearance: appropriately dressed    GROUP:    Group Participation: No  Participation Quality: None    Notes: Patient observed resting in bed. Isolated to room. Coming out to eat meals. Attended an participated in morning group and filled out menu and self inventory but did not attend group before lunch. Withdrawn with decreased energy.  Wearing paper scrubs, with disheveled appearance. Mood is sad and depressed. Affect is congruent. Reports fair appetite with poor sleep. Compliant with medications. Not social but has been pleasant, calm, and cooperative with staff. Alert and oriented x4. Denies suicidal ideation, homicidal ideation, hallucinations, with no evidence of delusions or paranoia.          Electronically signed by Claire Auguste RN on 6/14/22 at 12:40 PM CDT

## 2022-06-15 LAB
CHOLESTEROL, TOTAL: 199 MG/DL (ref 160–199)
HBA1C MFR BLD: 5.9 % (ref 4–6)
HDLC SERPL-MCNC: 35 MG/DL (ref 65–121)
LDL CHOLESTEROL CALCULATED: 144 MG/DL
TRIGL SERPL-MCNC: 100 MG/DL (ref 0–149)
TSH REFLEX FT4: 2.14 UIU/ML (ref 0.35–5.5)
VITAMIN B-12: 312 PG/ML (ref 211–946)
VITAMIN D 25-HYDROXY: 14.2 NG/ML

## 2022-06-15 PROCEDURE — 99232 SBSQ HOSP IP/OBS MODERATE 35: CPT | Performed by: PSYCHIATRY & NEUROLOGY

## 2022-06-15 PROCEDURE — 6370000000 HC RX 637 (ALT 250 FOR IP): Performed by: PSYCHIATRY & NEUROLOGY

## 2022-06-15 PROCEDURE — 82306 VITAMIN D 25 HYDROXY: CPT

## 2022-06-15 PROCEDURE — 84443 ASSAY THYROID STIM HORMONE: CPT

## 2022-06-15 PROCEDURE — 1240000000 HC EMOTIONAL WELLNESS R&B

## 2022-06-15 PROCEDURE — 83036 HEMOGLOBIN GLYCOSYLATED A1C: CPT

## 2022-06-15 PROCEDURE — 80061 LIPID PANEL: CPT

## 2022-06-15 PROCEDURE — 82607 VITAMIN B-12: CPT

## 2022-06-15 PROCEDURE — 6370000000 HC RX 637 (ALT 250 FOR IP): Performed by: FAMILY MEDICINE

## 2022-06-15 PROCEDURE — 36415 COLL VENOUS BLD VENIPUNCTURE: CPT

## 2022-06-15 RX ORDER — ERGOCALCIFEROL 1.25 MG/1
50000 CAPSULE ORAL WEEKLY
Status: DISCONTINUED | OUTPATIENT
Start: 2022-06-15 | End: 2022-06-17 | Stop reason: HOSPADM

## 2022-06-15 RX ORDER — CHOLECALCIFEROL (VITAMIN D3) 125 MCG
500 CAPSULE ORAL DAILY
Status: DISCONTINUED | OUTPATIENT
Start: 2022-06-15 | End: 2022-06-17 | Stop reason: HOSPADM

## 2022-06-15 RX ADMIN — CYANOCOBALAMIN TAB 500 MCG 500 MCG: 500 TAB at 12:55

## 2022-06-15 RX ADMIN — ERGOCALCIFEROL 50000 UNITS: 1.25 CAPSULE ORAL at 12:55

## 2022-06-15 RX ADMIN — BUPROPION HYDROCHLORIDE 150 MG: 150 TABLET, EXTENDED RELEASE ORAL at 08:10

## 2022-06-15 NOTE — H&P
HISTORY and PHYSICAL      CHIEF COMPLAINT:  Depression    Reason for Admission:  Depression    History Obtained From:  Patient, chart    HISTORY OF PRESENT ILLNESS:      The patient is a 39 y.o. female who is admitted to the [place] [address] unit with worsening mood issues. She has no c/o CP or SOA. No abdominal pain or N/V. No dysuria. No new pain issues. No HA or dizziness. No fevers. Past Medical History:        Diagnosis Date    Anemia     Depression with suicidal ideation 6/14/2022    GBS carrier     Uterine fibroid      Past Surgical History:    History reviewed. No pertinent surgical history. Medications Prior to Admission:    Medications Prior to Admission: medroxyPROGESTERone (DEPO-PROVERA) 150 MG/ML injection, Inject 1 mL into the muscle every 3 months  sertraline (ZOLOFT) 25 MG tablet, Take 1 tablet by mouth daily (Patient not taking: Reported on 6/13/2022)  Prenatal Vit-Fe Fumarate-FA (PRENATAL VITAMIN PO), Take 1 tablet by mouth daily (Patient not taking: Reported on 6/13/2022)  ferrous sulfate (IRON 325) 325 (65 Fe) MG tablet, Take 1 tablet by mouth 2 times daily (Patient not taking: Reported on 6/13/2022)    Allergies:  Codeine and Lactose intolerance (gi)    Social History:   TOBACCO:   reports that she has never smoked. She has never used smokeless tobacco.  ETOH:   reports no history of alcohol use. DRUGS:   reports no history of drug use.   MARITAL STATUS:  engaged  OCCUPATION:  She is working  Patient currently lives with robin      Family History:       Problem Relation Age of Onset    Colon Cancer Maternal Aunt     Cancer Paternal Uncle         melanoma     Cancer Maternal Grandmother         uterine/ovarian     REVIEW OF SYSTEMS:  Constitutional: neg  CV: neg  Pulmonary: neg  GI: neg  : neg  Psych: depression  Neuro: neg  Skin: neg  MusculoSkeletal: neg  HEENT: neg  Joints: neg    Vitals:  /72   Pulse 71   Temp 99 °F (37.2 °C) (Temporal)   Resp 16   Ht 5' 9\" (1.753 m)   Wt 175 lb (79.4 kg)   SpO2 100%   BMI 25.84 kg/m²     PHYSICAL EXAM:  Gen: NAD, alert  HEENT: WNL  Lymph: no LAD  Neck: no JVD or masses  Chest: CTA bilat  CV: RRR  Abdomen: NT/ND  Extrem: no C/C/E  Neuro: non focal  Skin: no rashes  Joints: no redness    DATA:  I have reviewed the admission labs and imaging tests.     ASSESSMENT AND PLAN:      Principal Problem:    Depression with suicidal ideation---follow with Tan Sanchez MD  10:50 PM 6/14/2022

## 2022-06-15 NOTE — PLAN OF CARE
Problem: Anxiety  Goal: Will report anxiety at manageable levels  Outcome: Progressing      Group Therapy Note     Date: 6/15/2022  Start Time: 1100  End Time:  8521  Number of Participants: 11     Type of Group: Psychoeducation     Wellness Binder Information  Module Name:  Emotional wellness  Session Number:  5     Patient's Goal:  obstacles to emotional wellness     Notes:  pt acknowledged negative thinking as an obstacle to emotional wellness     Status After Intervention:  Improved     Participation Level: Interactive     Participation Quality: Appropriate, Attentive, and Sharing        Speech:  normal        Thought Process/Content: Logical        Affective Functioning: Congruent        Mood: congruent        Level of consciousness:  Alert, Oriented x4, and Attentive        Response to Learning: Able to verbalize current knowledge/experience        Endings: None Reported     Modes of Intervention: Education        Discipline Responsible: Psychoeducational Specialist        Signature:  Bruno Mccormick

## 2022-06-15 NOTE — PLAN OF CARE
Problem: Behavior  Goal: Pt/Family maintain appropriate behavior and adhere to behavioral management agreement, if implemented  Description: INTERVENTIONS:  1. Assess patient/family's coping skills and  non-compliant behavior (including use of illegal substances)  2. Notify security of behavior or suspected illegal substances which indicate the need for search of the patient and/or belongings  3. Encourage verbalization of thoughts and concerns in a socially appropriate manner  4. Utilize positive, consistent limit setting strategies supporting safety of patient, staff and others  5. Encourage participation in the decision making process about the behavioral management agreement  6. Implement a Health Care Agreement if patient meets criteria  7. If a patient's behavior jeopardizes the safety of the patient, staff, or others refer to organization policy. If a visitor's behavior poses a threat to safety call refer to organization policy. 8. Initiate consult with , Psychosocial CNS, Spiritual Care as appropriate  Outcome: Progressing     Group Therapy Note     Date: 6/15/2022  Start Time: 1000  End Time:  1045  Number of Participants: 10     Type of Group: Psychotherapy     Patient's Goal: Patient will process emotions and actions and how to relate to other or their with others. Patient discussed open communication and feelings and emotions. Notes:  Patient attended process group as scheduled, patient identified a issue to work on today regarding how they will interact and relate to others. Status After Intervention:  Improved     Participation Level:  Active Listener     Participation Quality: Appropriate, Attentive, and Sharing        Speech:  normal        Thought Process/Content: Logical        Affective Functioning: Congruent        Mood: euthymic        Level of consciousness:  Alert        Response to Learning: Able to verbalize current knowledge/experience        Endings: None Reported     Modes of Intervention: Education, Support, and Socialization        Discipline Responsible: /Counselor        Signature:  BALBINA Guerrero [facility]

## 2022-06-15 NOTE — PROGRESS NOTES
[facility] Adult Unit Daily Assessment  Nursing Progress Note    Room: [facility]/607-02   Name: Shawnee Harvey   Age: 39 y.o. Gender: female   Dx: Depression with suicidal ideation  Precautions: close watch and suicide risk  Inpatient Status: voluntary       SLEEP:    Sleep Quality Good  Sleep Medications: Yes   PRN Sleep Meds: No       MEDICAL:    Other PRN Meds: Yes   Med Compliant: Yes  Accu-Chek: No  Oxygen/CPAP/BiPAP: No  CIWA/CINA: No   PAIN Assessment: none  Side Effects from medication: No    COVID Teaching:    Is Patient experiencing any respiratory symptoms (headache, fever, body aches, cough. Ac Gomez ): no  Patient educated by nursing to practice social distancing, wear masks, wash hands frequently: yes    Medical Bed:   Is patient in a medical bed? no  If medical bed is in use, has nursing secured room while patient is awake and out of the room? N/A  Has safety checks by nursing been completed on the bed/room this shift? NA    Protective Factors:    Patient identifies protective factors with nursing staff as follows: Identifies reasons for living: Yes   Supportive Social Network or family: Yes    Belief that suicide is immoral/high spirituality: Yes   Responsibility to family or others/living with family: Yes   Fear of death or dying due to pain and suffering: Yes   Engaged in work or school: Yes     If Patient is unable to identify, reason why? N/A      PSYCH:    Depression: 7   Anxiety: 4   SI denies suicidal ideation   HI Negative for homicidal ideation      AVH:Absent      GENERAL:    Appetite: good   Percent Meals: did not consume a meal this shift   Social: No   Speech: normal   Appearance: appropriately dressed, appropriately groomed and healthy looking    GROUP:    Group Participation: Yes  Participation Quality: Active Listener    Notes: PT in room in bed at this encounter. PT reports starting Wellbutrin and is hoping it helps her depression.  PT has good eye contact and engages with nurse well at this encounter. She reports to nurse she talked to her finace today and he wants her to come home and they can talk calmly this time and work things out. PT is hopeful it goes better this time. PT has good eye contact, calm and cooperative at this encounter.          Electronically signed by Jian Allison RN on 6/15/22 at 3:21 AM CDT

## 2022-06-15 NOTE — PROGRESS NOTES
[facility] Adult Unit Daily Assessment  Nursing Progress Note    Room: 06/607-02   Name: Demetrius Francis   Age: 39 y.o. Gender: female   Dx: Depression with suicidal ideation  Precautions: close watch and suicide risk  Inpatient Status: voluntary       SLEEP:    Sleep Quality Good  Sleep Medications: Yes   PRN Sleep Meds: No       MEDICAL:    Other PRN Meds: No  Med Compliant: Yes  Accu-Chek: No  Oxygen/CPAP/BiPAP: No  CIWA/CINA: No   PAIN Assessment: none  Side Effects from medication: No    COVID Teaching:    Is Patient experiencing any respiratory symptoms (headache, fever, body aches, cough. Shaaron Money ): no  Patient educated by nursing to practice social distancing, wear masks, wash hands frequently: yes    Medical Bed:   Is patient in a medical bed? no  If medical bed is in use, has nursing secured room while patient is awake and out of the room? N/A  Has safety checks by nursing been completed on the bed/room this shift? NA    Protective Factors:    Patient identifies protective factors with nursing staff as follows: Identifies reasons for living: Yes   Supportive Social Network or family: Yes    Belief that suicide is immoral/high spirituality: Yes   Responsibility to family or others/living with family: Yes   Fear of death or dying due to pain and suffering: Yes   Engaged in work or school: Yes     If Patient is unable to identify, reason why? na      PSYCH:    Depression: 3  Anxiety: 2  SI denies suicidal ideation   HI Negative for homicidal ideation      AVH:Absent      GENERAL:    Appetite: good   Percent Meals: 75%  Social: some  Speech: normal   Appearance: appropriately dressed, appropriately groomed and healthy looking    GROUP:    Group Participation: Yes  Participation Quality: Active Listener    Notes:     Patient is alert, oriented, calm and cooperative with staff and peers. Patient has been in the day area and tv area socializing with peers some today. Patient reports that she is feeling better.  Patient states that her fiance has agreed to go to couples therapy to try and work on their issues. patient reports that she slept well, has attended groups and has a good appetite. Patient took a shower today after receiving clean clothes. No obvious s/s of distress voiced or noted. Will continue to monitor.        Electronically signed by Analy Marin RN on 6/15/22 at 3:21 PM CDT

## 2022-06-15 NOTE — PSYCHOTHERAPY
Group Therapy Note    Date: 6/15/2022  Start Time: 1330  End Time:  1400  Number of Participants: 10    Type of Group: SPIRITUALITY    Wellness Binder Information  Module Name:  Mindfulness   Session Number:      Patient's Goal:  To rest the mind on the moment    Notes:      Status After Intervention:  Improved    Participation Level:  Active Listener and Interactive    Participation Quality: Appropriate, Attentive and Sharing      Speech:  normal      Thought Process/Content:       Affective Functioning: Congruent      Mood: calm      Level of consciousness:  Oriented x4      Response to Learning: Able to verbalize current knowledge/experience and Capable of insight      Endings:     Modes of Intervention: Education and Activity      Discipline Responsible:       Signature:  Tammy Pinto  [facility]

## 2022-06-15 NOTE — PLAN OF CARE
Problem: Behavior  Goal: Pt/Family maintain appropriate behavior and adhere to behavioral management agreement, if implemented  6/15/2022 1611 by Junior Murray  Outcome: Progressing   Group Therapy Note     Date: 6/15/2022  Start Time: 5795  End Time:  1600  Number of Participants: 9     Type of Group: Recovery     Wellness Binder Information  Module Name:  relapse prevention  Session Number:  2     Patient's Goal:  identifying early warning signs     Notes:  pt acknowledged negative thinking as an early warning sign to help prevent relapse.      Status After Intervention:  Improved     Participation Level: Interactive     Participation Quality: Appropriate, Attentive, and Sharing        Speech:  normal        Thought Process/Content: Logical        Affective Functioning: Congruent        Mood: congruent        Level of consciousness:  Alert, Oriented x4, and Attentive        Response to Learning: Able to verbalize current knowledge/experience        Endings: None Reported     Modes of Intervention: Education        Discipline Responsible: Psychoeducational Specialist        Signature:  Junior Murray

## 2022-06-15 NOTE — PROGRESS NOTES
Group Therapy Note    Start Time: 800  End Time:  020  Number of Participants: 13    Type of Group: Community Meeting       Patient's Goal:  \"Going to groups\"      Notes:        Participation Level:  Active Listener       Participation Quality: Appropriate      Thought Process/Content: Logical      Affective Functioning: Congruent      Mood: Calm      Level of consciousness:  Alert      Modes of Intervention: Support      Discipline Responsible: Behavioral Health Tech II      Signature:  Mariann Bob

## 2022-06-15 NOTE — PROGRESS NOTES
Treatment Team Note:     Therapist met with treatment team to discuss patients treatment and discharge plans.      Progress/Behavior/Group Attendance: TBD     Target Symptoms/Reason for admission: Patient admitted to [facility] due to:. female who presents to the emergency department for mental health evaluation. Julianna Mccord has had multiple stressors recently. Paula Miguel that she had a baby about 3 months ago.  A little over a month ago she and fiancé and baby all had COVID.  She also developed Bell's palsy during COVID about a month ago. Trupti Lam has symptoms from this with left-sided facial droop.  Supposed to get  in 2 weeks.  Tonight, got into an argument with joi and became upset and took a walk.  Took a bottle of ibuprofen with her but did not take any of it. Connie Caldera, was contemplating overdose at the time.  Admits that she feels like at times she has auditory hallucinations.  Denies any history of mental health problems before.  Tells me she has not seen anyone about depression.  No HI but is having SI.     Diagnoses: Major depressive disorder, recurrent, severe, without psychotic features, Anxiety unspecified, Insomnia unspecified  UDS: Neg  BAL:  Neg     Aftercare Plan: [address]     Pt lives with: boyfriend     Collateral obtained from: parents  On:6/14/22     Family Session: TBA     Depression: \"high\"  Anxiety: \"high\"  SI denies suicidal ideation   HI Negative for homicidal ideation      AVH:Absent     Taking medication:  Rate the quality of sleep:Sleep Quality Fair  Sleep Medications: Yes   PRN Sleep Meds:      Misc:

## 2022-06-15 NOTE — PROGRESS NOTES
Group Note    Number of Participants in Group: 12  Number of Patients on Unit:10      Patient attended group:Yes  Reason for Absence:  Intervention for patient absence:        Type of Group:   Wrap-Up/Relaxation    Patient's Goal: See wrap up group sheet    Participation Level:    Minimal           Patient Response to Learning: Yes    Patient's Behavior: Cooperative    Is Patient Social/Interacting: No    Relaxation:   Television:No   Reading:No   Game/Puzzle:No   Phone: No       Notes/Comments:      Please see patient's wrap up group sheet for patient's comments       Electronically signed by Johanna Perea on 6/15/22 at 2:14 AM CDT

## 2022-06-15 NOTE — PROGRESS NOTES
Department of Psychiatry  Attending Progress Note     Chief complaint: \"I'm ok\"    SUBJECTIVE:   Chart reviewed, discussed with the team. No major issues overnight. Patient is med-compliant. No SEs. Performs ADLs. Social and goes to groups. Patient seen in the TV area. Somewhat sad. Denies SI/HI/AVH. Rates depression 5/10, anxiety 4/10. Slept 6h. Denies physical complaints. States she talked to her fiancee and she is planning to go back home. No concerns. OBJECTIVE    Physical  Wt Readings from Last 3 Encounters:   06/13/22 175 lb (79.4 kg)   05/09/22 174 lb (78.9 kg)   04/11/22 176 lb (79.8 kg)     Temp Readings from Last 3 Encounters:   06/15/22 99.1 °F (37.3 °C) (Temporal)   03/30/22 97.2 °F (36.2 °C) (Temporal)   08/26/21 98.3 °F (36.8 °C) (Temporal)     BP Readings from Last 3 Encounters:   06/15/22 125/74   05/09/22 128/77   03/30/22 127/83     Pulse Readings from Last 3 Encounters:   06/15/22 61   05/09/22 51   03/30/22 62        Review of Systems: 14-point review of systems negative except as described above    Mental Status Examination:   Appearance:  Stated age. Gait stable. No abnormal movements or tremor. Behavior: Calm, cooperative  Speech: Normal in tone, volume, and quality. No slurring, dysarthria or pressured speech noted. Mood: \"OK \"   Affect: Sad   Thought Process: Appears linear. Thought Content:  Denies SI/HI. No overt delusions or paranoia appreciated. Perceptions: Denies auditory or visual hallucinations at present time. Not responding to internal stimuli. Concentration: Intact. Orientation: to person, place, date, and situation. Language: Intact. Fund of information: Intact. Memory: Recent and remote appear intact. Neurovegitative: Improved appetite and sleep. Insight: Improving. Judgment: Improving.     Data  Lab Results   Component Value Date    WBC 6.6 06/13/2022    HGB 12.4 06/13/2022    HCT 39.6 06/13/2022    MCV 95.4 06/13/2022     06/13/2022 Lab Results   Component Value Date     06/13/2022    K 3.6 06/13/2022     06/13/2022    CO2 21 (L) 06/13/2022    BUN 11 06/13/2022    CREATININE 0.8 06/13/2022    GLUCOSE 109 06/13/2022    CALCIUM 8.9 06/13/2022    PROT 6.7 06/13/2022    LABALBU 3.9 06/13/2022    BILITOT <0.2 06/13/2022    ALKPHOS 46 06/13/2022    AST 13 06/13/2022    ALT 13 06/13/2022    LABGLOM >60 06/13/2022    GFRAA >59 06/13/2022    GLOB 2.8 09/28/2021       Medications    Current Facility-Administered Medications:     vitamin D (ERGOCALCIFEROL) capsule 50,000 Units, 50,000 Units, Oral, Weekly, Coni Essex, MD    vitamin B-12 (CYANOCOBALAMIN) tablet 500 mcg, 500 mcg, Oral, Daily, Coni Essex, MD    acetaminophen (TYLENOL) tablet 650 mg, 650 mg, Oral, Q4H PRN, Cameron Kim MD    polyethylene glycol [facility]) packet 17 g, 17 g, Oral, Daily PRN, Cameron Kim MD    buPROPion (WELLBUTRIN XL) extended release tablet 150 mg, 150 mg, Oral, Daily, Casimiro Dominique MD, 150 mg at 06/15/22 0810    hydrOXYzine HCl (ATARAX) tablet 25 mg, 25 mg, Oral, TID PRN, Casimiro Dominique MD, 25 mg at 06/14/22 2115    traZODone (DESYREL) tablet 50 mg, 50 mg, Oral, Nightly, Casimiro Dominique MD, 50 mg at 06/14/22 2115    lubrifresh P.M. (artificial tears) ophthalmic ointment, , Left Eye, Q2H PRN, Cameron Kim MD    ASSESSMENT AND PLAN  DSM 5 DIAGNOSIS  Impression  Major depressive disorder, recurrent, severe, without psychotic features  Anxiety unspecified  Insomnia unspecified  Bell's palsy  Vitamin D deficiency  Vitamin B12 deficiency    Continue to observe. Plan:   1. Psychiatric Medications:   Continue current psychotropic medications as recommended. Monitor for side effects. The risks, benefits, side effects, indications, contraindications, alternatives and adverse effects of the medications have been discussed with patient. 2. Continue to provide supportive psychotherapy.   Encourage socialization and participation in recreational activities. Work on coping skills. 3. Medical Issues:    Continue medical monitoring by Dr. Bronson Busby and associates. 4. Disposition:     to provide outpatient resources and facilitate disposition.      Amount of time spent with patient:      25 minutes with greater than 50 % of the time spent in counseling and collaboration of care

## 2022-06-15 NOTE — PLAN OF CARE
Problem: Depression  Goal: Will be euthymic at discharge  Description: INTERVENTIONS:  1. Administer medication as ordered  2. Provide emotional support via 1:1 interaction with staff  3. Encourage involvement in milieu/groups/activities  4. Monitor for social isolation  Outcome: Progressing     Problem: Behavior  Goal: Pt/Family maintain appropriate behavior and adhere to behavioral management agreement, if implemented  Description: INTERVENTIONS:  1. Assess patient/family's coping skills and  non-compliant behavior (including use of illegal substances)  2. Notify security of behavior or suspected illegal substances which indicate the need for search of the patient and/or belongings  3. Encourage verbalization of thoughts and concerns in a socially appropriate manner  4. Utilize positive, consistent limit setting strategies supporting safety of patient, staff and others  5. Encourage participation in the decision making process about the behavioral management agreement  6. Implement a Health Care Agreement if patient meets criteria  7. If a patient's behavior jeopardizes the safety of the patient, staff, or others refer to organization policy. If a visitor's behavior poses a threat to safety call refer to organization policy. 8. Initiate consult with , Psychosocial CNS, Spiritual Care as appropriate  6/15/2022 1148 by Arnel Sewell RN  Outcome: Progressing  6/15/2022 1135 by Aura Luna LPC  Outcome: Progressing     Problem: Anxiety  Goal: Will report anxiety at manageable levels  Description: INTERVENTIONS:  1. Administer medication as ordered  2. Teach and rehearse alternative coping skills  3.  Provide emotional support with 1:1 interaction with staff  6/15/2022 1148 by Arnel Sewell RN  Outcome: Progressing  6/15/2022 1135 by Alin De La Cruz  Outcome: Progressing     Problem: Self Harm/Suicidality  Goal: Will have no self-injury during hospital stay  Description: INTERVENTIONS:  1. Q 30 MINUTES: Routine safety checks  2. Q SHIFT & PRN: Assess risk to determine if routine checks are adequate to maintain patient safety  Outcome: Progressing

## 2022-06-16 PROCEDURE — 6370000000 HC RX 637 (ALT 250 FOR IP): Performed by: PSYCHIATRY & NEUROLOGY

## 2022-06-16 PROCEDURE — 1240000000 HC EMOTIONAL WELLNESS R&B

## 2022-06-16 PROCEDURE — 6370000000 HC RX 637 (ALT 250 FOR IP): Performed by: FAMILY MEDICINE

## 2022-06-16 PROCEDURE — 99233 SBSQ HOSP IP/OBS HIGH 50: CPT | Performed by: PSYCHIATRY & NEUROLOGY

## 2022-06-16 RX ADMIN — CYANOCOBALAMIN TAB 500 MCG 500 MCG: 500 TAB at 07:49

## 2022-06-16 RX ADMIN — BUPROPION HYDROCHLORIDE 150 MG: 150 TABLET, EXTENDED RELEASE ORAL at 07:49

## 2022-06-16 NOTE — PROGRESS NOTES
[facility] Adult Unit Daily Assessment  Nursing Progress Note    Room: [facility]/607-02   Name: Leslie Aquino   Age: 39 y.o. Gender: female   Dx: Depression with suicidal ideation  Precautions: suicide risk  Inpatient Status: voluntary       SLEEP:    Sleep Quality Good  Sleep Medications: No   PRN Sleep Meds: No       MEDICAL:    Other PRN Meds: No   Med Compliant: No  Accu-Chek: No  Oxygen/CPAP/BiPAP: No  CIWA/CINA: No   PAIN Assessment: none  Side Effects from medication: No    COVID Teaching:    Is Patient experiencing any respiratory symptoms (headache, fever, body aches, cough. [place] ): no  Patient educated by nursing to practice social distancing, wear masks, wash hands frequently: yes    Medical Bed:   Is patient in a medical bed? no   If medical bed is in use, has nursing secured room while patient is awake and out of the room? NA  Has safety checks by nursing been completed on the bed/room this shift? NA    Protective Factors:    Patient identifies protective factors with nursing staff as follows: Identifies reasons for living: Yes   Supportive Social Network or family: Yes    Belief that suicide is immoral/high spirituality: Yes   Responsibility to family or others/living with family: Yes   Fear of death or dying due to pain and suffering: Yes   Engaged in work or school: Yes     If Patient is unable to identify, reason why? n/a      PSYCH:    Depression: 3   Anxiety: 3   SI denies suicidal ideation   HI Negative for homicidal ideation      AVH:Absent      GENERAL:    Appetite: good   Percent Meals: 100%   Social: Yes   Speech: normal   Appearance: appropriately dressed and healthy looking    GROUP:    Group Participation: Yes  Participation Quality: Minimal    Notes:     Patient is cooperative, Alert and Oriented x4, appears Pleasant. Patient Rates Depression a 3 and Anxiety a 3 on a 0-10 scale, with 10 being the worst. Patient affect is Congruent.  Exhibited a brightened facial expression; maintained good  eye contact throughout interview. Patient denies having current [address]. Patient is compliant with medications and group. No signs of distress noted; Patient observed socializing with peers after interview.        Electronically signed by Rodolfo Lagos RN on 6/16/22 at 12:16 AM CDT

## 2022-06-16 NOTE — PLAN OF CARE
Problem: Depression  Goal: Will be euthymic at discharge  Description: INTERVENTIONS:  1. Administer medication as ordered  2. Provide emotional support via 1:1 interaction with staff  3. Encourage involvement in milieu/groups/activities  4.  Monitor for social isolation  6/16/2022 1843 by Albaro Mittal RN  Outcome: Progressing  6/16/2022 1210 by Syed Khan, [address]   Outcome: Progressing  6/16/2022 1114 by Syed Khan LPC  Outcome: Progressing

## 2022-06-16 NOTE — PROGRESS NOTES
Progress Note  Eunice Melgaron  6/16/2022 7:25 AM  Subjective:   Admit Date:   6/13/2022      CC/ADMIT DX:       Interval History:   Reviewed overnight events and nursing notes. No new physical complaints. I have reviewed all labs/diagnostics from the last 24hrs. ROS:   I have done a 10 point ROS and all are negative, except what is mentioned in the HPI. ADULT DIET; Regular    Medications:      vitamin D  50,000 Units Oral Weekly    vitamin B-12  500 mcg Oral Daily    buPROPion  150 mg Oral Daily    traZODone  50 mg Oral Nightly           Objective:   Vitals: BP (!) 128/90   Pulse 66   Temp 99.1 °F (37.3 °C) (Temporal)   Resp 16   Ht 5' 9\" (1.753 m)   Wt 175 lb (79.4 kg)   SpO2 99%   BMI 25.84 kg/m²  No intake or output data in the 24 hours ending 06/16/22 0725  General appearance: alert and cooperative with exam  Extremities: extremities normal, atraumatic, no cyanosis or edema  Neurologic:  No obvious focal neurologic deficits. Skin: no rashes    Assessment and Plan:   Principal Problem:    Depression with suicidal ideation  Active Problems:    Major depressive disorder, recurrent, severe without psychotic features ([address]  Resolved Problems:    * No resolved hospital problems. *    Vit D Def    Plan:  1. Continue present medication(s)   2. Replace Vit D  3. Follow with Psych      Discharge planning:   her home     Reviewed treatment plans with the patient and/or family.              Electronically signed by Coni Essex, MD on 6/16/2022 at 7:25 AM

## 2022-06-16 NOTE — PROGRESS NOTES
Department of Psychiatry  Attending Progress Note      SUBJECTIVE:    39years old female with previous psychiatric history of depression, who has been admitted to our psychiatric unit secondary to attempted overdose on ibuprofen. Patient has been seen in treatment team room. She reported that her condition significantly improved during this hospital stay and her mood is \"much better\" today. She endorses good appetite and good quality of sleep during the last night. Patient is compliant with his currently prescribed medications and denies any side effects. She endorses beneficial effect of prescribed medications for her mood and sleep. Patient denies any affective symptomatology today, denies any depression, anxiety or psychotic symptoms. She attends group activities in the unit and participates in those activities. Patient is social with medical staff and other patients in the unit. She performs her ADLs daily. Patient denies current active suicidal or homicidal ideations, denies any plans. Also, she denies any auditory and visual hallucinations. OBJECTIVE    Physical  VITALS:  /79   Pulse 70   Temp 99.1 °F (37.3 °C) (Temporal)   Resp 16   Ht 5' 9\" (1.753 m)   Wt 175 lb (79.4 kg)   SpO2 100%   BMI 25.84 kg/m²   TEMPERATURE:  Current - Temp: 99.1 °F (37.3 °C); Max - Temp  Av.1 °F (37.3 °C)  Min: 99.1 °F (37.3 °C)  Max: 99.1 °F (37.3 °C)  RESPIRATIONS RANGE: Resp  Av  Min: 16  Max: 16  PULSE RANGE: Pulse  Av  Min: 66  Max: 70  BLOOD PRESSURE RANGE:  Systolic (48CXQ), XDA:593 , Min:128 , YNZ:137   ; Diastolic (48QWG), PZM:22, Min:79, Max:90    PULSE OXIMETRY RANGE: SpO2  Av.5 %  Min: 99 %  Max: 100 %    Review of Systems: 14 point review of systems is negative    Psychological ROS: Negative    Mental Status Examination:    Appearance: Appropriately groomed, wearing casual civilian clothes. Made good eye contact.    Behavior: Calm, cooperative, friendly, and socially appropriate. No psychomotor retardation/agitation appreciated. Gait unremarkable. Speech: Normal in tone, volume, and quality. Mood: \"much better\"   Affect: Mood congruent. Range is full. Thought Process: Appears linear and goal oriented. Thought Content: Patient does not have any current active suicidal and homicidal ideations. No overt delusions or paranoia appreciated. Perceptions: Seems patient does not have any auditory or visual hallucinations at present time. Patient did not appear to be responding to internal stimuli. No overt psychosis. Orientation: to person, place, date, and situation. Alert. Impulsivity: Improved  Neurovegitative: Good appetite, good sleep  Insight: Improved  Judgment: Limited    Data  No new lab tests results to review    Medications  Current Facility-Administered Medications: vitamin D (ERGOCALCIFEROL) capsule 50,000 Units, 50,000 Units, Oral, Weekly  vitamin B-12 (CYANOCOBALAMIN) tablet 500 mcg, 500 mcg, Oral, Daily  acetaminophen (TYLENOL) tablet 650 mg, 650 mg, Oral, Q4H PRN  polyethylene glycol (GLYCOLAX) packet 17 g, 17 g, Oral, Daily PRN  buPROPion (WELLBUTRIN XL) extended release tablet 150 mg, 150 mg, Oral, Daily  hydrOXYzine HCl (ATARAX) tablet 25 mg, 25 mg, Oral, TID PRN  traZODone (DESYREL) tablet 50 mg, 50 mg, Oral, Nightly  lubrifresh P.M. (artificial tears) ophthalmic ointment, , Left Eye, Q2H PRN    ASSESSMENT AND PLAN    DSM-5 DIAGNOSIS:   Major depressive disorder, recurrent, severe, without psychotic features  Anxiety unspecified  Insomnia unspecified  Bell's palsy  Vitamin B12 deficiency  Vitamin D deficiency      Plan:   1. Psychiatric Medications:   Continue current psychotropic medications as recommended. Patient denies side effect of currently prescribed medications. No changes to the dose of prescribed psychotropic medications today    2. Medical Issues:    Continue medical monitoring by Dr. Brandi Terry and associates.       3. Disposition: Discharge patient home when she will be in stable mental condition and adjustment psychotropic medications will be done.      Amount of time spent with patient:    35 minutes with greater than 50% of the time spent in counseling and collaboration of care

## 2022-06-16 NOTE — PLAN OF CARE
Problem: Depression  Goal: Will be euthymic at discharge  Description: INTERVENTIONS:  1. Administer medication as ordered  2. Provide emotional support via 1:1 interaction with staff  3. Encourage involvement in milieu/groups/activities  4. Monitor for social isolation  6/16/2022 1210 by Merly Varghese LPC  Outcome: Progressing     Group Therapy Note     Date: 6/16/2022  Start Time: 1000  End Time:  1274  Number of Participants: 9     Type of Group: Psychotherapy     Patient's Goal: Patient will process emotions and actions and how to relate to other or their with others. Patient discussed open communication and feelings and emotions. Notes:  Patient attended process group as scheduled, patient identified a issue to work on today regarding how they will interact and relate to others. Status After Intervention:  Improved     Participation Level:  Active Listener     Participation Quality: Appropriate, Attentive, and Sharing        Speech:  normal        Thought Process/Content: Logical        Affective Functioning: Congruent        Mood: euthymic        Level of consciousness:  Alert        Response to Learning: Able to verbalize current knowledge/experience        Endings: None Reported     Modes of Intervention: Education, Support, and Socialization        Discipline Responsible: /Counselor        Signature:  Merly Varghese [facility]

## 2022-06-16 NOTE — PROGRESS NOTES
Group Note    Number of Participants in Group: 11  Number of Patients on Unit:12      Patient attended group:Yes  Reason for Absence:  Intervention for patient absence:        Type of Group:   Wrap-Up/Relaxation    Patient's Goal: See wrap up group sheet    Participation Level:     Active Listener           Patient Response to Learning: Yes    Patient's Behavior: Cooperative    Is Patient Social/Interacting: Yes    Relaxation:   Television:Yes   Reading:No   Game/Puzzle:No   Phone: No       Notes/Comments:      Please see patient's wrap up group sheet for patient's comments       Electronically signed by Shay Dan on 6/16/22 at 3:32 AM CDT

## 2022-06-16 NOTE — PLAN OF CARE
Problem: Depression  Goal: Will be euthymic at discharge  Description: INTERVENTIONS:  1. Administer medication as ordered  2. Provide emotional support via 1:1 interaction with staff  3. Encourage involvement in milieu/groups/activities  4. Monitor for social isolation  Outcome: Progressing     Group Therapy Note     Date: 6/16/2022  Start Time: 1000  End Time:  1885  Number of Participants: 9     Type of Group: Psychotherapy     Patient's Goal: Patient will process emotions and actions and how to relate to other or their with others. Patient discussed open communication and feelings and emotions. Notes:  Patient attended process group as scheduled, patient identified a issue to work on today regarding how they will interact and relate to others. Status After Intervention:  Improved     Participation Level:  Active Listener     Participation Quality: Appropriate, Attentive, and Sharing        Speech:  normal        Thought Process/Content: Logical        Affective Functioning: Congruent        Mood: euthymic        Level of consciousness:  Alert        Response to Learning: Able to verbalize current knowledge/experience        Endings: None Reported     Modes of Intervention: Education, Support, and Socialization        Discipline Responsible: /Counselor        Signature:  Wendy Hancock [facility] [place]

## 2022-06-16 NOTE — PROGRESS NOTES
[facility] Adult Unit Daily Assessment  Nursing Progress Note    Room: [facility]/607-02   Name: Shanda Jarquin   Age: 39 y.o. Gender: female   Dx: Depression with suicidal ideation  Precautions: suicide risk  Inpatient Status: voluntary       SLEEP:    Sleep Quality Good  Sleep Medications:    PRN Sleep Meds: Yes       MEDICAL:    Other PRN Meds:   Med Compliant: Yes  Accu-Chek: No  Oxygen/CPAP/BiPAP: Yes  CIWA/CINA: No   PAIN Assessment: none  Side Effects from medication: No    COVID Teaching:    Is Patient experiencing any respiratory symptoms (headache, fever, body aches, cough. [place] ): no  Patient educated by nursing to practice social distancing, wear masks, wash hands frequently: yes    Medical Bed:   Is patient in a medical bed? no   If medical bed is in use, has nursing secured room while patient is awake and out of the room? yes  Has safety checks by nursing been completed on the bed/room this shift? no    Protective Factors:    Patient identifies protective factors with nursing staff as follows: Identifies reasons for living: Yes   Supportive Social Network or family: Yes    Belief that suicide is immoral/high spirituality: Yes   Responsibility to family or others/living with family: Yes   Fear of death or dying due to pain and suffering: Yes   Engaged in work or school: Yes     If Patient is unable to identify, reason why?        PSYCH:    Depression: 1   Anxiety: 2   SI denies suicidal ideation   HI Negative for homicidal ideation      AVH:Absent      GENERAL:    Appetite: good   Percent Meals: 75%   Social: Yes   Speech: normal   Appearance: appropriately dressed and healthy looking    GROUP:    Group Participation: Yes  Participation Quality: Active Listener    Notes:         Electronically signed by Kenan Viera RN on 6/16/22 at 6:42 PM CDT

## 2022-06-16 NOTE — PROGRESS NOTES
Group Therapy Note    Start Time: 800  End Time:  302  Number of Participants: 13    Type of Group: Community Meeting       Patient's Goal:  \"Getting to go home\"      Notes:        Participation Level:  Active Listener       Participation Quality: Appropriate      Thought Process/Content: Logical      Affective Functioning: Congruent      Mood: Calm      Level of consciousness:  Alert      Modes of Intervention: Support      Discipline Responsible: Behavioral Health Tech II      Signature:  Avis Fuentes [place] eMERGENCY dEPARTMENT encounter:    [facility] EMERGENCY DEPARTMENT  [address] [facility] [place] [address]  [phone]    CHIEF COMPLAINT:    Chief Complaint   Patient presents with   • Dental Pain       HPI:    This is a 30 year old male who presented to the ED with the history/complaint of dental pain. Patient reports he cracked his right lower tooth a month ago. Yesterday he woke up with severe pain in his tooth Daul achy in nature worse with heat or cold. Patient using Tylenol and ibuprofen alternating every 4-6 hours with minimal relief. He is not contacted a dentist yet. He hasn't plans to call [facility] tomorrow morning. He is requesting relief from now. Denies any fever, chills, neck pain, problem swallowing.      ALLERGIES:    ALLERGIES:   Allergen Reactions   • Monistat RASH     Penile Rash       CURRENT MEDICATIONS:    No current facility-administered medications for this encounter.      Current Outpatient Medications   Medication Sig Dispense Refill   • amoxicillin-clavulanate (AUGMENTIN) 875-125 MG per tablet Take 1 tablet by mouth every 12 hours for 7 days. 20 tablet 0   • albuterol 108 (90 Base) MCG/ACT inhaler Inhale 2 puffs into the lungs every 4 hours as needed for Shortness of Breath or Wheezing. 8.5 g 0   • fluticasone (FLONASE) 50 MCG/ACT nasal spray Spray 1 spray in each nostril daily. 16 g 0   • clonazePAM (KLONOPIN) 1 MG tablet 1 po once 15 - 20 minutes prior to sleep study 1 tablet 0   • amLODIPine (NORVASC) 10 MG tablet Take 1 tablet by mouth daily. 30 tablet 1   • diclofenac (VOLTAREN) 75 MG EC tablet Take 1 tablet by mouth 2 times daily. 60 tablet 0   • meloxicam (MOBIC) 15 MG tablet Take 1 tablet by mouth daily. 30 tablet 0   • acetaminophen (TYLENOL) 325 MG tablet Take 650 mg by mouth every 4 hours as needed for Pain.     • ibuprofen (MOTRIN) 200 MG tablet Take 200 mg by mouth every 6 hours as needed for Pain.         PROBLEM LIST: No  Patient  Active Problem List   Diagnosis   • Pain, dental   • Benign essential HTN   • Bunion of great toe of left foot   • Severe obstructive sleep apnea        PAST MEDICAL HISTORY:    Past Medical History:   Diagnosis Date   • Essential (primary) hypertension    • Head ache        SURGICAL HISTORY:    History reviewed. No pertinent surgical history.    SOCIAL HISTORY:    Social History     Socioeconomic History   • Marital status: Single     Spouse name: None   • Number of children: None   • Years of education: None   • Highest education level: None   Social Needs   • Financial resource strain: None   • Food insecurity - worry: None   • Food insecurity - inability: None   • Transportation needs - medical: None   • Transportation needs - non-medical: None   Occupational History   • None   Tobacco Use   • Smoking status: Current Every Day Smoker     Packs/day: 0.50     Types: Cigarettes     Last attempt to quit: 2015     Years since quittin.0   • Smokeless tobacco: Never Used   • Tobacco comment: has cut back a lot   Substance and Sexual Activity   • Alcohol use: Yes     Alcohol/week: 1.2 oz     Types: 2 Standard drinks or equivalent per week     Comment: 2 mixed drinks a week   • Drug use: No   • Sexual activity: Yes     Birth control/protection: None, Condom     Comment: sometimes unprotected   Other Topics Concern   • None   Social History Narrative   • None       FAMILY HISTORY:    Family History   Problem Relation Age of Onset   • Depression Sister    • Depression Brother    • Depression Sister    • Psychiatric Sister         Bipolar   • Depression Sister    • Depression Brother    • Depression Brother    • Cancer Mother         Breast   • Depression Mother    • Arthritis Father    • Depression Father    • Diabetes Father    • Heart disease Father    • High blood pressure Father    • Cancer Maternal Grandmother         Breast       REVIEW OF SYSTEMS    Constitutional:  Denies fever or chills.   Eyes:  Denies  change in visual acuity.   HENT:  Denies nasal congestion or sore throat. Dental pain  Respiratory:  Denies cough or shortness of breath.   Cardiovascular:  Denies chest pain or edema.   GI:  Denies abdominal pain, nausea, vomiting, bloody stools or diarrhea.    Musculoskeletal: Denies trauma, falls   Integument:  Denies rash or lession.   Neurologic:  Denies weakness, numbness    PHYSICAL EXAM    ED Triage Vitals [19]   /80   Pulse 76   Resp 16   Temp 97.4 °F (36.3 °C)   SpO2 99 %     Constitutional:   Alert, cooperative, conversive in no acute distress.   Integument:  No rash or lesion.   HEENT:  Sclera and conjunctiva are normal bilaterally. Cracked tooth #30. No submandibular lymph nodes, no neck mass. No erythema or do not appreciate dental abscess  Neck: Trachea is midline.  C-Spine: FROM.  No posterior midline tenderness, paraspinal tenderness or spasm.   Respiratory:  CTA.  No rales, rhonchi or wheezes.  Cardiovascular:  RRR without murmur.  No edema.    Abdomen:  Non distended, nontender, no hepatosplenomegaly   Musculoskeletal: Upper and lower ext nontender bilaterally, normal ROM, no bony step abnormalities  Back:  Normal alignment. No CVA or midline bony tenderness.    Neurologic:  Cranial nerve II-XII grossly intact, no focal weakness or numbness    RADIOLOGY AND LAB RESULTS:  No results found for this visit on 19.  No orders to display       Vitals:    19   BP: 166/80   Pulse: 76   Resp: 16   Temp: 97.4 °F (36.3 °C)   TempSrc: Temporal   SpO2: 99%   Weight: 115.1 kg   Height: 6' (1.829 m)      Medications   LIDOCAINE HCL 1 % IJ SOLN Pyxis Override (10 mg Dental Given by Other 19)   bupivacaine PF (SENSORCAINE-MPF) 0.5 % (PF) injection 25 mg (25 mg Infiltration Given by Other 19)             ED COURSE & MEDICAL DECISION MAKIN-year-old  male with no past medical history present valuation dental pain. Patient will be started on  antibiotics for dental infection. Dental block was performed. He was consented for this. He'll follow-up with dentistry within 1 day.    Dental Block  Date/Time: 2/17/2019 7:50 PM  Performed by: Erickson Meier DO  Authorized by: Erickson Meier DO     Consent:     Consent obtained:  Written    Consent given by:  Patient    Risks discussed:  Allergic reaction, hematoma, infection, nerve damage, pain, swelling and unsuccessful block    Alternatives discussed:  No treatment  Universal protocol:     Procedure explained and questions answered to patient or proxy's satisfaction: yes      Relevant documents present and verified: yes      Test results available and properly labeled: yes      Imaging studies available: yes      Required blood products, implants, devices, and special equipment available: yes      Site/side marked: no      Immediately prior to procedure, a time out was called: no      Patient identity confirmed:  Verbally with patient  Indications:     Indications: dental pain    Location:     Block type:  Supraperiosteal    Supraperiosteal location:  Lower teeth    Lower teeth location:  30/RL 1st molar  Procedure details (see MAR for exact dosages):     Syringe type:  Controlled syringe    Needle gauge:  27 G    Anesthetic injected:  Bupivacaine 0.5% w/o epi and lidocaine 1% w/o epi  Post-procedure details:     Outcome:  Anesthesia achieved    Patient tolerance of procedure:  Tolerated well, no immediate complications                DIAGNOSIS:  1. Dentalgia    2. Dental infection              The patient understands that if sx do not improve, worsen, or there are any other concerns they should immediately return to the ER or call 911/ambulance.       Erickson Meier DO  02/17/19 1951

## 2022-06-16 NOTE — PROGRESS NOTES
Treatment Team Note:     Therapist met with treatment team to discuss patients treatment and discharge plans.      Progress/Behavior/Group Attendance: TBD     Target Symptoms/Reason for admission: Patient admitted to [facility] due to:. female who presents to the emergency department for mental health evaluation. Guevara Antonio has had multiple stressors recently. Izzy Baker that she had a baby about 3 months ago.  A little over a month ago she and fiancé and baby all had COVID.  She also developed Bell's palsy during COVID about a month ago. August Lab has symptoms from this with left-sided facial droop.  Supposed to get  in 2 weeks.  Tonight, got into an argument with joi and became upset and took a walk.  Took a bottle of ibuprofen with her but did not take any of it. Janak Marquez, was contemplating overdose at the time.  Admits that she feels like at times she has auditory hallucinations.  Denies any history of mental health problems before.  Tells me she has not seen anyone about depression.  No HI but is having SI.     Diagnoses: Major depressive disorder, recurrent, severe, without psychotic features, Anxiety unspecified, Insomnia unspecified  UDS: Neg  BAL:  Neg     Aftercare Plan: [address]     Pt lives with: boyfriend     Collateral obtained from: parents  On:6/14/22     Family Session: TBA     Depression: \"high\"  Anxiety: \"high\"  SI denies suicidal ideation   HI Negative for homicidal ideation      AVH:Absent     Taking medication:  Rate the quality of sleep:Sleep Quality Fair  Sleep Medications: Yes   PRN Sleep Meds:      Misc:

## 2022-06-17 VITALS
TEMPERATURE: 99.5 F | OXYGEN SATURATION: 98 % | BODY MASS INDEX: 25.92 KG/M2 | DIASTOLIC BLOOD PRESSURE: 85 MMHG | WEIGHT: 175 LBS | HEIGHT: 69 IN | RESPIRATION RATE: 20 BRPM | HEART RATE: 84 BPM | SYSTOLIC BLOOD PRESSURE: 135 MMHG

## 2022-06-17 PROCEDURE — 6370000000 HC RX 637 (ALT 250 FOR IP): Performed by: FAMILY MEDICINE

## 2022-06-17 PROCEDURE — 5130000000 HC BRIDGE APPOINTMENT

## 2022-06-17 PROCEDURE — 6370000000 HC RX 637 (ALT 250 FOR IP): Performed by: PSYCHIATRY & NEUROLOGY

## 2022-06-17 PROCEDURE — 99239 HOSP IP/OBS DSCHRG MGMT >30: CPT | Performed by: PSYCHIATRY & NEUROLOGY

## 2022-06-17 RX ORDER — ERGOCALCIFEROL 1.25 MG/1
50000 CAPSULE ORAL WEEKLY
Qty: 11 CAPSULE | Refills: 0 | Status: SHIPPED | OUTPATIENT
Start: 2022-06-22 | End: 2022-10-27

## 2022-06-17 RX ORDER — BUPROPION HYDROCHLORIDE 150 MG/1
150 TABLET ORAL DAILY
Qty: 30 TABLET | Refills: 1 | Status: SHIPPED | OUTPATIENT
Start: 2022-06-18 | End: 2022-10-27

## 2022-06-17 RX ORDER — TRAZODONE HYDROCHLORIDE 50 MG/1
50 TABLET ORAL NIGHTLY
Qty: 30 TABLET | Refills: 1 | Status: SHIPPED | OUTPATIENT
Start: 2022-06-17

## 2022-06-17 RX ADMIN — BUPROPION HYDROCHLORIDE 150 MG: 150 TABLET, EXTENDED RELEASE ORAL at 08:09

## 2022-06-17 RX ADMIN — CYANOCOBALAMIN TAB 500 MCG 500 MCG: 500 TAB at 08:08

## 2022-06-17 NOTE — PROGRESS NOTES
Progress Note  Eunice Villegas  6/17/2022 12:11 AM  Subjective:   Admit Date:   6/13/2022      CC/ADMIT DX:       Interval History:   Reviewed overnight events and nursing notes. She has no medical complaints. I have reviewed all labs/diagnostics from the last 24hrs. ROS:   I have done a 10 point ROS and all are negative, except what is mentioned in the HPI. ADULT DIET; Regular    Medications:      vitamin D  50,000 Units Oral Weekly    vitamin B-12  500 mcg Oral Daily    buPROPion  150 mg Oral Daily    traZODone  50 mg Oral Nightly           Objective:   Vitals: BP (!) 142/83   Pulse 68   Temp 99.1 °F (37.3 °C) (Temporal)   Resp 16   Ht 5' 9\" (1.753 m)   Wt 175 lb (79.4 kg)   SpO2 100%   BMI 25.84 kg/m²  No intake or output data in the 24 hours ending 06/17/22 0011  General appearance: alert and cooperative with exam  Extremities: extremities normal, atraumatic, no cyanosis or edema  Neurologic:  No obvious focal neurologic deficits. Skin: no rashes    Assessment and Plan:   Principal Problem:    Depression with suicidal ideation  Active Problems:    Major depressive disorder, recurrent, severe without psychotic features ([address]  Resolved Problems:    * No resolved hospital problems. *    Vit D Def    Plan:  1. Continue present medication(s)   2. She is medically stable. I will monitor for any changes or concerns. 3.  Follow with Psych      Discharge planning:   her home     Reviewed treatment plans with the patient and/or family.              Electronically signed by Barrie Carabllo MD on 6/17/2022 at 12:11 AM

## 2022-06-17 NOTE — PROGRESS NOTES
Group Therapy Note    Date:6/16  Time:2100    Patient attended and participated in [facility]. Patient filled out wrap up group documentation.     Notes:    Eric ALDRIDGE

## 2022-06-17 NOTE — PROGRESS NOTES
[facility] Adult Unit Daily Assessment  Nursing Progress Note    Room: [facility]/607-02   Name: Francesca Hawkins   Age: 39 y.o. Gender: female   Dx: Depression with suicidal ideation  Precautions: suicide risk  Inpatient Status: voluntary       SLEEP:    Sleep Quality Good  Sleep Medications: No   PRN Sleep Meds: No       MEDICAL:    Other PRN Meds: No   Med Compliant: n/a  Accu-Chek: No  Oxygen/CPAP/BiPAP: No  CIWA/CINA: No   PAIN Assessment: none  Side Effects from medication: No    COVID Teaching:    Is Patient experiencing any respiratory symptoms (headache, fever, body aches, cough. Malena Stephens ): no  Patient educated by nursing to practice social distancing, wear masks, wash hands frequently: yes    Medical Bed:   Is patient in a medical bed? no   If medical bed is in use, has nursing secured room while patient is awake and out of the room? NA  Has safety checks by nursing been completed on the bed/room this shift? NA    Protective Factors:    Patient identifies protective factors with nursing staff as follows: Identifies reasons for living: Yes   Supportive Social Network or family: Yes    Belief that suicide is immoral/high spirituality: Yes   Responsibility to family or others/living with family: Yes   Fear of death or dying due to pain and suffering: Yes   Engaged in work or school: Yes     If Patient is unable to identify, reason why? n/a      PSYCH:    Depression: 0   Anxiety: 1  SI denies suicidal ideation   HI Negative for homicidal ideation      AVH:Absent      GENERAL:    Appetite: good   Percent Meals: 100%   Social: Yes   Speech: normal   Appearance: appropriately dressed and healthy looking    GROUP:    Group Participation: Yes  Participation Quality: Minimal    Notes:     Patient is cooperative, Alert and Oriented x4, appears Pleasant. Patient Rates Depression a 0 and Anxiety a 1 on a 0-10 scale, with 10 being the worst. Patient affect is Congruent.  Exhibited a Brightened facial expression; maintained good  eye contact throughout interview. Patient denies having current [address]. Patient is compliant with medications and group. No signs of distress noted; Patient observed socializing with peers after interview.        Electronically signed by Pelon Jennings RN on 6/16/22 at 11:44 PM CDT

## 2022-06-17 NOTE — PROGRESS NOTES
Group Therapy Note    Start Time: 800  End Time:  782  Number of Participants: 9    Type of Group: Community Meeting       Patient's Goal:  \"going home\"      Notes:      Participation Level:  Active Listener       Participation Quality: Appropriate      Thought Process/Content: Logical      Affective Functioning: Congruent      Mood: calm      Level of consciousness:  Alert      Modes of Intervention: Support      Discipline Responsible: Behavioral Health Tech II      Signature:  Miguel Mayen

## 2022-06-17 NOTE — PROGRESS NOTES
Discharge Note     Pt discharging on this date. Pt denies SI, HI, and AVH at this time. Pt reports improvement in behavior and is leaving unit in overall good condition. SW and pt discussed pt's follow up appointments and importance of attending appointments as scheduled, pt voiced understanding and agreement. Pt and SW also discussed pt safety plan and pt able to verbally identify: warning signs, coping strategies, places and people that help make the pt feel better/distract negative thoughts, friends/family/agencies/professionals the pt can reach out to in a crisis, and something that is important to the pt/worth living for. Pt provided the national suicide prevention hotline number (9-153-322-809-814-9013) as well as local community behavioral health [facility]) crisis number for emergencies (7-735-178-877-776-8405). Pt to follow up with:  [address]) on 06__/21__/22 @ 3:00pm. Patient will follow up with [address] for medication management, patient will be seen on 06__/28__/22 @ 10:00am for the med management appt.      Referral to out patient tobacco cessation counseling treatment:    Patient refused referral to outpatient tobacco cessation counseling    SW offered to assist pt with transportation, pt reports that she has transportation home

## 2022-06-17 NOTE — PROGRESS NOTES
Treatment Team Note:     Therapist met with treatment team to discuss patients treatment and discharge plans.      Progress/Behavior/Group Attendance: TBD     Target Symptoms/Reason for admission: Patient admitted to [facility] due to:. female who presents to the emergency department for mental health evaluation. Cristin Winters has had multiple stressors recently. Sharon Nelsonot that she had a baby about 3 months ago.  A little over a month ago she and fiancé and baby all had COVID.  She also developed Bell's palsy during COVID about a month ago. Dane Fields has symptoms from this with left-sided facial droop.  Supposed to get  in 2 weeks.  Tonight, got into an argument with joi and became upset and took a walk.  Took a bottle of ibuprofen with her but did not take any of it. Jamesrachelle Martínez, was contemplating overdose at the time.  Admits that she feels like at times she has auditory hallucinations.  Denies any history of mental health problems before.  Tells me she has not seen anyone about depression.  No HI but is having SI.     Diagnoses: Major depressive disorder, recurrent, severe, without psychotic features, Anxiety unspecified, Insomnia unspecified  UDS: Neg  BAL:  Neg     Aftercare Plan: [address]     Pt lives with: boyfriend     Collateral obtained from: parents  On:6/14/22     Family Session: TBA     Depression: 0  Anxiety: 1   SI denies suicidal ideation   HI Negative for homicidal ideation      AVH:Absent     Taking medication:  Rate the quality of sleep:Sleep Quality good  Sleep Medications: no  PRN Sleep Meds:      Misc:

## 2022-06-17 NOTE — PROGRESS NOTES
CSW acknowledges that the patients psychosocial and lifetime CSSR-S was completed. CSW reviewed lifetime C-SSRS and psychosocial. CSW has participated in treatment team and discharge planning for patient.            Electronically signed by KRUPA Rodriguez on 6/17/2022 at 3:45 PM

## 2022-06-17 NOTE — DISCHARGE SUMMARY
Discharge Summary     Patient ID:  Román Winchester  [id number]  1 y.o.  1980    Admit date: 6/13/2022  Discharge date: 6/17/2022    Admitting Physician: Luis Oleary MD   Attending Physician: Qasim Ochoa MD  Discharge Provider: Zuleima Harvey MD     Admission Diagnoses: Bell's palsy [G51.0]  Depression with suicidal ideation [F32. A, R45.851]  Major depressive disorder, recurrent, severe without psychotic features ([address] [F33.2]    Discharge Diagnoses: Major depressive disorder, recurrent, severe, without psychotic features  Anxiety unspecified  Insomnia unspecified  Bell's palsy  Vitamin B12 deficiency  Vitamin D deficiency    Admission Condition: poor    Discharged Condition: stable    Indication for Admission: Suicidal ideations    HPI:  58-year-old white female history of depression, admitted after she attempted after she attempted to overdose on Ibuprofen. This is her first psychiatric admission. UDS negative. She recently had COVID and developed Bell's palsy.      Patient is observed resting in bed. She appears depressed. She denies suicidal ideation at this time. She has been under a lot of stress. She has a 2 months old baby and a 6 yo autistic son. She developed depression after her daughter was born. She has been arguing with her fiancé lately. They are planning to get  in 2 weeks. Patient continues to work in a [employer] center. Patient reports low mood, anhedonia, poor sleep, energy level and appetite. States her mother and fiancee are helping her with children. She feels burnt out. Anxiety level has been very high. Feels that Zoloft is not helping her and makes her feel very tired. Denies mood swings, racing thoughts, decreased need for sleep. Denies AVH and paranoia.  She is open to medication adjustment.     PSYCHIATRIC HISTORY:    Diagnoses: Depression  Suicide attempts/gestures: Denies   Prior hospitalizations: Denies   Medication trials: Zoloft   Mental health contact: Lost to follow-up   Head trauma: Denies    Hospital Course:   Patient was admitted to the adult psych behavioral health floor and was acclimated to the floor. Labs were reviewed and physical exam was completed by Dr. Glenn Lubin and associates. Home medications were reconciled. ANA was obtained and reviewed. Medication changes were made and patient tolerated well with no side effects. During the hospital stay Zoloft was discontinued due to lack of the effect and patient has been started on Wellbutrin  mg once a day for depression. Patient has been started on trazodone 50 mg at bedtime for insomnia with beneficial effect. While in the hospital patient has been diagnosed with vitamin B12 and vitamin D deficiencies and she has been started on vitamin B12 500 MCG daily and vitamin D 50,000 units weekly. Patient attended and participated in groups. The patient did interact well with other patients and staff on the unit. Behaviorally she was not a problem. Patient was compliant with her medications. Patient was sleeping through the night. This patient is not suicidal, homicidal or psychotic at discharge. She does not present a danger to self or others. With the above mentioned medications changes as well as psychotherapeutic interventions, the patient reported considerable improvement in her condition. On 06/17/22 it was therefore decided to discharge the patient, as it was felt that she received maximal benefit from her hospitalization.       Number of antipsychotic medication prescribed at discharge: none  IF MORE THAN ONE IS USED: NA    History of greater than 3 failed multiple monotherapy trials: NA  Monotherapy taper plan/ cross taper in progress: NA  Augmentation of Clozapine: NA    Referral to addiction treatment: NA    Prescription for Alcohol or Drug Disorder Medication: NA    Prescription for Tobacco Cessation medication: NA    If no prescriptions for Tobacco Cessation must document why: NA    Consults: Internal medicine    Significant Diagnostic Studies:     Lab Results   Component Value Date    WBC 6.6 06/13/2022    HGB 12.4 06/13/2022    HCT 39.6 06/13/2022    MCV 95.4 06/13/2022     06/13/2022     Lab Results   Component Value Date     06/13/2022    K 3.6 06/13/2022     06/13/2022    CO2 21 (L) 06/13/2022    BUN 11 06/13/2022    CREATININE 0.8 06/13/2022    GLUCOSE 109 06/13/2022    CALCIUM 8.9 06/13/2022    PROT 6.7 06/13/2022    LABALBU 3.9 06/13/2022    BILITOT <0.2 06/13/2022    ALKPHOS 46 06/13/2022    AST 13 06/13/2022    ALT 13 06/13/2022    LABGLOM >60 06/13/2022    GFRAA >59 06/13/2022    GLOB 2.8 09/28/2021       Lab Results   Component Value Date    DKNRAZIC87 312 06/15/2022     Lab Results   Component Value Date    VITD25 14.2 (L) 06/15/2022       Treatments: therapies: RN and SW    Mental Status Examination:  Alert, Oriented X 4  Appearance:  Proper Grooming and Hygiene  Speech with Regular Rate and Rhythm  Eye Contact:  Good  No Psychomotor Agitation/Retardation Noted  Attitude:  Cooperative  Mood:  \"Good\"  Affective: Congruent, appropriate to the situation, with a normal range and intensity  Thought Processes:  Coherently communicated, logical and goal oriented  Thought Content:  No Suicidal Ideation, No Homicidal Ideation, No Auditory or Visual Hallucinations, No Overt Delusions  Insight: Improved  Judgement: Improved  Memory is intact for both remote and recent  Intellectual Functioning:  Within the [address] of Knowledge:  Adequate  Attention and Concentration:  Adequate      Discharge Exam:  Please, see medical note    Disposition: home    Patient Instructions:   Current Discharge Medication List      START taking these medications    Details   buPROPion (WELLBUTRIN XL) 150 MG extended release tablet Take 1 tablet by mouth daily  Qty: 30 tablet, Refills: 1      Ergocalciferol (VITAMIN D) 35949 units CAPS Take 50,000 Units by mouth once a week for 11 doses  Qty: 11 capsule, Refills: 0      vitamin B-12 500 MCG tablet Take 1 tablet by mouth daily  Qty: 30 tablet, Refills: 2      traZODone (DESYREL) 50 MG tablet Take 1 tablet by mouth nightly  Qty: 30 tablet, Refills: 1         CONTINUE these medications which have NOT CHANGED    Details   medroxyPROGESTERone (DEPO-PROVERA) 150 MG/ML injection Inject 1 mL into the muscle every 3 months  Qty: 1 mL, Refills: 3      ferrous sulfate (IRON 325) 325 (65 Fe) MG tablet Take 1 tablet by mouth 2 times daily  Qty: 60 tablet, Refills: 5         STOP taking these medications       sertraline (ZOLOFT) 25 MG tablet Comments:   Reason for Stopping:         Prenatal Vit-Fe Fumarate-FA (PRENATAL VITAMIN PO) Comments:   Reason for Stopping:             Activity: activity as tolerated  Diet: regular diet  Wound Care: none needed    Follow-up with [place] [address] provider in 2 weeks.     Time worked: More than 31 minutes    Participation: good    Electronically signed by Zuleima Harvey MD on 6/17/2022 at 9:21 AM

## 2022-06-17 NOTE — DISCHARGE INSTR - DIET

## 2022-06-17 NOTE — PLAN OF CARE
Group Therapy Note    Date: 6/17/2022  Start Time: 1000  End Time:  1030  Number of Participants: 3    Type of Group: Psychoeducation    Wellness Binder Information  Module Name:  emotional wellness  Session Number:  1    Patient's Goal:  validation of feelings    Notes:  pt acknowledged to have feelings validated it may be necessary to share feelings with others.     Status After Intervention:  Improved    Participation Level: Interactive    Participation Quality: Appropriate, Attentive, and Sharing      Speech:  normal      Thought Process/Content: Logical      Affective Functioning: Congruent      Mood: congruent      Level of consciousness:  Alert, Oriented x4, and Attentive      Response to Learning: Able to verbalize current knowledge/experience      Endings: None Reported    Modes of Intervention: Education      Discipline Responsible: Psychoeducational Specialist      Signature:  Darren Leventhal

## 2022-06-17 NOTE — PROGRESS NOTES
Progress Note  Eunice Villegas  6/17/2022 3:02 PM  Subjective:   Admit Date:   6/13/2022      CC/ADMIT DX:       Interval History:   Reviewed overnight events and nursing notes. She has no new physical complaints. I have reviewed all labs/diagnostics from the last 24hrs. ROS:   I have done a 10 point ROS and all are negative, except what is mentioned in the HPI. No diet orders on file    Medications:             Objective:   Vitals: /85   Pulse 84   Temp 99.5 °F (37.5 °C)   Resp 20   Ht 5' 9\" (1.753 m)   Wt 175 lb (79.4 kg)   SpO2 98%   BMI 25.84 kg/m²  No intake or output data in the 24 hours ending 06/17/22 1502  General appearance: alert and cooperative with exam  Extremities: extremities normal, atraumatic, no cyanosis or edema  Neurologic:  No obvious focal neurologic deficits. Skin: no rashes    Assessment and Plan:   Principal Problem:    Depression with suicidal ideation  Active Problems:    Major depressive disorder, recurrent, severe without psychotic features ([address]  Resolved Problems:    * No resolved hospital problems. *    Vit D Def    Plan:  1. Continue present medication(s)   2. She is medically stable. I will monitor for any changes or concerns. 3.  Follow with Psych      Discharge planning:   her home     Reviewed treatment plans with the patient and/or family.              Electronically signed by Clara Durham MD on 6/17/2022 at 3:02 PM

## 2022-08-09 ENCOUNTER — NURSE ONLY (OUTPATIENT)
Dept: OBGYN CLINIC | Age: 42
End: 2022-08-09
Payer: MEDICAID

## 2022-08-09 DIAGNOSIS — Z30.42 ENCOUNTER FOR DEPO-PROVERA CONTRACEPTION: Primary | ICD-10-CM

## 2022-08-10 PROCEDURE — 96372 THER/PROPH/DIAG INJ SC/IM: CPT | Performed by: NURSE PRACTITIONER

## 2022-08-10 RX ORDER — MEDROXYPROGESTERONE ACETATE 150 MG/ML
150 INJECTION, SUSPENSION INTRAMUSCULAR ONCE
Status: COMPLETED | OUTPATIENT
Start: 2022-08-10 | End: 2022-08-10

## 2022-08-10 RX ADMIN — MEDROXYPROGESTERONE ACETATE 150 MG: 150 INJECTION, SUSPENSION INTRAMUSCULAR at 10:11

## 2022-08-10 NOTE — PATIENT INSTRUCTIONS
Patient Education        Learning About Birth Control: The Shot  What is the shot? The shot is used to prevent pregnancy. You get the shot in your upper arm or rear end (buttocks). The shot gives you a dose of the hormone progestin. Theshot is often called by its brand name, Depo-Provera. Progestin prevents pregnancy in these ways: It thickens the mucus in the cervix. This makes it hard for sperm to travel into the uterus. It also thins the lining of the uterus, which makes it harder for a fertilized egg to attach to the uterus. Progestin can sometimes stop the ovaries from releasing an eggeach month (ovulation). The shot provides birth control for 3 months at a time. You then need anothershot. The shot may cause bone loss. Talk to your doctor about the risks and benefits. How well does it work? In the first year of use: When the shot is used exactly as directed, fewer than 1 woman out of 100 has an unplanned pregnancy. When the shot is not used exactly as directed, 6 women out of 100 have an unplanned pregnancy. Be sure to tell your doctor about any health problems you have or medicines you take. He or she can help you choose the birth control method that is right foryou. What are the advantages of the shot? The shot is one of the most effective methods of birth control. It's convenient. You need to get a shot only once every 3 months to prevent pregnancy. You don't have to interrupt sex to protect against pregnancy. It prevents pregnancy for 3 months at a time. You don't have to worry about birth control for this time. It's safe to use while breastfeeding. The shot may reduce heavy bleeding and cramping. The shot doesn't contain estrogen. So you can use it if you don't want to take estrogen or can't take estrogen because you have certain health problems or concerns. What are the disadvantages of the shot?   The shot doesn't protect against sexually transmitted infections (STIs), such as herpes or HIV/AIDS. If you aren't sure if your sex partner might have an STI, use a condom to protect against disease. The shot may cause bone loss in some women. Talk to your doctor about the risks and benefits. The shot is needed every 3 months. Any side effects may last 3 months or longer. The shot may cause irregular periods, or you may have spotting between periods. You may also stop getting a period. Some women see having no period as an advantage. It may cause mood changes, less interest in sex, or weight gain. If you want to get pregnant, it may take up to 18 months after you stop getting the shot. This is because the hormones the shot provided have to leave your system, and your body has to readjust.  Where can you learn more? Go to https://[employer]. org and sign in to your [employer] account. Enter P059 in the [employer] box to learn more about \"Learning About Birth Control: The Shot. \"     If you do not have an account, please click on the \"Sign Up Now\" link. Current as of: February 23, 2022               Content Version: 13.3  © 0946-5011 [employer]wise, Incorporated. Care instructions adapted under license by [facility] ([facility]). If you have questions about a medical condition or this instruction, always ask your healthcare professional. [address] any warranty or liability for your use of this information.

## 2022-08-10 NOTE — PROGRESS NOTES
After obtaining consent, and per orders of Milagro Markham, injection of Depo Provera given in Left deltoid by Margarita Marcus. Patient instructed to remain in clinic for 20 minutes afterwards, and to report any adverse reaction to me immediately. The injection was given on 08/09/2022 but was charted on 08/10/2022.  Margarita/RACIEL

## 2022-10-27 ENCOUNTER — OFFICE VISIT (OUTPATIENT)
Dept: OBGYN CLINIC | Age: 42
End: 2022-10-27
Payer: MEDICAID

## 2022-10-27 VITALS
HEIGHT: 69 IN | SYSTOLIC BLOOD PRESSURE: 134 MMHG | HEART RATE: 56 BPM | WEIGHT: 199 LBS | DIASTOLIC BLOOD PRESSURE: 81 MMHG | BODY MASS INDEX: 29.47 KG/M2

## 2022-10-27 DIAGNOSIS — Z12.39 ENCOUNTER FOR SCREENING BREAST EXAMINATION: ICD-10-CM

## 2022-10-27 DIAGNOSIS — N92.6 IRREGULAR MENSES: ICD-10-CM

## 2022-10-27 DIAGNOSIS — D25.9 UTERINE LEIOMYOMA, UNSPECIFIED LOCATION: ICD-10-CM

## 2022-10-27 DIAGNOSIS — Z12.31 ENCOUNTER FOR SCREENING MAMMOGRAM FOR MALIGNANT NEOPLASM OF BREAST: ICD-10-CM

## 2022-10-27 DIAGNOSIS — Z30.42 ENCOUNTER FOR SURVEILLANCE OF INJECTABLE CONTRACEPTIVE: ICD-10-CM

## 2022-10-27 DIAGNOSIS — Z01.419 ENCOUNTER FOR GYNECOLOGICAL EXAMINATION WITHOUT ABNORMAL FINDING: Primary | ICD-10-CM

## 2022-10-27 DIAGNOSIS — Z12.4 SCREENING FOR CERVICAL CANCER: ICD-10-CM

## 2022-10-27 PROCEDURE — 99396 PREV VISIT EST AGE 40-64: CPT | Performed by: NURSE PRACTITIONER

## 2022-10-27 PROCEDURE — 96372 THER/PROPH/DIAG INJ SC/IM: CPT | Performed by: NURSE PRACTITIONER

## 2022-10-27 RX ORDER — MEDROXYPROGESTERONE ACETATE 150 MG/ML
150 INJECTION, SUSPENSION INTRAMUSCULAR ONCE
Status: COMPLETED | OUTPATIENT
Start: 2022-10-27 | End: 2022-10-27

## 2022-10-27 RX ADMIN — MEDROXYPROGESTERONE ACETATE 150 MG: 150 INJECTION, SUSPENSION INTRAMUSCULAR at 11:30

## 2022-10-27 ASSESSMENT — ENCOUNTER SYMPTOMS
DIARRHEA: 0
EYES NEGATIVE: 1
CONSTIPATION: 0
RESPIRATORY NEGATIVE: 1
ALLERGIC/IMMUNOLOGIC NEGATIVE: 1
GASTROINTESTINAL NEGATIVE: 1

## 2022-10-27 NOTE — PROGRESS NOTES
Dmitry Nelson is a 43 y.o. female who presents today for her medical conditions/ complaints as noted below. Dmitry Nelson is c/o of Annual Exam and Injections (Depo )        HPI  Pt presents for annual exam and pap smear. Needs order for screening mammogram. Using depo shot, but having some irregular bleeding, sometimes 2 weeks out of the month. Has had depo shot x2. Hx of uterine fibroids. Denies any pain. Wanting to continue depo shot for now. Mammo:NA   Pap smear:  Contraception:Depo Provera    P:2  Ab:0  Bone density:NA  Colonoscopy:NA   Patient's last menstrual period was 10/10/2022 (approximate). [id number]    Past Medical History:   Diagnosis Date    Anemia     Depression with suicidal ideation 2022    GBS carrier     Uterine fibroid      History reviewed. No pertinent surgical history. Family History   Problem Relation Age of Onset    Colon Cancer Maternal Aunt     Cancer Paternal Uncle         melanoma     Cancer Maternal Grandmother         uterine/ovarian     Social History     Tobacco Use    Smoking status: Never    Smokeless tobacco: Never   Substance Use Topics    Alcohol use: No       Current Outpatient Medications   Medication Sig Dispense Refill    buPROPion (WELLBUTRIN XL) 150 MG extended release tablet Take 1 tablet by mouth daily 30 tablet 1    vitamin B-12 500 MCG tablet Take 1 tablet by mouth daily 30 tablet 2    traZODone (DESYREL) 50 MG tablet Take 1 tablet by mouth nightly 30 tablet 1    medroxyPROGESTERone (DEPO-PROVERA) 150 MG/ML injection Inject 1 mL into the muscle every 3 months 1 mL 3    ferrous sulfate (IRON 325) 325 (65 Fe) MG tablet Take 1 tablet by mouth 2 times daily 60 tablet 5     No current facility-administered medications for this visit. Allergies   Allergen Reactions    Codeine     Lactose Intolerance (Gi)      Vitals:    10/27/22 0941   BP: 134/81   Pulse: 56     Body mass index is 29.39 kg/m². Review of Systems   Constitutional: Negative. HENT: Negative. Eyes: Negative. Respiratory: Negative. Cardiovascular: Negative. Gastrointestinal: Negative. Negative for constipation and diarrhea. Endocrine: Negative. Genitourinary:  Positive for menstrual problem. Negative for frequency and urgency. Musculoskeletal: Negative. Skin: Negative. Allergic/Immunologic: Negative. Neurological: Negative. Hematological: Negative. Psychiatric/Behavioral: Negative. All other systems reviewed and are negative. Physical Exam  Vitals and nursing note reviewed. Constitutional:       Appearance: She is well-developed. HENT:      Head: Normocephalic. Neck:      Thyroid: No thyroid mass or thyromegaly. Cardiovascular:      Rate and Rhythm: Normal rate and regular rhythm. Pulmonary:      Effort: Pulmonary effort is normal.      Breath sounds: Normal breath sounds. Chest:   Breasts:     Right: No inverted nipple, mass, nipple discharge or skin change. Left: No inverted nipple, mass, nipple discharge or skin change. Abdominal:      Palpations: Abdomen is soft. There is no mass. Tenderness: There is no abdominal tenderness. Genitourinary:     General: Normal vulva. Vagina: Bleeding (menstrual blood noted) present. No vaginal discharge or erythema. Cervix: No cervical motion tenderness. Uterus: Normal. Enlarged (approx 8-10 weeks size). Adnexa:         Right: No mass or tenderness. Left: No mass or tenderness. Comments: Pap collected  Musculoskeletal:         General: Normal range of motion. Cervical back: Normal range of motion and neck supple. Skin:     General: Skin is warm and dry. Neurological:      Mental Status: She is alert and oriented to person, place, and time.    Psychiatric:         Attention and Perception: Attention normal.         Mood and Affect: Mood normal.         Speech: Speech normal.         Behavior: Behavior normal.         Thought Content: Thought content normal.         Cognition and Memory: Cognition normal.         Judgment: Judgment normal.        Diagnosis Orders   1. Encounter for gynecological examination without abnormal finding        2. Screening for cervical cancer  PAP SMEAR    Human papillomavirus (HPV) DNA probe thin prep high risk      3. Encounter for screening mammogram for malignant neoplasm of breast  SHAY DIGITAL SCREEN W OR WO CAD BILATERAL      4. Encounter for screening breast examination            MEDICATIONS:  No orders of the defined types were placed in this encounter. ORDERS:  Orders Placed This Encounter   Procedures    SHAY DIGITAL SCREEN W OR WO CAD BILATERAL    PAP SMEAR    Human papillomavirus (HPV) DNA probe thin prep high risk       PLAN:  Pap collected  Ordered screening mammogram today  Depo shot given  Ordered TVUS to f/u on fibroids  Will consider other options for cycle control if bleeding persists- pt agreeable    Patient Instructions   Patient Education       Breast Self-Exam: Care Instructions  Your Care Instructions     A breast self-exam is when you check your breasts for lumps or changes. This regular exam helps you learn how your breasts normally look and feel. Most breast problems or changes are not because of cancer. Breast self-exam is not a substitute for a mammogram. Having regular breast exams by your doctor and regular mammograms improve your chances of finding any problems with your breasts. Some women set a time each month to do a step-by-step breast self-exam. Other women like a less formal system. They might look at their breasts as they brush their teeth, or feel their breasts once in a while in the shower. If you notice a change in your breast, tell your doctor. Follow-up care is a key part of your treatment and safety. Be sure to make and go to all appointments, and call your doctor if you are having problems.  It's also a good idea to know your test results and keep a list of the medicines you take. How do you do a breast self-exam?  The best time to examine your breasts is usually one week after your menstrual period begins. Your breasts should not be tender then. If you do not have periods, you might do your exam on a day of the month that is easy to remember. To examine your breasts:  Remove all your clothes above the waist and lie down. When you are lying down, your breast tissue spreads evenly over your chest wall, which makes it easier to feel all your breast tissue. Use the pads--not the fingertips--of the 3 middle fingers of your left hand to check your right breast. Move your fingers slowly in small coin-sized circles that overlap. Use three levels of pressure to feel of all your breast tissue. Use light pressure to feel the tissue close to the skin surface. Use medium pressure to feel a little deeper. Use firm pressure to feel your tissue close to your breastbone and ribs. Use each pressure level to feel your breast tissue before moving on to the next spot. Check your entire breast, moving up and down as if following a strip from the collarbone to the bra line, and from the armpit to the ribs. Repeat until you have covered the entire breast.  Repeat this procedure for your left breast, using the pads of the 3 middle fingers of your right hand. To examine your breasts while in the shower:  Place one arm over your head and lightly soap your breast on that side. Using the pads of your fingers, gently move your hand over your breast (in the strip pattern described above), feeling carefully for any lumps or changes. Repeat for the other breast.  Have your doctor inspect anything you notice to see if you need further testing. Where can you learn more? Go to https://[employer]. org and sign in to your [employer] account. Enter P148 in the [facility] box to learn more about \"Breast Self-Exam: Care Instructions. \"     If you do not have an account,

## 2022-10-27 NOTE — PROGRESS NOTES
Pt presents today for pap smear and breast exam. She states she has had vaginal bleeding for at least 2 weeks states not as heavy as usually since being on the depo shot. She is not taking her wellbutrin like she is suppose to she forgets sometimes. Mammo:NA   Pap smear:  Contraception:Depo Provera    P:2  Ab:0  Bone density:NA  Colonoscopy:NA       After obtaining consent, and per orders of Reece Gonzalez, injection of Depo Provera given in Left deltoid by Margarita Marcus. Patient instructed to remain in clinic for 20 minutes afterwards, and to report any adverse reaction to me immediately.

## 2022-10-27 NOTE — PATIENT INSTRUCTIONS
Patient Education        Breast Self-Exam: Care Instructions  Your Care Instructions     A breast self-exam is when you check your breasts for lumps or changes. This regular exam helps you learn how your breasts normally look and feel. Most breast problems or changes are not because of cancer. Breast self-exam is not a substitute for a mammogram. Having regular breast exams by your doctor and regular mammograms improve your chances of finding any problems with your breasts. Some women set a time each month to do a step-by-step breast self-exam. Other women like a less formal system. They might look at their breasts as they brush their teeth, or feel their breasts once in a while in the shower. If you notice a change in your breast, tell your doctor. Follow-up care is a key part of your treatment and safety. Be sure to make and go to all appointments, and call your doctor if you are having problems. It's also a good idea to know your test results and keep a list of the medicines you take. How do you do a breast self-exam?  The best time to examine your breasts is usually one week after your menstrual period begins. Your breasts should not be tender then. If you do not have periods, you might do your exam on a day of the month that is easy to remember. To examine your breasts:  Remove all your clothes above the waist and lie down. When you are lying down, your breast tissue spreads evenly over your chest wall, which makes it easier to feel all your breast tissue. Use the pads--not the fingertips--of the 3 middle fingers of your left hand to check your right breast. Move your fingers slowly in small coin-sized circles that overlap. Use three levels of pressure to feel of all your breast tissue. Use light pressure to feel the tissue close to the skin surface. Use medium pressure to feel a little deeper. Use firm pressure to feel your tissue close to your breastbone and ribs.  Use each pressure level to feel your breast tissue before moving on to the next spot. Check your entire breast, moving up and down as if following a strip from the collarbone to the bra line, and from the armpit to the ribs. Repeat until you have covered the entire breast.  Repeat this procedure for your left breast, using the pads of the 3 middle fingers of your right hand. To examine your breasts while in the shower:  Place one arm over your head and lightly soap your breast on that side. Using the pads of your fingers, gently move your hand over your breast (in the strip pattern described above), feeling carefully for any lumps or changes. Repeat for the other breast.  Have your doctor inspect anything you notice to see if you need further testing. Where can you learn more? Go to https://[email or web address].[employer]. org and sign in to your [employer] account. Enter P148 in the Smart Adventure box to learn more about \"Breast Self-Exam: Care Instructions. \"     If you do not have an account, please click on the \"Sign Up Now\" link. Current as of: November 22, 2021               Content Version: 13.4  © 3013-1688 Healthwise, Incorporated. Care instructions adapted under license by [facility] [place] ([facility]). If you have questions about a medical condition or this instruction, always ask your healthcare professional. [address]  any warranty or liability for your use of this information.

## 2022-11-09 ENCOUNTER — OFFICE VISIT (OUTPATIENT)
Dept: FAMILY MEDICINE CLINIC | Facility: CLINIC | Age: 42
End: 2022-11-09

## 2022-11-09 ENCOUNTER — LAB (OUTPATIENT)
Dept: LAB | Facility: HOSPITAL | Age: 42
End: 2022-11-09

## 2022-11-09 VITALS
BODY MASS INDEX: 29.62 KG/M2 | WEIGHT: 200 LBS | HEART RATE: 64 BPM | DIASTOLIC BLOOD PRESSURE: 78 MMHG | RESPIRATION RATE: 20 BRPM | SYSTOLIC BLOOD PRESSURE: 120 MMHG | OXYGEN SATURATION: 100 % | TEMPERATURE: 97.7 F | HEIGHT: 69 IN

## 2022-11-09 DIAGNOSIS — H65.93 FLUID LEVEL BEHIND TYMPANIC MEMBRANE OF BOTH EARS: ICD-10-CM

## 2022-11-09 DIAGNOSIS — R50.9 FEVER, UNSPECIFIED FEVER CAUSE: ICD-10-CM

## 2022-11-09 DIAGNOSIS — E66.3 OVERWEIGHT (BMI 25.0-29.9): ICD-10-CM

## 2022-11-09 DIAGNOSIS — R07.0 PAIN IN THROAT: ICD-10-CM

## 2022-11-09 DIAGNOSIS — J01.00 ACUTE NON-RECURRENT MAXILLARY SINUSITIS: Primary | ICD-10-CM

## 2022-11-09 LAB
FLUAV AG NPH QL: NEGATIVE
FLUBV AG NPH QL IA: NEGATIVE
S PYO AG THROAT QL: NEGATIVE

## 2022-11-09 PROCEDURE — 87804 INFLUENZA ASSAY W/OPTIC: CPT

## 2022-11-09 PROCEDURE — 87880 STREP A ASSAY W/OPTIC: CPT

## 2022-11-09 PROCEDURE — 87081 CULTURE SCREEN ONLY: CPT

## 2022-11-09 PROCEDURE — 99213 OFFICE O/P EST LOW 20 MIN: CPT | Performed by: NURSE PRACTITIONER

## 2022-11-09 RX ORDER — METHYLPREDNISOLONE 4 MG/1
TABLET ORAL
Qty: 1 EACH | Refills: 0 | Status: SHIPPED | OUTPATIENT
Start: 2022-11-09

## 2022-11-09 RX ORDER — AZITHROMYCIN 250 MG/1
TABLET, FILM COATED ORAL
Qty: 6 TABLET | Refills: 0 | Status: SHIPPED | OUTPATIENT
Start: 2022-11-09

## 2022-11-09 RX ORDER — FLUOXETINE 10 MG/1
CAPSULE ORAL
COMMUNITY
Start: 2022-10-05

## 2022-11-09 NOTE — PROGRESS NOTES
"Chief Complaint  Sore Throat, Cough, and Headache    Subjective    History of Present Illness      Patient presents to [facility] for   History of Present Illness  Pt is here today C/o of cough, sore throat and nasal congestion x 2 weeks.  No fever reported or detected.  Pt states that her symptoms are worse at night.  Pt also states that she is having pain in her left eye and the left nostril.  Pt thinks the pain might be related to the [place] Palsy she had in May.       Review of Systems   Constitutional: Negative.    HENT: Positive for sore throat.    Eyes: Negative.    Respiratory: Positive for cough.    Cardiovascular: Negative.    Gastrointestinal: Negative.    Endocrine: Negative.    Genitourinary: Negative.    Musculoskeletal: Negative.    Skin: Negative.    Allergic/Immunologic: Negative.    Neurological: Negative.    Hematological: Negative.    Psychiatric/Behavioral: Negative.        I have reviewed and agree with the HPI and ROS information as above.  Marine Concepcion, APRN     Objective   Vital Signs:   /78   Pulse 64   Temp 97.7 °F (36.5 °C)   Resp 20   Ht 175.3 cm (69\")   Wt 90.7 kg (200 lb)   SpO2 100%   BMI 29.53 kg/m²     BMI is >= 25 and <30. (Overweight) The following options were offered after discussion;: weight loss educational material (shared in after visit summary)      Physical Exam  Constitutional:       Appearance: Normal appearance. She is well-developed.      Comments: overweight   HENT:      Head: Normocephalic and atraumatic.      Right Ear: External ear normal. A middle ear effusion is present.      Left Ear: External ear normal. A middle ear effusion is present.      Nose: Nose normal. No nasal tenderness or congestion.      Mouth/Throat:      Lips: Pink. No lesions.      Mouth: Mucous membranes are moist. No oral lesions.      Dentition: Normal dentition.      Pharynx: Oropharynx is clear. No pharyngeal swelling, oropharyngeal exudate or " posterior oropharyngeal erythema.   Eyes:      General: Lids are normal. Vision grossly intact. No scleral icterus.        Right eye: No discharge.         Left eye: No discharge.      Extraocular Movements: Extraocular movements intact.      Conjunctiva/sclera: Conjunctivae normal.      Right eye: Right conjunctiva is not injected.      Left eye: Left conjunctiva is not injected.      Pupils: Pupils are equal, round, and reactive to light.   Cardiovascular:      Rate and Rhythm: Normal rate and regular rhythm.      Heart sounds: Normal heart sounds. No murmur heard.    No gallop.   Pulmonary:      Effort: Pulmonary effort is normal.      Breath sounds: Normal breath sounds and air entry. No wheezing, rhonchi or rales.   Musculoskeletal:         General: No tenderness or deformity. Normal range of motion.      Cervical back: Full passive range of motion without pain, normal range of motion and neck supple.      Right lower leg: No edema.      Left lower leg: No edema.   Skin:     General: Skin is warm and dry.      Coloration: Skin is not jaundiced.      Findings: No rash.   Neurological:      Mental Status: She is alert and oriented to person, place, and time.      Cranial Nerves: Cranial nerves are intact.      Sensory: Sensation is intact.      Motor: Motor function is intact.      Coordination: Coordination is intact.      Gait: Gait is intact.   Psychiatric:         Attention and Perception: Attention normal.         Mood and Affect: Mood and affect normal.         Behavior: Behavior is not hyperactive. Behavior is cooperative.         Thought Content: Thought content normal.         Judgment: Judgment normal.           Result Review  Data Reviewed:   The following data was reviewed by: CHESTER Lance on 11/09/2022:  Influenza Antigen, Rapid - Swab, Nasopharynx (11/09/2022 12:01)  Rapid Strep A Screen - Swab, Throat (11/09/2022 12:01)             Assessment and Plan      Problem List Items Addressed  This Visit        Endocrine and Metabolic    Overweight (BMI 25.0-29.9)   Other Visit Diagnoses     Acute non-recurrent maxillary sinusitis    -  Primary    Relevant Medications    azithromycin (Zithromax Z-Paul) 250 MG tablet    methylPREDNISolone (MEDROL) 4 MG dose pack    Pain in throat        Fever, unspecified fever cause        Fluid level behind tympanic membrane of both ears            Pt here today with c/o sore throat, cough, sinus drainage, sneezing, body aches, and chills.  Symptoms began 2 weeks ago, but sore throat symptoms started more recently.  She denies any known fever.  She states her cough is productive at times, but mostly dry.  Fluid noted behind bilateral TMs, not infected.    Plan:    1.  Will swab for strep and flu: Strep and flu negative.  2.  Start Zpak and medrol dose pack.   3.  Recommended flonase nasal spray and daily allergy medication.  4.  F/u prn if symptoms do not improve or become worse.       Follow Up   Return if symptoms worsen or fail to improve.  Patient was given instructions and counseling regarding her condition or for health maintenance advice. Please see specific information pulled into the AVS if appropriate.

## 2022-11-11 ENCOUNTER — TELEPHONE (OUTPATIENT)
Dept: FAMILY MEDICINE CLINIC | Facility: CLINIC | Age: 42
End: 2022-11-11

## 2022-11-11 LAB — BACTERIA SPEC AEROBE CULT: NORMAL

## 2022-12-30 ENCOUNTER — HOSPITAL ENCOUNTER (EMERGENCY)
Age: 42
Discharge: HOME | End: 2022-12-30
Attending: EMERGENCY MEDICINE
Payer: MEDICAID

## 2022-12-30 ENCOUNTER — TELEPHONE (OUTPATIENT)
Dept: OBGYN CLINIC | Age: 42
End: 2022-12-30

## 2022-12-30 VITALS
WEIGHT: 199 LBS | HEIGHT: 69 IN | TEMPERATURE: 98.7 F | BODY MASS INDEX: 29.47 KG/M2 | RESPIRATION RATE: 18 BRPM | DIASTOLIC BLOOD PRESSURE: 85 MMHG | OXYGEN SATURATION: 100 % | HEART RATE: 75 BPM | SYSTOLIC BLOOD PRESSURE: 143 MMHG

## 2022-12-30 DIAGNOSIS — N93.9 VAGINAL BLEEDING: Primary | ICD-10-CM

## 2022-12-30 LAB
ALBUMIN SERPL-MCNC: 4.4 G/DL (ref 3.5–5.2)
ALP BLD-CCNC: 62 U/L (ref 35–104)
ALT SERPL-CCNC: 10 U/L (ref 5–33)
ANION GAP SERPL CALCULATED.3IONS-SCNC: 12 MMOL/L (ref 7–19)
AST SERPL-CCNC: 14 U/L (ref 5–32)
BASOPHILS ABSOLUTE: 0.1 K/UL (ref 0–0.2)
BASOPHILS RELATIVE PERCENT: 0.8 % (ref 0–1)
BILIRUB SERPL-MCNC: 0.4 MG/DL (ref 0.2–1.2)
BUN BLDV-MCNC: 8 MG/DL (ref 6–20)
CALCIUM SERPL-MCNC: 9.2 MG/DL (ref 8.6–10)
CHLORIDE BLD-SCNC: 105 MMOL/L (ref 98–111)
CO2: 21 MMOL/L (ref 22–29)
CREAT SERPL-MCNC: 0.6 MG/DL (ref 0.5–0.9)
EOSINOPHILS ABSOLUTE: 0.1 K/UL (ref 0–0.6)
EOSINOPHILS RELATIVE PERCENT: 2.1 % (ref 0–5)
GFR SERPL CREATININE-BSD FRML MDRD: >60 ML/MIN/{1.73_M2}
GLUCOSE BLD-MCNC: 103 MG/DL (ref 74–109)
HCG(URINE) PREGNANCY TEST: NEGATIVE
HCT VFR BLD CALC: 41.5 % (ref 37–47)
HEMOGLOBIN: 13.3 G/DL (ref 12–16)
IMMATURE GRANULOCYTES #: 0.1 K/UL
LYMPHOCYTES ABSOLUTE: 2.1 K/UL (ref 1.1–4.5)
LYMPHOCYTES RELATIVE PERCENT: 34.1 % (ref 20–40)
MCH RBC QN AUTO: 29.4 PG (ref 27–31)
MCHC RBC AUTO-ENTMCNC: 32 G/DL (ref 33–37)
MCV RBC AUTO: 91.8 FL (ref 81–99)
MONOCYTES ABSOLUTE: 0.4 K/UL (ref 0–0.9)
MONOCYTES RELATIVE PERCENT: 6.9 % (ref 0–10)
NEUTROPHILS ABSOLUTE: 3.5 K/UL (ref 1.5–7.5)
NEUTROPHILS RELATIVE PERCENT: 55 % (ref 50–65)
PDW BLD-RTO: 13.6 % (ref 11.5–14.5)
PLATELET # BLD: 264 K/UL (ref 130–400)
PMV BLD AUTO: 9.4 FL (ref 9.4–12.3)
POTASSIUM REFLEX MAGNESIUM: 3.9 MMOL/L (ref 3.5–5)
RBC # BLD: 4.52 M/UL (ref 4.2–5.4)
SODIUM BLD-SCNC: 138 MMOL/L (ref 136–145)
TOTAL PROTEIN: 7.5 G/DL (ref 6.6–8.7)
WBC # BLD: 6.3 K/UL (ref 4.8–10.8)

## 2022-12-30 PROCEDURE — 99283 EMERGENCY DEPT VISIT LOW MDM: CPT

## 2022-12-30 PROCEDURE — 36415 COLL VENOUS BLD VENIPUNCTURE: CPT

## 2022-12-30 PROCEDURE — 80053 COMPREHEN METABOLIC PANEL: CPT

## 2022-12-30 PROCEDURE — 84703 CHORIONIC GONADOTROPIN ASSAY: CPT

## 2022-12-30 PROCEDURE — 85025 COMPLETE CBC W/AUTO DIFF WBC: CPT

## 2022-12-30 ASSESSMENT — PAIN - FUNCTIONAL ASSESSMENT: PAIN_FUNCTIONAL_ASSESSMENT: NONE - DENIES PAIN

## 2022-12-30 NOTE — ED PROVIDER NOTES
[facility] EMERGENCY DEPT  EMERGENCY DEPARTMENT ENCOUNTER      Pt Name: Sherleen Dakin  MRN: [id number]  [place] 1980  Date of evaluation: 12/30/2022  Provider: Sol Galvez, [address] [place] [address]       Chief Complaint   Patient presents with    Vaginal Bleeding     Pt notes passing blood clots currently on depo  has had 3 injections pt notes bleeding for 2 months         HISTORY OF PRESENT ILLNESS   (Location/Symptom, Timing/Onset, Context/Setting, Quality, Duration, Modifying Factors, Severity)  Note limiting factors. Sherleen Dakin is a 43 y.o. female who presents to the emergency department     44 yo F presents c/o vaginal bleeding. Started 9 months ago after giving birth. She says it stopped for about a week 2 months ago but now continues. No exacerbating or alleviating factors. She used 3 pads today. Earlier she was passing large clots. She had depo injection 2 months ago. She called her GYN today and says she was sent here. No abdominal pain  at this time but does get cramps. History of uterine fibroids. No fever, dizziness, syncope, shortness of breath or weakness. There are no other complaints or concerns at this time. The history is provided by the patient and the spouse. Nursing Notes were reviewed. REVIEW OF SYSTEMS    (2-9 systems for level 4, 10 or more for level 5)     Review of Systems   Constitutional:  Negative for chills and fever. HENT:  Negative for ear discharge and sore throat. Respiratory:  Negative for cough and shortness of breath. Cardiovascular:  Negative for chest pain and palpitations. Gastrointestinal:  Negative for abdominal pain, diarrhea and vomiting. Genitourinary:  Positive for vaginal bleeding. Negative for dysuria, frequency, urgency, vaginal discharge and vaginal pain. Musculoskeletal:  Negative for back pain and joint swelling. Skin:  Negative for rash and wound. Neurological:  Negative for syncope and headaches.    Hematological:  Negative for adenopathy. Does not bruise/bleed easily. Psychiatric/Behavioral:  Negative for dysphoric mood and sleep disturbance. Except as noted above the remainder of the review of systems was reviewed and negative. PAST MEDICAL HISTORY     Past Medical History:   Diagnosis Date    Anemia     Depression with suicidal ideation 6/14/2022    GBS carrier     Uterine fibroid          SURGICAL HISTORY     History reviewed. No pertinent surgical history.       CURRENT MEDICATIONS       Discharge Medication List as of 12/30/2022  6:34 PM        CONTINUE these medications which have NOT CHANGED    Details   vitamin B-12 500 MCG tablet Take 1 tablet by mouth daily, Disp-30 tablet, R-2Normal      traZODone (DESYREL) 50 MG tablet Take 1 tablet by mouth nightly, Disp-30 tablet, R-1Normal      medroxyPROGESTERone (DEPO-PROVERA) 150 MG/ML injection Inject 1 mL into the muscle every 3 months, Disp-1 mL, R-3Normal      ferrous sulfate (IRON 325) 325 (65 Fe) MG tablet Take 1 tablet by mouth 2 times daily, Disp-60 tablet, R-5Normal             ALLERGIES     Codeine and Lactose intolerance (gi)    FAMILY HISTORY       Family History   Problem Relation Age of Onset    Colon Cancer Maternal Aunt     Cancer Paternal Uncle         melanoma     Cancer Maternal Grandmother         uterine/ovarian          SOCIAL HISTORY       Social History     Socioeconomic History    Marital status:      Spouse name: None    Number of children: None    Years of education: None    Highest education level: None   Tobacco Use    Smoking status: Never    Smokeless tobacco: Never   Substance and Sexual Activity    Alcohol use: No    Drug use: Never       SCREENINGS         Angelika Coma Scale  Eye Opening: Spontaneous  Best Verbal Response: Oriented  Best Motor Response: Obeys commands  Angelika Coma Scale Score: 15                     CIWA Assessment  BP: (!) 143/85  Heart Rate: 75                 PHYSICAL EXAM    (up to 7 for level 4, 8 or more for level 5)     ED Triage Vitals   BP Temp Temp src Heart Rate Resp SpO2 Height Weight   12/30/22 1312 12/30/22 1312 -- 12/30/22 1312 12/30/22 1312 12/30/22 1312 12/30/22 1530 12/30/22 1530   (!) 143/85 98.7 °F (37.1 °C)  75 18 100 % 5' 9\" (1.753 m) 199 lb (90.3 kg)       Physical Exam  Vital signs and nursing notes reviewed         General:  Awake, alert, NAD. HEENT:  Normocephalic, atraumatic. EOMI. No conjunctival pallor. Neck:  Normal ROM. No meningismus. Cardiovascular:   RRR. No appreciable murmurs. Distal pulses are 2+. No cyanosis. Lungs/Chest:  No respiratory distress. There are wheezes, rales or rhonchi appreciated. No stridor. Speaking in full sentences. Equal chest rise with inspiration. No accessory muscle usage. Abdomen:  Soft, nontender, non distended. No rebound, guarding, rigidity. Pelvic Exam: nurse chaperone present for exam. Normal appearing external genitalia. Scant dark blood in vaginal vault. No clots. Cervix is normal in appearance. No bright red blood or brisk bleeding. No CMT. Back:  No CVA tenderness. No midline bony tenderness    Extremities:  Normal ROM, no edema. No calf tenderness. Skin:  Warm, dry    Neurologic:  Awake, alert, oriented to person, place, time, situation. Speech is clear. Psychiatric:  Normal interaction and behavior    DIAGNOSTIC RESULTS     EKG: none    RADIOLOGY:   none            ED BEDSIDE ULTRASOUND:   Performed by ED Physician - none    LABS:  Labs Reviewed   COMPREHENSIVE METABOLIC PANEL W/ REFLEX TO MG FOR LOW K - Abnormal; Notable for the following components:       Result Value    CO2 21 (*)     All other components within normal limits   CBC WITH AUTO DIFFERENTIAL - Abnormal; Notable for the following components:    MCHC 32.0 (*)     All other components within normal limits   PREGNANCY, URINE       All other labs were within normal range or not returned as of this dictation.     EMERGENCY DEPARTMENT COURSE and DIFFERENTIAL DIAGNOSIS/MDM:   Vitals:    Vitals:    12/30/22 1312 12/30/22 1530   BP: (!) 143/85    Pulse: 75    Resp: 18    Temp: 98.7 °F (37.1 °C)    SpO2: 100%    Weight:  199 lb (90.3 kg)   Height:  5' 9\" (1.753 m)           MDM  Pt presented with vaginal bleeding. Chronic for 9 months. No anemia. She is not pregnant. Pelvic exam without evidence of hemorrhage. On recheck she was resting comfortably. Results explained. She needs to follow up with her GYN. She is safe for discharge at this time. I do not believe she would benefit from any further emergent workup or admission. She was instructed to call her GYN and scheduled close follow up and return if If symptoms worsen especially if you are soaking a pad an hour for 6 or more hours, if you feel dizzy/lightheaded/weak or with any worsening symptoms or concerns. The patient and her  verbalized understanding and agreement with the plan. All questions answered. REASSESSMENT     ED Course as of 01/02/23 0834   Fri Dec 30, 2022   1745 Recheck at patient bedside:  Resting comfortably. Pelvic exam completed. [JS]      ED Course User Index  [JS] Diane Guo DO         CONSULTS:  None    PROCEDURES:  Unless otherwise noted below, none     Procedures        FINAL IMPRESSION      1. Vaginal bleeding          DISPOSITION/PLAN   DISPOSITION Decision To Discharge 12/30/2022 05:52:27 PM      PATIENT REFERRED TO:  Your GYN    Schedule an appointment as soon as possible for a visit in 1 week      [facility] EMERGENCY DEPT  Chidi Denis  [phone]    If symptoms worsen especially if you are soaking a pad an hour for 6 or more hours, if you feel dizzy/lightheaded/weak or with any worsening symptoms or concerns. DISCHARGE MEDICATIONS:  Discharge Medication List as of 12/30/2022  6:34 PM        Controlled Substances Monitoring:     No flowsheet data found.     (Please note that portions of this note were completed with a voice recognition program.  Efforts were made to edit the dictations but occasionally words are mis-transcribed.)    Purvi Ulrich DO (electronically signed)  Attending Emergency Physician           Purvi Ulrich DO  01/02/23 9320

## 2023-01-02 ASSESSMENT — ENCOUNTER SYMPTOMS
ABDOMINAL PAIN: 0
BACK PAIN: 0
SHORTNESS OF BREATH: 0
COUGH: 0
VOMITING: 0
SORE THROAT: 0
DIARRHEA: 0

## 2023-01-12 ENCOUNTER — NURSE ONLY (OUTPATIENT)
Dept: OBGYN CLINIC | Age: 43
End: 2023-01-12
Payer: MEDICAID

## 2023-01-12 VITALS
BODY MASS INDEX: 29.62 KG/M2 | WEIGHT: 200 LBS | SYSTOLIC BLOOD PRESSURE: 139 MMHG | HEART RATE: 61 BPM | DIASTOLIC BLOOD PRESSURE: 89 MMHG | HEIGHT: 69 IN

## 2023-01-12 DIAGNOSIS — N93.9 ABNORMAL UTERINE BLEEDING (AUB): Primary | ICD-10-CM

## 2023-01-12 PROCEDURE — 96372 THER/PROPH/DIAG INJ SC/IM: CPT | Performed by: NURSE PRACTITIONER

## 2023-01-12 RX ORDER — MEDROXYPROGESTERONE ACETATE 150 MG/ML
150 INJECTION, SUSPENSION INTRAMUSCULAR ONCE
Status: COMPLETED | OUTPATIENT
Start: 2023-01-12 | End: 2023-01-12

## 2023-01-12 RX ADMIN — MEDROXYPROGESTERONE ACETATE 150 MG: 150 INJECTION, SUSPENSION INTRAMUSCULAR at 16:22

## 2023-01-12 NOTE — PATIENT INSTRUCTIONS
Patient Education        Learning About Birth Control: The Shot  What is the shot? The shot is used to prevent pregnancy. You get the shot in your upper arm or rear end (buttocks). The shot gives you a dose of the hormone progestin. The shot is often called by its brand name, Depo-Provera. Progestin prevents pregnancy in these ways: It thickens the mucus in the cervix. This makes it hard for sperm to travel into the uterus. It also thins the lining of the uterus, which makes it harder for a fertilized egg to attach to the uterus. Progestin can sometimes stop the ovaries from releasing an egg each month (ovulation). The shot provides birth control for 3 months at a time. You then need another shot. The shot may cause bone loss. Talk to your doctor about the risks and benefits. How well does it work? In the first year of use: When the shot is used exactly as directed, fewer than 1 woman out of 100 has an unplanned pregnancy. When the shot is not used exactly as directed, 6 women out of 100 have an unplanned pregnancy. Be sure to tell your doctor about any health problems you have or medicines you take. He or she can help you choose the birth control method that is right for you. What are the advantages of the shot? The shot is one of the most effective methods of birth control. It's convenient. You need to get a shot only once every 3 months to prevent pregnancy. You don't have to interrupt sex to protect against pregnancy. It prevents pregnancy for 3 months at a time. You don't have to worry about birth control for this time. It's safe to use while breastfeeding. The shot may reduce heavy bleeding and cramping. The shot doesn't contain estrogen. So you can use it if you don't want to take estrogen or can't take estrogen because you have certain health problems or concerns. What are the disadvantages of the shot?   The shot doesn't protect against sexually transmitted infections (STIs), such as herpes or HIV/AIDS. If you aren't sure if your sex partner might have an STI, use a condom to protect against disease. The shot may cause bone loss in some women. Talk to your doctor about the risks and benefits. The shot is needed every 3 months. Any side effects may last 3 months or longer. The shot may cause irregular periods, or you may have spotting between periods. You may also stop getting a period. Some women see having no period as an advantage. It may cause mood changes, less interest in sex, or weight gain. If you want to get pregnant, it may take up to 18 months after you stop getting the shot. This is because the hormones the shot provided have to leave your system, and your body has to readjust.  Where can you learn more? Go to http://www.woods.com/ and enter L565 to learn more about \"Learning About Birth Control: The Shot. \"  Current as of: August 2, 2022               Content Version: 13.5  © 2006-2022 Healthwise, Incorporated. Care instructions adapted under license by [facility] [place] ([facility]). If you have questions about a medical condition or this instruction, always ask your healthcare professional. [address] any warranty or liability for your use of this information.

## 2023-01-12 NOTE — PROGRESS NOTES
After obtaining consent, and per orders of Kya Woodall, injection of Depo Provera given in Right upper quad. gluteus by Margarita Marcus. Patient instructed to remain in clinic for 20 minutes afterwards, and to report any adverse reaction to me immediately.

## 2023-02-23 ENCOUNTER — HOSPITAL ENCOUNTER (OUTPATIENT)
Dept: ULTRASOUND IMAGING | Age: 43
Discharge: HOME | End: 2023-02-23
Payer: MEDICAID

## 2023-02-23 DIAGNOSIS — R30.0 DYSURIA: Primary | ICD-10-CM

## 2023-02-23 DIAGNOSIS — R30.0 DYSURIA: ICD-10-CM

## 2023-02-23 DIAGNOSIS — N92.6 IRREGULAR MENSES: ICD-10-CM

## 2023-02-23 LAB
BACTERIA: NEGATIVE /HPF
BILIRUBIN URINE: NEGATIVE
BLOOD, URINE: ABNORMAL
CLARITY: CLEAR
COLOR: YELLOW
CRYSTALS, UA: ABNORMAL /HPF
EPITHELIAL CELLS, UA: 1 /HPF (ref 0–5)
GLUCOSE URINE: NEGATIVE MG/DL
HYALINE CASTS: 0 /HPF (ref 0–8)
KETONES, URINE: NEGATIVE MG/DL
LEUKOCYTE ESTERASE, URINE: NEGATIVE
NITRITE, URINE: NEGATIVE
PH UA: 6 (ref 5–8)
PROTEIN UA: NEGATIVE MG/DL
RBC UA: 3 /HPF (ref 0–4)
SPECIFIC GRAVITY UA: 1.01 (ref 1–1.03)
UROBILINOGEN, URINE: 0.2 E.U./DL
WBC UA: 0 /HPF (ref 0–5)

## 2023-02-23 PROCEDURE — 76830 TRANSVAGINAL US NON-OB: CPT

## 2023-02-23 PROCEDURE — 76830 TRANSVAGINAL US NON-OB: CPT | Performed by: RADIOLOGY

## 2023-11-14 ENCOUNTER — OFFICE VISIT (OUTPATIENT)
Dept: OBGYN CLINIC | Age: 43
End: 2023-11-14

## 2023-11-14 ENCOUNTER — TELEPHONE (OUTPATIENT)
Dept: PSYCHIATRY | Age: 43
End: 2023-11-14

## 2023-11-14 VITALS
DIASTOLIC BLOOD PRESSURE: 88 MMHG | SYSTOLIC BLOOD PRESSURE: 132 MMHG | BODY MASS INDEX: 29.68 KG/M2 | WEIGHT: 201 LBS | HEART RATE: 56 BPM

## 2023-11-14 DIAGNOSIS — D25.9 UTERINE LEIOMYOMA, UNSPECIFIED LOCATION: ICD-10-CM

## 2023-11-14 DIAGNOSIS — Z12.4 SCREENING FOR CERVICAL CANCER: ICD-10-CM

## 2023-11-14 DIAGNOSIS — F41.9 ANXIETY AND DEPRESSION: ICD-10-CM

## 2023-11-14 DIAGNOSIS — Z30.42 ENCOUNTER FOR SURVEILLANCE OF INJECTABLE CONTRACEPTIVE: ICD-10-CM

## 2023-11-14 DIAGNOSIS — Z80.0 FAMILY HISTORY OF COLON CANCER: ICD-10-CM

## 2023-11-14 DIAGNOSIS — Z01.419 ENCOUNTER FOR GYNECOLOGICAL EXAMINATION WITHOUT ABNORMAL FINDING: Primary | ICD-10-CM

## 2023-11-14 DIAGNOSIS — Z12.39 ENCOUNTER FOR SCREENING BREAST EXAMINATION: ICD-10-CM

## 2023-11-14 DIAGNOSIS — Z80.49 FAMILY HISTORY OF UTERINE CANCER: ICD-10-CM

## 2023-11-14 DIAGNOSIS — Z80.41 FAMILY HISTORY OF OVARIAN CANCER: ICD-10-CM

## 2023-11-14 DIAGNOSIS — E04.1 THYROID NODULE: ICD-10-CM

## 2023-11-14 DIAGNOSIS — Z80.0 FAMILY HISTORY OF PANCREATIC CANCER: ICD-10-CM

## 2023-11-14 DIAGNOSIS — F32.A ANXIETY AND DEPRESSION: ICD-10-CM

## 2023-11-14 DIAGNOSIS — Z12.31 ENCOUNTER FOR SCREENING MAMMOGRAM FOR MALIGNANT NEOPLASM OF BREAST: ICD-10-CM

## 2023-11-14 DIAGNOSIS — N92.6 IRREGULAR MENSES: ICD-10-CM

## 2023-11-14 ASSESSMENT — PATIENT HEALTH QUESTIONNAIRE - PHQ9
7. TROUBLE CONCENTRATING ON THINGS, SUCH AS READING THE NEWSPAPER OR WATCHING TELEVISION: SEVERAL DAYS
6. FEELING BAD ABOUT YOURSELF - OR THAT YOU ARE A FAILURE OR HAVE LET YOURSELF OR YOUR FAMILY DOWN: SEVERAL DAYS
SUM OF ALL RESPONSES TO PHQ9 QUESTIONS 1 & 2: 2
SUM OF ALL RESPONSES TO PHQ QUESTIONS 1-9: 11
2. FEELING DOWN, DEPRESSED OR HOPELESS: SEVERAL DAYS
3. TROUBLE FALLING OR STAYING ASLEEP: SEVERAL DAYS
7. TROUBLE CONCENTRATING ON THINGS, SUCH AS READING THE NEWSPAPER OR WATCHING TELEVISION: 1
3. TROUBLE FALLING OR STAYING ASLEEP: 1
10. IF YOU CHECKED OFF ANY PROBLEMS, HOW DIFFICULT HAVE THESE PROBLEMS MADE IT FOR YOU TO DO YOUR WORK, TAKE CARE OF THINGS AT HOME, OR GET ALONG WITH OTHER PEOPLE: 1
8. MOVING OR SPEAKING SO SLOWLY THAT OTHER PEOPLE COULD HAVE NOTICED. OR THE OPPOSITE - BEING SO FIDGETY OR RESTLESS THAT YOU HAVE BEEN MOVING AROUND A LOT MORE THAN USUAL: SEVERAL DAYS
SUM OF ALL RESPONSES TO PHQ QUESTIONS 1-9: 11
2. FEELING DOWN, DEPRESSED OR HOPELESS: 1
SUM OF ALL RESPONSES TO PHQ QUESTIONS 1-9: 11
1. LITTLE INTEREST OR PLEASURE IN DOING THINGS: 1
4. FEELING TIRED OR HAVING LITTLE ENERGY: 3
9. THOUGHTS THAT YOU WOULD BE BETTER OFF DEAD, OR OF HURTING YOURSELF: 1
5. POOR APPETITE OR OVEREATING: SEVERAL DAYS
5. POOR APPETITE OR OVEREATING: 1
6. FEELING BAD ABOUT YOURSELF - OR THAT YOU ARE A FAILURE OR HAVE LET YOURSELF OR YOUR FAMILY DOWN: 1
8. MOVING OR SPEAKING SO SLOWLY THAT OTHER PEOPLE COULD HAVE NOTICED. OR THE OPPOSITE, BEING SO FIGETY OR RESTLESS THAT YOU HAVE BEEN MOVING AROUND A LOT MORE THAN USUAL: 1
1. LITTLE INTEREST OR PLEASURE IN DOING THINGS: SEVERAL DAYS
SUM OF ALL RESPONSES TO PHQ QUESTIONS 1-9: 11
4. FEELING TIRED OR HAVING LITTLE ENERGY: NEARLY EVERY DAY
SUM OF ALL RESPONSES TO PHQ QUESTIONS 1-9: 10
10. IF YOU CHECKED OFF ANY PROBLEMS, HOW DIFFICULT HAVE THESE PROBLEMS MADE IT FOR YOU TO DO YOUR WORK, TAKE CARE OF THINGS AT HOME, OR GET ALONG WITH OTHER PEOPLE: SOMEWHAT DIFFICULT
9. THOUGHTS THAT YOU WOULD BE BETTER OFF DEAD, OR OF HURTING YOURSELF: SEVERAL DAYS

## 2023-11-14 ASSESSMENT — ENCOUNTER SYMPTOMS
EYES NEGATIVE: 1
GASTROINTESTINAL NEGATIVE: 1
DIARRHEA: 0
CONSTIPATION: 0
RESPIRATORY NEGATIVE: 1
ALLERGIC/IMMUNOLOGIC NEGATIVE: 1

## 2023-11-14 ASSESSMENT — COLUMBIA-SUICIDE SEVERITY RATING SCALE - C-SSRS
2. IN THE PAST MONTH, HAVE YOU ACTUALLY HAD ANY THOUGHTS OF KILLING YOURSELF?: NO
6. IN YOUR LIFETIME, HAVE YOU EVER DONE ANYTHING, STARTED TO DO ANYTHING, OR PREPARED TO DO ANYTHING TO END YOUR LIFE?: NO
1. IN THE PAST MONTH, HAVE YOU WISHED YOU WERE DEAD OR WISHED YOU COULD GO TO SLEEP AND NOT WAKE UP?: YES

## 2023-11-14 NOTE — TELEPHONE ENCOUNTER
Called and schedule an appt with pt from a referral for Specialty Services     Appt scheduled with Dr. Chandrakant Quevedo for 11/27 @ 12 PM    Electronically signed by Rosanne Castro on 11/14/2023 at 3:59 PM

## 2023-11-14 NOTE — PROGRESS NOTES
Ayden Winston is a 37 y.o. female who presents today for her medical conditions/ complaints as noted below. Ayden Winston is c/o of Annual Exam        HPI  Pt presents for well woman exam and pap smear. Also multiple chronic and new problems. Tearful regarding the recent passing of her mother from stage 4 ovarian cancer. Also reports several other family members with cancer on her mother's side. She is interested in and requesting Empower testing today. Has been diagnosed with depression and anxiety in the past. Has tried and failed Zoloft, Trazadone, and Wellbutrin. Frustrated because she feels like \"nothing works. \" Was seeing [address] [facility] [address] for counseling services and \"they kept switching my therapist.\" Has not seen psych services or therapy in over a year. Has been suicidal in the past, but pt reports not currently. Periods have been more irregular and heavy, passing clots. Was diagnosed with uterine fibroids in  and repeat scan earlier this year showing stability. Took the depo shot, but did not have any improvement in heavy bleeding or clotting. Last depo shot in January. Interested in permanent treatment with hysterectomy. Mammo:NA  Pap smear:  Contraception: none    P:2  Ab:0  Bone density:NA  Colonoscopy:NA  Patient's last menstrual period was 2023 (approximate). [id number]    Past Medical History:   Diagnosis Date    Anemia     Depression with suicidal ideation 2022    GBS carrier     Uterine fibroid      No past surgical history on file. Family History   Problem Relation Age of Onset    Ovarian Cancer Mother     Cancer Maternal Grandmother         uterine/ovarian    Pancreatic Cancer Maternal Aunt     Colon Cancer Maternal Aunt     Cancer Paternal Uncle         melanoma      Social History     Tobacco Use    Smoking status: Never    Smokeless tobacco: Never   Substance Use Topics    Alcohol use: No       No current outpatient medications on file.      No current

## 2023-11-14 NOTE — PROGRESS NOTES
Pt presents today for pap smear and breast exam.  She states can't stop thinking about she didn't get to hold her mom before she  and she had stage 4 ovarian cancer that spread to liver and stomach and  very fast. She is wanting to discuss the empower. She has fibroids and was told that they needed to come out when she was preg but then told they shrunk. She states that the depo was not helping her. She states she did not have a period for 5 months before now.       Mammo:NA  Pap smear:  Contraception:    P:2  Ab:0  Bone density:NA  Colonoscopy:NA

## 2023-11-17 ENCOUNTER — HOSPITAL ENCOUNTER (OUTPATIENT)
Dept: ULTRASOUND IMAGING | Age: 43
Discharge: HOME | End: 2023-11-17
Payer: COMMERCIAL

## 2023-11-17 DIAGNOSIS — E04.1 THYROID NODULE: Primary | ICD-10-CM

## 2023-11-17 DIAGNOSIS — E04.1 THYROID NODULE: ICD-10-CM

## 2023-11-17 PROCEDURE — 76536 US EXAM OF HEAD AND NECK: CPT

## 2023-11-18 LAB
HPV HR 12 DNA SPEC QL NAA+PROBE: NOT DETECTED
HPV16 DNA SPEC QL NAA+PROBE: NOT DETECTED
HPV16+18+H RISK 12 DNA SPEC-IMP: NORMAL
HPV18 DNA SPEC QL NAA+PROBE: NOT DETECTED

## 2023-11-23 LAB
Lab: NEGATIVE
Lab: NORMAL